# Patient Record
Sex: FEMALE | Race: WHITE | URBAN - METROPOLITAN AREA
[De-identification: names, ages, dates, MRNs, and addresses within clinical notes are randomized per-mention and may not be internally consistent; named-entity substitution may affect disease eponyms.]

---

## 2017-01-17 ENCOUNTER — CARE COORDINATION (OUTPATIENT)
Dept: CARDIOLOGY | Facility: CLINIC | Age: 82
End: 2017-01-17

## 2018-02-13 ENCOUNTER — HOSPITAL ENCOUNTER (INPATIENT)
Facility: CLINIC | Age: 83
LOS: 2 days | Discharge: HOME | DRG: 603 | End: 2018-02-16
Attending: EMERGENCY MEDICINE | Admitting: INTERNAL MEDICINE
Payer: MEDICARE

## 2018-02-13 ENCOUNTER — APPOINTMENT (OUTPATIENT)
Dept: GENERAL RADIOLOGY | Facility: CLINIC | Age: 83
DRG: 603 | End: 2018-02-13
Payer: MEDICARE

## 2018-02-13 DIAGNOSIS — L03.113 CELLULITIS OF HAND, RIGHT: ICD-10-CM

## 2018-02-13 DIAGNOSIS — M10.00 IDIOPATHIC GOUT, UNSPECIFIED CHRONICITY, UNSPECIFIED SITE: Primary | ICD-10-CM

## 2018-02-13 LAB
ALBUMIN SERPL-MCNC: 3.4 G/DL (ref 3.4–5)
ALP SERPL-CCNC: 76 U/L (ref 40–150)
ALT SERPL W P-5'-P-CCNC: 17 U/L (ref 0–50)
ANION GAP SERPL CALCULATED.3IONS-SCNC: 7 MMOL/L (ref 3–14)
AST SERPL W P-5'-P-CCNC: 13 U/L (ref 0–45)
BASOPHILS # BLD AUTO: 0 10E9/L (ref 0–0.2)
BASOPHILS NFR BLD AUTO: 0.1 %
BILIRUB SERPL-MCNC: 1 MG/DL (ref 0.2–1.3)
BUN SERPL-MCNC: 51 MG/DL (ref 7–30)
CALCIUM SERPL-MCNC: 9.3 MG/DL (ref 8.5–10.1)
CHLORIDE SERPL-SCNC: 104 MMOL/L (ref 94–109)
CO2 SERPL-SCNC: 27 MMOL/L (ref 20–32)
CREAT SERPL-MCNC: 1.51 MG/DL (ref 0.52–1.04)
CRP SERPL-MCNC: 65.9 MG/L (ref 0–8)
DIFFERENTIAL METHOD BLD: ABNORMAL
EOSINOPHIL # BLD AUTO: 0.1 10E9/L (ref 0–0.7)
EOSINOPHIL NFR BLD AUTO: 1.2 %
ERYTHROCYTE [DISTWIDTH] IN BLOOD BY AUTOMATED COUNT: 15.5 % (ref 10–15)
ERYTHROCYTE [SEDIMENTATION RATE] IN BLOOD BY WESTERGREN METHOD: 45 MM/H (ref 0–30)
GFR SERPL CREATININE-BSD FRML MDRD: 32 ML/MIN/1.7M2
GLUCOSE SERPL-MCNC: 123 MG/DL (ref 70–99)
HCT VFR BLD AUTO: 31.7 % (ref 35–47)
HGB BLD-MCNC: 9.9 G/DL (ref 11.7–15.7)
IMM GRANULOCYTES # BLD: 0 10E9/L (ref 0–0.4)
IMM GRANULOCYTES NFR BLD: 0.2 %
LYMPHOCYTES # BLD AUTO: 1.2 10E9/L (ref 0.8–5.3)
LYMPHOCYTES NFR BLD AUTO: 14 %
MCH RBC QN AUTO: 27.6 PG (ref 26.5–33)
MCHC RBC AUTO-ENTMCNC: 31.2 G/DL (ref 31.5–36.5)
MCV RBC AUTO: 88 FL (ref 78–100)
MONOCYTES # BLD AUTO: 0.4 10E9/L (ref 0–1.3)
MONOCYTES NFR BLD AUTO: 4.3 %
NEUTROPHILS # BLD AUTO: 6.6 10E9/L (ref 1.6–8.3)
NEUTROPHILS NFR BLD AUTO: 80.2 %
NRBC # BLD AUTO: 0 10*3/UL
NRBC BLD AUTO-RTO: 0 /100
PLATELET # BLD AUTO: 165 10E9/L (ref 150–450)
POTASSIUM SERPL-SCNC: 3.5 MMOL/L (ref 3.4–5.3)
PROT SERPL-MCNC: 7.2 G/DL (ref 6.8–8.8)
RBC # BLD AUTO: 3.59 10E12/L (ref 3.8–5.2)
SODIUM SERPL-SCNC: 138 MMOL/L (ref 133–144)
WBC # BLD AUTO: 8.2 10E9/L (ref 4–11)

## 2018-02-13 PROCEDURE — 80053 COMPREHEN METABOLIC PANEL: CPT | Performed by: STUDENT IN AN ORGANIZED HEALTH CARE EDUCATION/TRAINING PROGRAM

## 2018-02-13 PROCEDURE — 86140 C-REACTIVE PROTEIN: CPT | Performed by: STUDENT IN AN ORGANIZED HEALTH CARE EDUCATION/TRAINING PROGRAM

## 2018-02-13 PROCEDURE — 85610 PROTHROMBIN TIME: CPT | Performed by: STUDENT IN AN ORGANIZED HEALTH CARE EDUCATION/TRAINING PROGRAM

## 2018-02-13 PROCEDURE — 73130 X-RAY EXAM OF HAND: CPT | Mod: RT

## 2018-02-13 PROCEDURE — 99285 EMERGENCY DEPT VISIT HI MDM: CPT | Mod: 25

## 2018-02-13 PROCEDURE — 96376 TX/PRO/DX INJ SAME DRUG ADON: CPT

## 2018-02-13 PROCEDURE — 85025 COMPLETE CBC W/AUTO DIFF WBC: CPT | Performed by: STUDENT IN AN ORGANIZED HEALTH CARE EDUCATION/TRAINING PROGRAM

## 2018-02-13 PROCEDURE — 96375 TX/PRO/DX INJ NEW DRUG ADDON: CPT

## 2018-02-13 PROCEDURE — 96374 THER/PROPH/DIAG INJ IV PUSH: CPT

## 2018-02-13 PROCEDURE — 85652 RBC SED RATE AUTOMATED: CPT | Performed by: STUDENT IN AN ORGANIZED HEALTH CARE EDUCATION/TRAINING PROGRAM

## 2018-02-13 PROCEDURE — 25000128 H RX IP 250 OP 636: Performed by: STUDENT IN AN ORGANIZED HEALTH CARE EDUCATION/TRAINING PROGRAM

## 2018-02-13 RX ORDER — HYDROMORPHONE HYDROCHLORIDE 1 MG/ML
0.5 INJECTION, SOLUTION INTRAMUSCULAR; INTRAVENOUS; SUBCUTANEOUS
Status: COMPLETED | OUTPATIENT
Start: 2018-02-13 | End: 2018-02-13

## 2018-02-13 RX ADMIN — Medication 0.5 MG: at 23:50

## 2018-02-13 NOTE — IP AVS SNAPSHOT
[facility] [address] [facility]    [address]    [place] [address]    Phone:  [phone]                                       After Visit Summary   2/13/2018    Jayne Nino    MRN: [id number]           After Visit Summary Signature Page     I have received my discharge instructions, and my questions have been answered. I have discussed any challenges I see with this plan with the nurse or doctor.    ..........................................................................................................................................  Patient/Patient Representative Signature      ..........................................................................................................................................  Patient Representative Print Name and Relationship to Patient    ..................................................               ................................................  Date                                            Time    ..........................................................................................................................................  Reviewed by Signature/Title    ...................................................              ..............................................  Date                                                            Time

## 2018-02-13 NOTE — IP AVS SNAPSHOT
MRN:[id number]                      After Visit Summary   2/13/2018    Jayne Nino    MRN: [id number]           Thank you!     Thank you for choosing [facility] for your care. Our goal is always to provide you with excellent care. Hearing back from our patients is one way we can continue to improve our services. Please take a few minutes to complete the written survey that you may receive in the mail after you visit with us. Thank you!        Patient Information     Date Of Birth          6/23/1927        Designated Caregiver       Most Recent Value    Caregiver    Will someone help with your care after discharge? yes    Name of designated caregiver Talib [Son]    Phone number of caregiver [phone]    Caregiver address same as patient      About your hospital stay     You were admitted on:  February 14, 2018 You last received care in the:  [facility]    You were discharged on:  February 16, 2018        Reason for your hospital stay       You were admitted with pain and swelling of your right hand . You were given IV abx and prednisone            Reason for your hospital stay       You were admitted with swelling and pain right hand . You were given antibiotics and steroids                  Who to Call     For medical emergencies, please call 911.  For non-urgent questions about your medical care, please call your primary care provider or clinic, [phone]          Attending Provider     Provider Specialty    Carrington Kwok MD Emergency Medicine    [facility], [facility]       Primary Care Provider Office Phone # Fax #    Dinora Jimenez [phone]      After Care Instructions     Activity       As tolerated            Activity       As tolerated            Diet       Regular diet            Diet       regular                  Follow-up Appointments     Adult [facility] Follow-up and recommended labs and tests       Follow up with PCP in one week  "for post hospital discharge follow up     Appointments on [place] and/or [facility] (with [facility] or [facility] provider or service). Call [phone] if you haven't heard regarding these appointments within 7 days of discharge.            Adult [facility] Follow-up and recommended labs and tests       Follow up with PCP in one week     Appointments on [facility]/or [facility] (with [facility] or [facility] provider or service). Call [phone] if you haven't heard regarding these appointments within 7 days of discharge.                  Pending Results     No orders found from 2018 to 2018.            Statement of Approval     Ordered          18 0856  I have reviewed and agree with all the recommendations and orders detailed in this document.  EFFECTIVE NOW     Approved and electronically signed by:  Ana Cabrera MD             Admission Information     Date & Time Provider Department Dept. Phone    2018 Kishan Finn MD [facility] [phone]      Your Vitals Were     Blood Pressure Pulse Temperature Respirations Height Weight    122/61 (BP Location: Right arm) 70 95.7  F (35.4  C) (Oral) 16 1.651 m (5' 5\") 66.3 kg (146 lb 3.2 oz)    Pulse Oximetry BMI (Body Mass Index)                97% 24.33 kg/m2          MyChart Information     [facility] lets you send messages to your doctor, view your test results, renew your prescriptions, schedule appointments and more. To sign up, go to [email or web address] . Click on \"Log in\" on the left side of the screen, which will take you to the Welcome page. Then click on \"Sign up Now\" on the right side of the page.     You will be asked to enter the access code listed below, as well as some personal information. Please follow the directions to create your username and password.     Your access code is: [email or web address]  Expires: 5/15/2018  9:28 AM     Your access code will  in 90 days. If you need help or a new code, please call your [place] " clinic or [phone].        Care EveryWhere ID     This is your Care EveryWhere ID. This could be used by other organizations to access your [facility] medical records  [id number]        Equal Access to Services     ARPAN CROWLEY : Hadii aad ku hadriki Ko, waaxda luqadaha, qaybta kaalmada adeedgar, everett caballerocira wardmartell rios chava abreu. So [facility] [phone].    ATENCIÓN: Si habla español, tiene a canada disposición servicios gratuitos de asistencia lingüística. Llame al [phone].    We comply with applicable federal civil rights laws and [place] laws. We do not discriminate on the basis of race, color, national origin, age, disability, sex, sexual orientation, or gender identity.               Review of your medicines      START taking        Dose / Directions    cephALEXin 500 MG capsule   Commonly known as:  KEFLEX   Used for:  Cellulitis of hand, right        Dose:  500 mg   Take 1 capsule (500 mg) by mouth 2 times daily   Quantity:  20 capsule   Refills:  0       methylPREDNISolone 4 MG tablet   Commonly known as:  MEDROL DOSEPAK   Used for:  Idiopathic gout, unspecified chronicity, unspecified site        Follow package instructions   Quantity:  21 tablet   Refills:  0         CONTINUE these medicines which have NOT CHANGED        Dose / Directions    acetaminophen 325 MG tablet   Commonly known as:  TYLENOL        Dose:  325-650 mg   Take 325-650 mg by mouth every 4 hours as needed   Quantity:  250 tablet   Refills:  0       atorvastatin 80 MG tablet   Commonly known as:  LIPITOR        Dose:  80 mg   Take 80 mg by mouth At Bedtime   Refills:  0       cefdinir 300 MG capsule   Commonly known as:  OMNICEF   Indication:  Urinary Tract Infection   Used for:  Dysuria        Dose:  300 mg   Take 1 capsule (300 mg) by mouth 2 times daily   Quantity:  14 capsule   Refills:  0       COREG 25 MG tablet   Generic drug:  carvedilol        Dose:  25 mg   Take 25 mg by mouth 2 times daily   Refills:  0        famotidine 20 MG tablet   Commonly known as:  PEPCID        Dose:  20 mg   Take 20 mg by mouth 2 times daily.   Refills:  0       ferrous sulfate 140 (45 FE) MG Tbcr CR tablet        Dose:  140 mg   Take 140 mg by mouth daily   Refills:  0       furosemide 40 MG tablet   Commonly known as:  LASIX   Used for:  (HFpEF) heart failure with preserved ejection fraction (H)        Dose:  40 mg   Take 1 tablet (40 mg) by mouth every morning   Quantity:  30 tablet   Refills:  0       levothyroxine 125 MCG tablet   Commonly known as:  SYNTHROID/LEVOTHROID   Used for:  Bradycardia        Dose:  125 mcg   Take 1 tablet by mouth daily.   Quantity:  90 tablet   Refills:  1       polyethylene glycol 0.4%- propylene glycol 0.3% 0.4-0.3 % Soln ophthalmic solution   Commonly known as:  SYSTANE ULTRA        Dose:  1 drop   Place 1 drop into both eyes every hour as needed for dry eyes   Refills:  0       potassium chloride SA 10 MEQ CR tablet   Commonly known as:  KLOR-CON   Used for:  Hypokalemia        Dose:  10 mEq   Take 1 tablet (10 mEq) by mouth daily   Quantity:  30 tablet   Refills:  0       spironolactone 50 MG tablet   Commonly known as:  ALDACTONE   Used for:  (HFpEF) heart failure with preserved ejection fraction (H)        Dose:  50 mg   Take 1 tablet (50 mg) by mouth daily   Quantity:  30 tablet   Refills:  0       warfarin 5 MG tablet   Commonly known as:  COUMADIN   Indication:  atrial fibrillation        Dose:  5-7.5 mg   Take 5-7.5 mg by mouth daily Take 7.5 mg on Friday and 5 mg all other days of the week   Refills:  0         STOP taking     metolazone 2.5 MG tablet   Commonly known as:  ZAROXOLYN           oxyCODONE-acetaminophen 5-325 MG per tablet   Commonly known as:  PERCOCET                Where to get your medicines      These medications were sent to [facility] [place], [place] - [address]  [address] [address]     Phone:  [phone]     cephALEXin 500 MG  capsule    methylPREDNISolone 4 MG tablet                Protect others around you: Learn how to safely use, store and throw away your medicines at www.disposemymeds.org.        ANTIBIOTIC INSTRUCTION     You've Been Prescribed an Antibiotic - Now What?  Your healthcare team thinks that you or your loved one might have an infection. Some infections can be treated with antibiotics, which are powerful, life-saving drugs. Like all medications, antibiotics have side effects and should only be used when necessary. There are some important things you should know about your antibiotic treatment.      Your healthcare team may run tests before you start taking an antibiotic.    Your team may take samples (e.g., from your blood, urine or other areas) to run tests to look for bacteria. These test can be important to determine if you need an antibiotic at all and, if you do, which antibiotic will work best.      Within a few days, your healthcare team might change or even stop your antibiotic.    Your team may start you on an antibiotic while they are working to find out what is making you sick.    Your team might change your antibiotic because test results show that a different antibiotic would be better to treat your infection.    In some cases, once your team has more information, they learn that you do not need an antibiotic at all. They may find out that you don't have an infection, or that the antibiotic you're taking won't work against your infection. For example, an infection caused by a virus can't be treated with antibiotics. Staying on an antibiotic when you don't need it is more likely to be harmful than helpful.      You may experience side effects from your antibiotic.    Like all medications, antibiotics have side effects. Some of these can be serious.    Let you healthcare team know if you have any known allergies when you are admitted to the hospital.    One significant side effect of nearly all antibiotics is  the risk of severe and sometimes deadly diarrhea caused by Clostridium difficile (C. Difficile). This occurs when a person takes antibiotics because some good germs are destroyed. Antibiotic use allows C. diificile to take over, putting patients at high risk for this serious infection.    As a patient or caregiver, it is important to understand your or your loved one's antibiotic treatment. It is especially important for caregivers to speak up when patients can't speak for themselves. Here are some important questions to ask your healthcare team.    What infection is this antibiotic treating and how do you know I have that infection?    What side effects might occur from this antibiotic?    How long will I need to take this antibiotic?    Is it safe to take this antibiotic with other medications or supplements (e.g., vitamins) that I am taking?     Are there any special directions I need to know about taking this antibiotic? For example, should I take it with food?    How will I be monitored to know whether my infection is responding to the antibiotic?    What tests may help to make sure the right antibiotic is prescribed for me?      Information provided by:  www.cdc.gov/getsmart  U.S. Department of Health and Human Services  Centers for disease Control and Prevention  National Center for Emerging and Zoonotic Infectious Diseases  Division of Healthcare Quality Promotion             Medication List: This is a list of all your medications and when to take them. Check marks below indicate your daily home schedule. Keep this list as a reference.      Medications           Morning Afternoon Evening Bedtime As Needed    acetaminophen 325 MG tablet   Commonly known as:  TYLENOL   Take 325-650 mg by mouth every 4 hours as needed   Last time this was given:  650 mg on 2/15/2018 10:25 PM                                atorvastatin 80 MG tablet   Commonly known as:  LIPITOR   Take 80 mg by mouth At Bedtime   Last time this  was given:  80 mg on 2/15/2018 10:25 PM                                cefdinir 300 MG capsule   Commonly known as:  OMNICEF   Take 1 capsule (300 mg) by mouth 2 times daily                                cephALEXin 500 MG capsule   Commonly known as:  KEFLEX   Take 1 capsule (500 mg) by mouth 2 times daily   Last time this was given:  500 mg on 2/16/2018  9:07 AM                                COREG 25 MG tablet   Take 25 mg by mouth 2 times daily   Last time this was given:  25 mg on 2/16/2018  9:00 AM   Generic drug:  carvedilol                                famotidine 20 MG tablet   Commonly known as:  PEPCID   Take 20 mg by mouth 2 times daily.                                ferrous sulfate 140 (45 FE) MG Tbcr CR tablet   Take 140 mg by mouth daily                                furosemide 40 MG tablet   Commonly known as:  LASIX   Take 1 tablet (40 mg) by mouth every morning   Last time this was given:  40 mg on 2/16/2018  9:07 AM                                levothyroxine 125 MCG tablet   Commonly known as:  SYNTHROID/LEVOTHROID   Take 1 tablet by mouth daily.   Last time this was given:  125 mcg on 2/16/2018 10:06 AM                                methylPREDNISolone 4 MG tablet   Commonly known as:  MEDROL DOSEPAK   Follow package instructions                                polyethylene glycol 0.4%- propylene glycol 0.3% 0.4-0.3 % Soln ophthalmic solution   Commonly known as:  SYSTANE ULTRA   Place 1 drop into both eyes every hour as needed for dry eyes                                potassium chloride SA 10 MEQ CR tablet   Commonly known as:  KLOR-CON   Take 1 tablet (10 mEq) by mouth daily   Last time this was given:  10 mEq on 2/16/2018  9:07 AM                                spironolactone 50 MG tablet   Commonly known as:  ALDACTONE   Take 1 tablet (50 mg) by mouth daily   Last time this was given:  50 mg on 2/16/2018  9:00 AM                                warfarin 5 MG tablet   Commonly known as:   COUMADIN   Take 5-7.5 mg by mouth daily Take 7.5 mg on Friday and 5 mg all other days of the week   Last time this was given:  5 mg on 2/15/2018  6:01 PM

## 2018-02-14 ENCOUNTER — APPOINTMENT (OUTPATIENT)
Dept: ULTRASOUND IMAGING | Facility: CLINIC | Age: 83
DRG: 603 | End: 2018-02-14
Payer: MEDICARE

## 2018-02-14 PROBLEM — L03.119 CELLULITIS OF HAND: Status: ACTIVE | Noted: 2018-02-14

## 2018-02-14 LAB
ANION GAP SERPL CALCULATED.3IONS-SCNC: 7 MMOL/L (ref 3–14)
BUN SERPL-MCNC: 47 MG/DL (ref 7–30)
CALCIUM SERPL-MCNC: 9 MG/DL (ref 8.5–10.1)
CHLORIDE SERPL-SCNC: 104 MMOL/L (ref 94–109)
CO2 SERPL-SCNC: 28 MMOL/L (ref 20–32)
CREAT SERPL-MCNC: 1.37 MG/DL (ref 0.52–1.04)
CRP SERPL-MCNC: 72.4 MG/L (ref 0–8)
ERYTHROCYTE [DISTWIDTH] IN BLOOD BY AUTOMATED COUNT: 15.6 % (ref 10–15)
GFR SERPL CREATININE-BSD FRML MDRD: 36 ML/MIN/1.7M2
GLUCOSE SERPL-MCNC: 103 MG/DL (ref 70–99)
HCT VFR BLD AUTO: 30.6 % (ref 35–47)
HGB BLD-MCNC: 9.6 G/DL (ref 11.7–15.7)
INR PPP: 2.16 (ref 0.86–1.14)
INR PPP: 2.2 (ref 0.86–1.14)
MCH RBC QN AUTO: 27.7 PG (ref 26.5–33)
MCHC RBC AUTO-ENTMCNC: 31.4 G/DL (ref 31.5–36.5)
MCV RBC AUTO: 88 FL (ref 78–100)
PLATELET # BLD AUTO: 163 10E9/L (ref 150–450)
POTASSIUM SERPL-SCNC: 3.4 MMOL/L (ref 3.4–5.3)
RBC # BLD AUTO: 3.47 10E12/L (ref 3.8–5.2)
SODIUM SERPL-SCNC: 139 MMOL/L (ref 133–144)
URATE SERPL-MCNC: 10 MG/DL (ref 2.6–6)
WBC # BLD AUTO: 7.4 10E9/L (ref 4–11)

## 2018-02-14 PROCEDURE — 99233 SBSQ HOSP IP/OBS HIGH 50: CPT | Performed by: INTERNAL MEDICINE

## 2018-02-14 PROCEDURE — 25000128 H RX IP 250 OP 636: Performed by: INTERNAL MEDICINE

## 2018-02-14 PROCEDURE — 86140 C-REACTIVE PROTEIN: CPT | Performed by: INTERNAL MEDICINE

## 2018-02-14 PROCEDURE — 25000132 ZZH RX MED GY IP 250 OP 250 PS 637: Mod: GY | Performed by: INTERNAL MEDICINE

## 2018-02-14 PROCEDURE — 25000128 H RX IP 250 OP 636: Performed by: EMERGENCY MEDICINE

## 2018-02-14 PROCEDURE — 25000132 ZZH RX MED GY IP 250 OP 250 PS 637: Mod: GY | Performed by: ORTHOPAEDIC SURGERY

## 2018-02-14 PROCEDURE — 99221 1ST HOSP IP/OBS SF/LOW 40: CPT | Mod: AI | Performed by: INTERNAL MEDICINE

## 2018-02-14 PROCEDURE — A9270 NON-COVERED ITEM OR SERVICE: HCPCS | Mod: GY | Performed by: ORTHOPAEDIC SURGERY

## 2018-02-14 PROCEDURE — 84550 ASSAY OF BLOOD/URIC ACID: CPT | Performed by: INTERNAL MEDICINE

## 2018-02-14 PROCEDURE — 36415 COLL VENOUS BLD VENIPUNCTURE: CPT | Performed by: INTERNAL MEDICINE

## 2018-02-14 PROCEDURE — A9270 NON-COVERED ITEM OR SERVICE: HCPCS | Mod: GY | Performed by: INTERNAL MEDICINE

## 2018-02-14 PROCEDURE — 25000128 H RX IP 250 OP 636: Performed by: STUDENT IN AN ORGANIZED HEALTH CARE EDUCATION/TRAINING PROGRAM

## 2018-02-14 PROCEDURE — 80048 BASIC METABOLIC PNL TOTAL CA: CPT | Performed by: INTERNAL MEDICINE

## 2018-02-14 PROCEDURE — 99207 ZZC CDG-HISTORY COMP: MEETS EXP. PROBLEM FOCUSED-DOWN CODED LACK OF ROS: CPT | Performed by: INTERNAL MEDICINE

## 2018-02-14 PROCEDURE — 12000001 ZZH R&B MED SURG/OB UMMC

## 2018-02-14 PROCEDURE — 27210995 ZZH RX 272: Performed by: STUDENT IN AN ORGANIZED HEALTH CARE EDUCATION/TRAINING PROGRAM

## 2018-02-14 PROCEDURE — 76882 US LMTD JT/FCL EVL NVASC XTR: CPT | Mod: RT

## 2018-02-14 PROCEDURE — 85610 PROTHROMBIN TIME: CPT | Performed by: INTERNAL MEDICINE

## 2018-02-14 PROCEDURE — 25000125 ZZHC RX 250: Performed by: INTERNAL MEDICINE

## 2018-02-14 PROCEDURE — 85027 COMPLETE CBC AUTOMATED: CPT | Performed by: INTERNAL MEDICINE

## 2018-02-14 RX ORDER — WARFARIN SODIUM 2.5 MG/1
5 TABLET ORAL
Status: COMPLETED | OUTPATIENT
Start: 2018-02-14 | End: 2018-02-14

## 2018-02-14 RX ORDER — ACETAMINOPHEN 325 MG/1
650 TABLET ORAL EVERY 4 HOURS PRN
Status: DISCONTINUED | OUTPATIENT
Start: 2018-02-14 | End: 2018-02-16 | Stop reason: HOSPADM

## 2018-02-14 RX ORDER — OXYCODONE AND ACETAMINOPHEN 5; 325 MG/1; MG/1
1 TABLET ORAL EVERY 4 HOURS PRN
Status: DISCONTINUED | OUTPATIENT
Start: 2018-02-14 | End: 2018-02-14

## 2018-02-14 RX ORDER — SPIRONOLACTONE 25 MG/1
50 TABLET ORAL DAILY
Status: DISCONTINUED | OUTPATIENT
Start: 2018-02-14 | End: 2018-02-16 | Stop reason: HOSPADM

## 2018-02-14 RX ORDER — HYDROMORPHONE HYDROCHLORIDE 2 MG/1
2 TABLET ORAL
Status: DISCONTINUED | OUTPATIENT
Start: 2018-02-14 | End: 2018-02-14

## 2018-02-14 RX ORDER — FAMOTIDINE 20 MG/1
20 TABLET, FILM COATED ORAL 2 TIMES DAILY
Status: DISCONTINUED | OUTPATIENT
Start: 2018-02-14 | End: 2018-02-14

## 2018-02-14 RX ORDER — AMOXICILLIN 250 MG
1 CAPSULE ORAL 2 TIMES DAILY PRN
Status: DISCONTINUED | OUTPATIENT
Start: 2018-02-14 | End: 2018-02-16 | Stop reason: HOSPADM

## 2018-02-14 RX ORDER — HYDROMORPHONE HCL/0.9% NACL/PF 0.2MG/0.2
.2-.3 SYRINGE (ML) INTRAVENOUS
Status: DISCONTINUED | OUTPATIENT
Start: 2018-02-14 | End: 2018-02-14

## 2018-02-14 RX ORDER — VANCOMYCIN HYDROCHLORIDE 1 G/200ML
1000 INJECTION, SOLUTION INTRAVENOUS EVERY 24 HOURS
Status: DISCONTINUED | OUTPATIENT
Start: 2018-02-15 | End: 2018-02-14

## 2018-02-14 RX ORDER — POTASSIUM CHLORIDE 750 MG/1
10 TABLET, EXTENDED RELEASE ORAL DAILY
Status: DISCONTINUED | OUTPATIENT
Start: 2018-02-14 | End: 2018-02-15

## 2018-02-14 RX ORDER — AMOXICILLIN 250 MG
2 CAPSULE ORAL 2 TIMES DAILY PRN
Status: DISCONTINUED | OUTPATIENT
Start: 2018-02-14 | End: 2018-02-16 | Stop reason: HOSPADM

## 2018-02-14 RX ORDER — ATORVASTATIN CALCIUM 40 MG/1
80 TABLET, FILM COATED ORAL AT BEDTIME
Status: DISCONTINUED | OUTPATIENT
Start: 2018-02-14 | End: 2018-02-16 | Stop reason: HOSPADM

## 2018-02-14 RX ORDER — CALCIUM CARBONATE 500 MG/1
1000 TABLET, CHEWABLE ORAL 4 TIMES DAILY PRN
Status: DISCONTINUED | OUTPATIENT
Start: 2018-02-14 | End: 2018-02-16 | Stop reason: HOSPADM

## 2018-02-14 RX ORDER — NALOXONE HYDROCHLORIDE 0.4 MG/ML
.1-.4 INJECTION, SOLUTION INTRAMUSCULAR; INTRAVENOUS; SUBCUTANEOUS
Status: DISCONTINUED | OUTPATIENT
Start: 2018-02-14 | End: 2018-02-16 | Stop reason: HOSPADM

## 2018-02-14 RX ORDER — CARVEDILOL 25 MG/1
25 TABLET ORAL 2 TIMES DAILY
Status: DISCONTINUED | OUTPATIENT
Start: 2018-02-14 | End: 2018-02-16 | Stop reason: HOSPADM

## 2018-02-14 RX ORDER — VANCOMYCIN HYDROCHLORIDE 1 G/200ML
1000 INJECTION, SOLUTION INTRAVENOUS EVERY 24 HOURS
Status: DISCONTINUED | OUTPATIENT
Start: 2018-02-15 | End: 2018-02-15

## 2018-02-14 RX ORDER — NALOXONE HYDROCHLORIDE 0.4 MG/ML
.1-.4 INJECTION, SOLUTION INTRAMUSCULAR; INTRAVENOUS; SUBCUTANEOUS
Status: DISCONTINUED | OUTPATIENT
Start: 2018-02-14 | End: 2018-02-14

## 2018-02-14 RX ORDER — FUROSEMIDE 40 MG
40 TABLET ORAL EVERY MORNING
Status: DISCONTINUED | OUTPATIENT
Start: 2018-02-14 | End: 2018-02-15

## 2018-02-14 RX ORDER — ONDANSETRON 2 MG/ML
4 INJECTION INTRAMUSCULAR; INTRAVENOUS EVERY 6 HOURS PRN
Status: DISCONTINUED | OUTPATIENT
Start: 2018-02-14 | End: 2018-02-16 | Stop reason: HOSPADM

## 2018-02-14 RX ORDER — ONDANSETRON 4 MG/1
4 TABLET, ORALLY DISINTEGRATING ORAL EVERY 6 HOURS PRN
Status: DISCONTINUED | OUTPATIENT
Start: 2018-02-14 | End: 2018-02-16 | Stop reason: HOSPADM

## 2018-02-14 RX ORDER — VANCOMYCIN HYDROCHLORIDE 1 G/200ML
1000 INJECTION, SOLUTION INTRAVENOUS ONCE
Status: COMPLETED | OUTPATIENT
Start: 2018-02-14 | End: 2018-02-14

## 2018-02-14 RX ORDER — OXYCODONE HYDROCHLORIDE 5 MG/1
5 TABLET ORAL EVERY 4 HOURS PRN
Status: DISCONTINUED | OUTPATIENT
Start: 2018-02-14 | End: 2018-02-15

## 2018-02-14 RX ORDER — PREDNISONE 20 MG/1
40 TABLET ORAL ONCE
Status: COMPLETED | OUTPATIENT
Start: 2018-02-14 | End: 2018-02-14

## 2018-02-14 RX ADMIN — CALCIUM CARBONATE (ANTACID) CHEW TAB 500 MG 500 MG: 500 CHEW TAB at 20:20

## 2018-02-14 RX ADMIN — CEFTRIAXONE SODIUM 1 G: 10 INJECTION, POWDER, FOR SOLUTION INTRAVENOUS at 01:37

## 2018-02-14 RX ADMIN — SODIUM CHLORIDE, POTASSIUM CHLORIDE, SODIUM LACTATE AND CALCIUM CHLORIDE 250 ML: 600; 310; 30; 20 INJECTION, SOLUTION INTRAVENOUS at 19:09

## 2018-02-14 RX ADMIN — HYDROMORPHONE HYDROCHLORIDE 2 MG: 2 TABLET ORAL at 01:35

## 2018-02-14 RX ADMIN — ATORVASTATIN CALCIUM 80 MG: 40 TABLET, FILM COATED ORAL at 21:54

## 2018-02-14 RX ADMIN — FUROSEMIDE 40 MG: 40 TABLET ORAL at 08:46

## 2018-02-14 RX ADMIN — SPIRONOLACTONE 50 MG: 25 TABLET ORAL at 08:46

## 2018-02-14 RX ADMIN — WARFARIN SODIUM 5 MG: 2.5 TABLET ORAL at 18:20

## 2018-02-14 RX ADMIN — ATORVASTATIN CALCIUM 80 MG: 40 TABLET, FILM COATED ORAL at 03:29

## 2018-02-14 RX ADMIN — POTASSIUM CHLORIDE 10 MEQ: 750 TABLET, FILM COATED, EXTENDED RELEASE ORAL at 08:46

## 2018-02-14 RX ADMIN — LEVOTHYROXINE SODIUM 125 MCG: 25 TABLET ORAL at 08:46

## 2018-02-14 RX ADMIN — PREDNISONE 40 MG: 20 TABLET ORAL at 08:46

## 2018-02-14 RX ADMIN — RANITIDINE 150 MG: 150 TABLET ORAL at 08:46

## 2018-02-14 RX ADMIN — CARVEDILOL 25 MG: 25 TABLET, FILM COATED ORAL at 08:46

## 2018-02-14 RX ADMIN — VANCOMYCIN HYDROCHLORIDE 1000 MG: 1 INJECTION, SOLUTION INTRAVENOUS at 01:43

## 2018-02-14 ASSESSMENT — ENCOUNTER SYMPTOMS
SORE THROAT: 0
PALPITATIONS: 0
FEVER: 0
AGITATION: 0
EYE PAIN: 0
ARTHRALGIAS: 1
DIARRHEA: 0
CHILLS: 0
JOINT SWELLING: 1
NECK PAIN: 0
CHEST TIGHTNESS: 0
HEADACHES: 0
WOUND: 0
VOMITING: 0
NAUSEA: 0
COLOR CHANGE: 1
ABDOMINAL PAIN: 0
SHORTNESS OF BREATH: 0
ADENOPATHY: 0
CONSTIPATION: 0
RHINORRHEA: 0
DYSURIA: 0
FLANK PAIN: 0
NERVOUS/ANXIOUS: 0
DIZZINESS: 0
CONFUSION: 0
POLYDIPSIA: 0
VOICE CHANGE: 0
WHEEZING: 0
LIGHT-HEADEDNESS: 0
WEAKNESS: 0

## 2018-02-14 ASSESSMENT — ACTIVITIES OF DAILY LIVING (ADL)
RETIRED_COMMUNICATION: 0-->UNDERSTANDS/COMMUNICATES WITHOUT DIFFICULTY
AMBULATION: 1-->ASSISTIVE EQUIPMENT
COGNITION: 0 - NO COGNITION ISSUES REPORTED
SWALLOWING: 0-->SWALLOWS FOODS/LIQUIDS WITHOUT DIFFICULTY
ADLS_ACUITY_SCORE: 18
FALL_HISTORY_WITHIN_LAST_SIX_MONTHS: NO
RETIRED_EATING: 0-->INDEPENDENT
TOILETING: 1-->ASSISTIVE EQUIPMENT
TRANSFERRING: 0-->INDEPENDENT
DRESS: 0-->INDEPENDENT
ADLS_ACUITY_SCORE: 18
ADLS_ACUITY_SCORE: 19
BATHING: 1-->ASSISTIVE EQUIPMENT
ADLS_ACUITY_SCORE: 18
ADLS_ACUITY_SCORE: 18

## 2018-02-14 NOTE — PROGRESS NOTES
Seen again this PM.    Decreased erythema across dorsal hand. Mildly improved pain.  Tenderness in affected area remains.  ROM similar to exam this AM.    Still with no systemic symptoms.          A:  Improvement on prednisone suggests pseudogout could indeed be the diagnosis.  Alternative thought would be suppression of the immune response to infection causing improvement in erythema; this remains possible as patient remains with pain in similar areas at present, though we're still early in prednisone treatment timeline.    P:  Will continue to follow clinically.    If develops systemic symptoms of infection, would recommend immediately restarting antibiotics.    Hiren France MD  Orthopaedic Surgery PGY-4  Pager:  [phone]

## 2018-02-14 NOTE — H&P
History and Physical     Jayne Nino MRN# [id number]   YOB: 1927 Age: 89 year old      Date of Admission:  2/13/2018      CC: R hand pain, swelling.   ~4 days.     HPI: Obtained from the patient, chart review, ED provider.    Ms Nino,  is a Right hand dominat 89 year old female with a significant past medical history of aortic valve replacement, afib on coumadin, htn, HfpEF, ? Acute gout  who presents with 4 days dorsal R hand painful swelling, redness and chills.      Insidious onset, no antecedent skin injuries. Started in index + long PIPs. Since then the erythema, pain, swelling has spread across those two digits and over dorsal hand. Pain worsened as swelling spread.      Symptom Profile  Quality of symptoms:   Achy/sharp  Severity:   Moderate to severe  Exacerbating:    Movement, palpation of erythematous areas  Alleviate:   rest    No fever. No CP or sob or cough. No bowel or bladder concern. No rash elsewhere.   At baseline ambulates using walker. LUNDBERG+. Not on oxygen at home.   No other concern.     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   Dry eyes  PVD  ARMD  HL  hypothyroidism  Obesity  OA  arthritis    Past Surgical History:  Past Surgical History:   Procedure Laterality Date     ORTHOPEDIC SURGERY- hip replacement.        REPLACE VALVE AORTIC         Allergies:     Allergies   Allergen Reactions     Oxycodone      Pt states she takes it for back pain, only causes itching       Medications:    No current facility-administered medications on file prior to encounter.   Current Outpatient Prescriptions on File Prior to Encounter:  furosemide (LASIX) 40 MG tablet Take 1 tablet (40 mg) by mouth every morning   spironolactone (ALDACTONE) 50 MG tablet Take 1 tablet (50 mg) by mouth daily   potassium chloride SA (POTASSIUM CHLORIDE) 10 MEQ tablet Take 1 tablet (10  mEq) by mouth daily   cefdinir (OMNICEF) 300 MG capsule Take 1 capsule (300 mg) by mouth 2 times daily   oxyCODONE-acetaminophen (PERCOCET) 5-325 MG per tablet Take 1 tablet by mouth every 6 hours as needed   warfarin (COUMADIN) 5 MG tablet Take 5-7.5 mg by mouth daily Take 7.5 mg on Friday and 5 mg all other days of the week   carvedilol (COREG) 25 MG tablet Take 25 mg by mouth 2 times daily   ferrous sulfate 140 (45 FE) MG TBCR Take 140 mg by mouth daily   metolazone (ZAROXOLYN) 2.5 MG tablet Take 2.5 mg by mouth three times a week On Mondays, Wednesdays and Fridays   atorvastatin (LIPITOR) 80 MG tablet Take 80 mg by mouth At Bedtime   polyethylene glycol 0.4%- propylene glycol 0.3% (SYSTANE ULTRA) 0.4-0.3 % SOLN ophthalmic solution Place 1 drop into both eyes every hour as needed for dry eyes   famotidine (PEPCID) 20 MG tablet Take 20 mg by mouth 2 times daily.   acetaminophen (TYLENOL) 325 MG tablet Take 325-650 mg by mouth every 4 hours as needed    levothyroxine (SYNTHROID, LEVOTHROID) 125 MCG tablet Take 1 tablet by mouth daily.       Social History:  Social History     Social History     Marital status:      Spouse name: N/A     Number of children: N/A     Years of education: N/A     Occupational History     Not on file.     Social History Main Topics     Smoking status: Never Smoker     Smokeless tobacco: Never Used     Alcohol use No     Drug use: No     Sexual activity: Not on file     Other Topics Concern     Not on file     Social History Narrative       Family History:   Family History   Problem Relation Age of Onset     Coronary Artery Disease Mother      Coronary Artery Disease Father      CEREBROVASCULAR DISEASE Mother      Breast Cancer No family hx of      Colon Cancer No family hx of      Other Cancer No family hx of          ROS:  The remainder of the complete ROS was negative unless noted in the HPI.      Exam:    /66  Pulse 95  Temp 96.3  F (35.7  C) (Oral)  Resp 16  Ht 1.651  "m (5' 5\")  Wt 66.3 kg (146 lb 3.2 oz)  SpO2 95%  Breastfeeding? No  BMI 24.33 kg/m2    Temp (24hrs), Av.4  F (36.3  C), Min:96.3  F (35.7  C), Max:98.9  F (37.2  C)      Wt Readings from Last 5 Encounters:   18 66.3 kg (146 lb 3.2 oz)   16 78.3 kg (172 lb 9.6 oz)   10/30/12 90.7 kg (200 lb)   11 87.5 kg (193 lb)   11 87.5 kg (193 lb)       General: Alert, interactive, NAD  HEENT: AT/NC, sclera anicteric, PERRL, moist MM  Neck: Supple, no  lymphadenopathy  Resp/chest: non labored. Fine basal crackles bl. no  wheezes  Cardiac/Heart: s1s2 irregular   GI/Abdomen: Soft, nontender, nondistended. +BS.  No rebound or guarding.  MSK/Extremities:bl 1+ LE edema, distally warm and well perfused.   R hand: diffuse tender swelling and erythema over dorsal hand extending over almost all digits. No skin breakdown or discharge. Rest per Ortho  Skin: Warm and dry, no jaundice   Neuro: Alert & oriented x 3, Cns 2-12 intact,   Psychiatry: stable mood.       Labs:    BMP  Recent Labs  Lab 18      POTASSIUM 3.5   CHLORIDE 104   NAVARRO 9.3   CO2 27   BUN 51*   CR 1.51*   *     CBC  Recent Labs  Lab 18   WBC 8.2   RBC 3.59*   HGB 9.9*   HCT 31.7*   MCV 88   MCH 27.6   MCHC 31.2*   RDW 15.5*        INR  Recent Labs  Lab 18   INR 2.20*     LFTs  Recent Labs  Lab 18   ALKPHOS 76   AST 13   ALT 17   BILITOTAL 1.0   PROTTOTAL 7.2   ALBUMIN 3.4      PANCNo lab results found in last 7 days.    Imaging:   Recent Results (from the past 24 hour(s))   Hand XR, G/E 3 views, right    Narrative    HAND RIGHT THREE OR MORE VIEWS   2018 10:33 PM     HISTORY: Rule out osteomyelitis.    COMPARISON: None.      Impression    IMPRESSION: No convincing destructive bony lesions. However, if  further assessment is desired, MRI could provide more sensitive  assessment. No fractures identified. Scattered degenerative joint  disease changes.    KELLY DONIS MD   US " Extremity Non Vascular Right    Narrative    ULTRASOUND SOFT TISSUES RIGHT INDEX FINGER LIMITED  2/14/2018 12:50 AM      HISTORY: Concern for abscess in the dorsal aspect of the right hand.          IMPRESSION: Edema is present within the soft tissues of the dorsal  aspect of the right hand at the base of the second finger, at the site  of clinical concern. No underlying abscess is visualized.    MICHI CRANDALL MD       ESR  45  CRP  66        Assessment/ Plan:    89 yo pleasant female with multiple medical co morbidities admitted with R hand progressive erythema, painful swelling x 4 days.     # R hand erythema, pain, swelling:   ? Cellulitis vs inflammatory arthritis vs septic arthritis.   Esr, crp elevated.   Chills+ otherwise no other systemic s/s  Seen by Ortho in ED, ?long finger PIP septic arthritis.    XR, US as above. No drainable abscess.     Ortho: rec iv abx, ortho to follow and assess for ? PIP  Started empirically on iv Vancomycin and Ceftriaxone in eD, continue.   Keep r hand elevated.   Monitor vitals. Trend crp    Pain control: tylenol. Narcotics po/iv prn- use judiciously  Check Uric acid    FU with ORtho  Consider ID consult based on clinical response.     # Chronic medical problems:     CVS:   AvR  HTN, HL  Afib on Coumadin   HFpEF    - appears stable.   - Resume PTA medications: including Coreg, Coumadin, furosemide, spironolactone, atorvastatin.     # Hypothyroidism:   - Levothyroxine.     # dry eyes: eye drops.       FEN: regular diet.   Prophylaxis: on couamdin. Famotidine.   IV Access; PIV  CODE: full    Disposition: Disposition Plan   Expected discharge in 2-3 days to prior living arrangement once medically ready.     Entered: Kishan Finn 02/14/2018, 4:17 AM       * verify PTA medications in am with patient/family.    D/w RN     Kishan Finn MD   Hospitalist (Internal Medicine)  [facility]  Pager: [phone].

## 2018-02-14 NOTE — PROGRESS NOTES
Pt. transferred from ER at 0233. VS stable on room air; orientated patient to room and call light system. Patient belongings placed into bedside closet. Continue to monitor.

## 2018-02-14 NOTE — PLAN OF CARE
Problem: Patient Care Overview  Goal: Plan of Care/Patient Progress Review  Pt A/O X 4. VSS, put patient on 2L NC to maintain SpO2. Patient reports disrupted sleep, wasn't able to go to sleep until after 0330 due to transfer and admission to unit. Lung sounds are clear/equal bilaterally. IS encouraged.  PP2+ DP2+. CMS and Neuro's are intact. Denies numbness and tingling in all extremities. Denies nausea, shortness of breath, and chest pain. Has pain in the right hand and denied a need for pain medication, RUE elevated on pill, and is tolerating well. Right hand cellulitis, all other areas of skin are intact. Voids spontaneously without difficulty in the bathroom. Is on a regular diet, no meal this shift. Bowel sounds are audible/active in all four quadrants, is gas, last BM 2/12. Pt up assist of 1 with walker. PIV is patent in the right forearm and it is saline locked between antibiotics. PCD's on BLE's. Bed alarm on for safety. Pt is able to make needs known and the call light is within the pt's reach. Continue to monitor.

## 2018-02-14 NOTE — ED PROVIDER NOTES
"  History     Chief Complaint   Patient presents with     Hand Pain     Pt has swelling and redness to (R) hand. Sent here from urgent care. No known injury     The history is provided by the patient.     Jayne Nino is a 89 year old female who is sent from urgent care for emergency room evaluation concerning right hand swelling and redness.  Patient states that this began about 5 days ago.  She first noticed swelling in the distal right hand, second and third digits.  Swelling has progressively moved to the proximal dorsum of hand.  States her hand is tender.  Patient does have history of gout, diagnosed December 2017, but does not have serology.  She does admit \"bad\" diet over the past few weeks including snacks, and meats.    Patient does have a history of chronic diastolic heart failure, chronic A. fib, hypertension, aortic valve replacement 2011, hypothyroidism, hyperlipidemia.    I have reviewed the Medications, Allergies, Past Medical and Surgical History, and Social History in the Epic system.    Review of Systems   Constitutional: Negative for chills and fever.   HENT: Negative for congestion, rhinorrhea, sore throat and voice change.    Eyes: Negative for pain and visual disturbance.   Respiratory: Negative for chest tightness, shortness of breath and wheezing.    Cardiovascular: Negative for chest pain, palpitations and leg swelling.   Gastrointestinal: Negative for abdominal pain, constipation, diarrhea, nausea and vomiting.   Endocrine: Negative for polydipsia and polyuria.   Genitourinary: Negative for dysuria, flank pain, pelvic pain and vaginal bleeding.   Musculoskeletal: Positive for arthralgias and joint swelling. Negative for neck pain.   Skin: Positive for color change (R hand). Negative for wound.   Allergic/Immunologic: Negative for immunocompromised state.   Neurological: Negative for dizziness, syncope, weakness, light-headedness and headaches.   Hematological: Negative for " "adenopathy.   Psychiatric/Behavioral: Negative for agitation and confusion. The patient is not nervous/anxious.        Physical Exam   BP: 153/69  Heart Rate: 102  Temp: 97  F (36.1  C)  Resp: 18  Height: 165.1 cm (5' 5\")  Weight: 66.7 kg (147 lb)  SpO2: 98 %      Physical Exam   Constitutional: She is oriented to person, place, and time. She appears well-developed. No distress.   HENT:   Head: Normocephalic and atraumatic.   Mouth/Throat: Oropharynx is clear and moist.   Eyes: Conjunctivae and EOM are normal. Pupils are equal, round, and reactive to light. No scleral icterus.   Neck: Neck supple. No tracheal deviation present.   Cardiovascular: Normal rate, regular rhythm and intact distal pulses.    Pulmonary/Chest: Effort normal. No respiratory distress. She has no wheezes.   Abdominal: Soft. She exhibits no distension. There is no tenderness.   Musculoskeletal: She exhibits edema and tenderness.        Right hand: She exhibits decreased range of motion, tenderness, deformity (gross swelling ) and swelling. She exhibits normal capillary refill.        Hands:  Neurological: She is alert and oriented to person, place, and time.   Skin: Skin is warm. She is not diaphoretic. There is erythema (per picture ).   Psychiatric: She has a normal mood and affect. Her behavior is normal.       ED Course     ED Course   (2120) Pt was seen and evaluated in the emergency department.  She is afebrile and vitally stable.  Ordered CBC, CMP, CRP, ESR, and right hand x-ray.  (2230) CBC did not show leukocytosis, but did show anemia, hemoglobin 9.9.  CMP showing creatinine 1.51, but does have elevated ranges in past.  Sed rate elevated 45, CRP elevated 65.9.  X-ray not showing any acute changes.  INR 2.2, therapeutic.  (2250) reached out to orthopedics.  Patient given 0.5 Dilaudid.  (6055) discussed case with orthopedics via phone.   (0000) orthopedics resident assessed patient.  Not believing this to be osteomyelitis at this time, " but concern for cellulitis, with or without abscess. requesting ultrasound to evaluate for abscess.  US ordered.  Pain did improve with medication.  (0115) ultrasound not showing abscess per ED read. Do see cobblestoning indicative of cellulitis.  Ordered vancomycin and rocephin.   (0135) ultrasound reviewed showing edema with no underlying abscess.  Admission to medicine order placed.  (0205) discussed with medicine physician, service will accept.  She will be admitted.      Procedures          Critical Care time:  none       Labs Ordered and Resulted from Time of ED Arrival Up to the Time of Departure from the ED   CBC WITH PLATELETS DIFFERENTIAL - Abnormal; Notable for the following:        Result Value    RBC Count 3.59 (*)     Hemoglobin 9.9 (*)     Hematocrit 31.7 (*)     MCHC 31.2 (*)     RDW 15.5 (*)     All other components within normal limits   COMPREHENSIVE METABOLIC PANEL - Abnormal; Notable for the following:     Glucose 123 (*)     Urea Nitrogen 51 (*)     Creatinine 1.51 (*)     GFR Estimate 32 (*)     GFR Estimate If Black 39 (*)     All other components within normal limits   ERYTHROCYTE SEDIMENTATION RATE AUTO - Abnormal; Notable for the following:     Sed Rate 45 (*)     All other components within normal limits   CRP INFLAMMATION - Abnormal; Notable for the following:     CRP Inflammation 65.9 (*)     All other components within normal limits   INR - Abnormal; Notable for the following:     INR 2.20 (*)     All other components within normal limits          Assessments & Plan (with Medical Decision Making)   89-year-old female presents from urgent care with right swollen hand and erythema.    Differential diagnosis includes but not limited to cellulitis, osteomyelitis, flexor tenosynovitis, septic arthritis.    On Exam, patient does have second and third digits of right hand rather swollen.  More dorsal than palmar involvement of pain and erythema.  Patient does have elevated CRP and ESR,  however does not have fever or leukocytosis.  Reached out to orthopedics for evaluation in emergency department.  Orthopedics resident evaluated and assessed.  Believing skin more in line with cellulitic nature.  Ultrasound ordered to assess for abscess.  With dorsal aspect more involved the palmar aspect, less concern for flexor tenosynovitis.  Septic arthritis not ruled out at this time, however patient does have history of degenerative changes, thus no emergent intervention and OR at this time.  Patient was started on vancomycin and Rocephin IV.  Ultrasound not showing indications of abscess.    Patient will be admitted to medicine.  Orthopedics will continue to follow while inpatient.    I have reviewed the nursing notes.    I have reviewed the findings, diagnosis, plan and need for follow up with the patient.    New Prescriptions    No medications on file       Final diagnoses:   Cellulitis of hand, right       2/13/2018   Aakash Contreras DO, MA  Family Medicine PGY-1  [facility], [place], EMERGENCY DEPARTMENT

## 2018-02-14 NOTE — PHARMACY-VANCOMYCIN DOSING SERVICE
Pharmacy Vancomycin Initial Note  Date of Service 2018  Patient's  1927  89 year old, female    Indication: Skin and Soft Tissue Infection    Current estimated CrCl = Estimated Creatinine Clearance: 25.9 mL/min (based on Cr of 1.51).    Creatinine for last 3 days  2018:  9:59 PM Creatinine 1.51 mg/dL    Recent Vancomycin Level(s) for last 3 days  No results found for requested labs within last 72 hours.      Vancomycin IV Administrations (past 72 hours)                   vancomycin (VANCOCIN) 1000 mg in dextrose 5% 200 mL PREMIX (mg) 1,000 mg New Bag 18 0143                Nephrotoxins and other renal medications (Future)    Start     Dose/Rate Route Frequency Ordered Stop    18 0800  furosemide (LASIX) tablet 40 mg      40 mg Oral EVERY MORNING 18 0255            Contrast Orders - past 72 hours     None                Plan:  1.  Start vancomycin  1000 mg IV q24h.   2.  Goal Trough Level: 10-15 mg/L   3.  Pharmacy will check trough levels as appropriate in 1-3 Days.  4. Serum creatinine levels will be ordered daily for the first week of therapy and at least twice weekly for subsequent weeks.    5. Bancroft method utilized to dose vancomycin therapy: Method 2    Clover Martinez

## 2018-02-14 NOTE — PROGRESS NOTES
"Ortho Progress Note     Subjective:  Pain stable overnight, maybe slightly better since 0600 this AM.  No fevers/chills.    Objective:  /48 (BP Location: Right arm)  Pulse 82  Temp 99.3  F (37.4  C) (Oral)  Resp 16  Ht 1.651 m (5' 5\")  Wt 66.3 kg (146 lb 3.2 oz)  SpO2 98%  Breastfeeding? No  BMI 24.33 kg/m2  Gen: A&Ox3, no acute distress  CV: 2+ dp/pt pulses, capillary refill < 2sec  Resp: breathing equal and non-labored, no wheezing  MSK:     RUE       Stable erythema of dorsal index, long fingers and dorsal distal hand, has not moved past margins demarcated overnight   Minimally flexes/extends digits secondary to pain        SILT R/U/M       Motor:  Fires EPL, FPL, abductors       Perfusion:  Warm and well perfused in distal digits, palpable radial pulse     Hemoglobin   Date Value Ref Range Status   02/14/2018 9.6 (L) 11.7 - 15.7 g/dL Final   02/13/2018 9.9 (L) 11.7 - 15.7 g/dL Final     CRP 72  (66 last night)      Assessment/Plan:  89F with concern for cellulitis.      Discussed with internal medicine team this AM, they favor diagnosis of pseudogout.  This would be consistent with the onset (two adjacent PIP joints becoming painful at same time), however would not expect this degree of erythema / tissue cobblestoning of dorsal distal hand from a pseudogout flare.      Medicine team planning to stop Abx and start prednisone, with close clinical monitoring over next several hours to evaluate for change in symptoms.  If improves on prednisone, favor CPPD diagnosis.  If no improvement or worsens, they plan to re-start antibiotics for infection diagnosis.    Orthopedics will continue to follow.  Would not plan for surgical management unless diagnosis of infection confirmed, and abscess develops (ultrasound evaluation to determine) or PIP joints do not improve despite improving surrounding erythema on antibiotic therapy.      Hiren France MD  Orthopaedic Surgery PGY-4  Pager:  [phone]    "

## 2018-02-14 NOTE — PLAN OF CARE
Problem: Skin and Soft Tissue Infection (Adult)  Goal: Signs and Symptoms of Listed Potential Problems Will be Absent, Minimized or Managed (Skin and Soft Tissue Infection)  Signs and symptoms of listed potential problems will be absent, minimized or managed by discharge/transition of care (reference Skin and Soft Tissue Infection (Adult) CPG).   Outcome: No Change    VS: Stable   O2: 98% on RA    Output: Voiding adequate amts in the bathroom    Last BM: 2/12/18   Activity: Ambulating with SBA and FWW    Skin: Redness at right hand and wrist. Redness decreasing after prednisone started.     Pain: Pain tolerable and pt declines any interventions other than limb  elevation.    CMS: Intact    Dressing: None    Diet: Tolerating regular diet with fair appetite.    LDA: PIV SL    Equipment: FWW with ambulation .    Plan: Determine pseudogout vs. Infection. Continue to monitor inflammatory response with prednisone use and adjust therapies accordingly.    Additional Info: Mild confusion possibly related to age, poor sleep or opoid use in ED. Bed alarm activated and pt used call light appropriately.

## 2018-02-14 NOTE — PHARMACY-ANTICOAGULATION SERVICE
Clinical Pharmacy - Warfarin Dosing Consult     Pharmacy has been consulted to manage this patient s warfarin therapy.  Indication: Atrial Fibrillation  Therapy Goal: INR 2-3  Warfarin Prior to Admission: Yes  Warfarin PTA Regimen: unknown at this time.  7.5 mg Fridays and 5 mg ROW   Significant drug interactions: tylenol   melatonin   spironolactone   levothyroxine  Recent documented change in oral intake/nutrition: Unknown    INR   Date Value Ref Range Status   02/13/2018 2.20 (H) 0.86 - 1.14 Final   11/07/2016 2.38 (H) 0.86 - 1.14 Final       Patient arrived on floor ~0330 2-14.  Was able to state she did not take warfarin on 2-13 but did not state dose she was on. She refused warfarin at this time stating she just wanted to go to sleep.  The INR was drawn 2-13 ~2200 and  still in goal range. Will leave note for pharmacy to check her am INR level and give her dose early to insure patient remains in target range.  Pharmacy will monitor Jayne Wardbradysunil daily and order warfarin doses to achieve specified goal.      Please contact pharmacy as soon as possible if the warfarin needs to be held for a procedure or if the warfarin goals change.

## 2018-02-14 NOTE — PROGRESS NOTES
"Patient was admitted last night with pain and swelling right hand for last 4 days  No fever or chills  Has had this once in the past , was told it was pseudogout and improved with steroids  Vital signs:  Temp: 99.3  F (37.4  C) Temp src: Oral BP: 123/48 Pulse: 82 Heart Rate: 82 Resp: 16 SpO2: 98 % O2 Device: Nasal cannula Oxygen Delivery: 1.5 LPM Height: 165.1 cm (5' 5\") Weight: 66.3 kg (146 lb 3.2 oz)  Estimated body mass index is 24.33 kg/(m^2) as calculated from the following:    Height as of this encounter: 1.651 m (5' 5\").    Weight as of this encounter: 66.3 kg (146 lb 3.2 oz).   Alert and oriented   Neck ; supple  Chest ; clear bl  cvs ; s1 ands 2 , irregular   Gi ; soft abdomen, bs positive  Ext ; right hand dorsum and joints PIP swollen and tender to touch   Labs ; uric acid 10 ,   Wbc 7.4  crp 72 from 65  Creatinine 1.37 ( 1.57)  A/P  Right hand swelling , pain ; normal wbc, no fevers.  Clinically seems pseudogout   Will give one time prednisone ( no cochicine/NSAIDs due to kidney function ) and monitor  Dc abx for now ( got vanco and ceftriaxone )  Monitor clinically   Elevate hand  Raudel ortho input     Chronic a fib and avr ; on coumadin     HFpEF ; ct lasix, spironolactone, coreg, atorvastatin   '  Hx delirium ; dc IV dilaudid , low dose oxycodone and tylenol     DVT prop ; on warfarin  GI ; zantac  Addendum ; saw patient again at 1 pm . Hand looks better and she felt better. Slept and rested    Spoke with ortho attending   Will resume Betty abx  "

## 2018-02-14 NOTE — ED NOTES
[facility], [place]   ED Nurse to Floor Handoff     Jayne Nino is a 89 year old female who speaks English and lives ,  in a home  They arrived in the ED by car from emergency room    ED Chief Complaint: Hand Pain (Pt has swelling and redness to (R) hand. Sent here from urgent care. No known injury)    ED Dx;   Final diagnoses:   Cellulitis of hand, right         Needed?: No    Allergies:   Allergies   Allergen Reactions     Oxycodone      Pt states she takes it for back pain, only causes itching   .  Past Medical Hx:   Past Medical History:   Diagnosis Date     Heart disease       Baseline Mental status: WDL  Current Mental Status changes: at basesline    Infection: Yes  Sepsis suspected: No  Isolation type: No active isolations     Activity level - Baseline/Home:  Independent  Activity Level - Current:   Independent    Bariatric equipment needed?: No    In the ED these meds were given:   Medications   HYDROmorphone (DILAUDID) tablet 2 mg (2 mg Oral Given 2/14/18 0135)   vancomycin (VANCOCIN) 1000 mg in dextrose 5% 200 mL PREMIX (1,000 mg Intravenous New Bag 2/14/18 0143)   HYDROmorphone (PF) (DILAUDID) injection 0.5 mg (0.5 mg Intravenous Given 2/13/18 2350)   cefTRIAXone (ROCEPHIN) 1 g in 10 mL SWFI Premix Syringe (1 g Intravenous Given 2/14/18 0137)       Drips running?  No    Home pump or pre-existing LDA's present? Yes    Labs results:   Labs Ordered and Resulted from Time of ED Arrival Up to the Time of Departure from the ED   CBC WITH PLATELETS DIFFERENTIAL - Abnormal; Notable for the following:        Result Value    RBC Count 3.59 (*)     Hemoglobin 9.9 (*)     Hematocrit 31.7 (*)     MCHC 31.2 (*)     RDW 15.5 (*)     All other components within normal limits   COMPREHENSIVE METABOLIC PANEL - Abnormal; Notable for the following:     Glucose 123 (*)     Urea Nitrogen 51 (*)     Creatinine 1.51 (*)     GFR Estimate 32 (*)     GFR Estimate If Black 39 (*)      "All other components within normal limits   ERYTHROCYTE SEDIMENTATION RATE AUTO - Abnormal; Notable for the following:     Sed Rate 45 (*)     All other components within normal limits   CRP INFLAMMATION - Abnormal; Notable for the following:     CRP Inflammation 65.9 (*)     All other components within normal limits   INR - Abnormal; Notable for the following:     INR 2.20 (*)     All other components within normal limits       Imaging Studies:   Recent Results (from the past 24 hour(s))   Hand XR, G/E 3 views, right    Narrative    HAND RIGHT THREE OR MORE VIEWS   2/13/2018 10:33 PM     HISTORY: Rule out osteomyelitis.    COMPARISON: None.      Impression    IMPRESSION: No convincing destructive bony lesions. However, if  further assessment is desired, MRI could provide more sensitive  assessment. No fractures identified. Scattered degenerative joint  disease changes.    KELLY DONIS MD   US Extremity Non Vascular Right    Narrative    ULTRASOUND SOFT TISSUES RIGHT INDEX FINGER LIMITED  2/14/2018 12:50 AM      HISTORY: Concern for abscess in the dorsal aspect of the right hand.    COMPARISON: None.    TECHNIQUE: Focused ultrasound scanning was performed by the ultrasound  technologist of the soft tissues of the dorsal aspect of the base of  the right second finger, at the site of clinical concern.      Impression    IMPRESSION: Edema is present within the soft tissues of the dorsal  aspect of the right hand at the base of the second finger, at the site  of clinical concern. No underlying abscess is visualized.       Recent vital signs:   /69  Temp 97  F (36.1  C) (Oral)  Resp 18  Ht 1.651 m (5' 5\")  Wt 66.7 kg (147 lb)  SpO2 98%  Breastfeeding? No  BMI 24.46 kg/m2    Cardiac Rhythm: no    Pt needs tele? No  Skin/wound Issues: Pt has infection cellulitis erythema to rt hand    Code Status: Full Code    Pain control: good    Nausea control: good    Abnormal labs/tests/findings requiring intervention: CBC " WBC     Family present during ED course? Yes   Family Comments/Social Situation comments:  went home for the evening and does not know l;ocation please contact in the am.    Tasks needing completion: continue abx, evaluate pain. MD assessment.    Petey Tilley RN  [facility]-- [phone] [place] ED  [phone] [facility] ED

## 2018-02-14 NOTE — CONSULTS
Orthopedic Surgery Consult / History and Physical    Jayne Nino MRN# [id number]   Age: 89 year old YOB: 1927     Date of Admission:  2/13/2018    Primary care provider: Dinora Jimenez           Assessment and Plan:   Assessment:  R dorsal hand cellulitis, possible long finger PIP septic arthritis, though now four days into infectious process     Plan:  1. Recommend admit to medicine for IV antiboitics  2. Ortho will follow to clinically monitor response to antibiotics.   3. Will discuss PIP joint involvement with Dr Olvera in AM.             Chief Complaint:   R hand infection           History of Present Illness:   This patient is a Right hand dominat 89 year old female with a significant past medical history of aortic valve replacement who presents with 4 days dorsal R hand swelling.    Insidious onset, no antecedent skin injuries. Started in index + long PIPs. Since then the erythema, pain, swelling has spread across those two digits and over dorsal hand. Pain worsening over time.     Felt some chills previously, not at present.     Symptom Profile  Quality of symptoms:   Achy/sharp  Severity:   Moderate to severe  Exacerbating:    Movement, palpation of erythematous areas  Alleviate:   rest            Past Medical History:     Past Medical History:   Diagnosis Date     Heart disease             Past Surgical History:     Past Surgical History:   Procedure Laterality Date     ORTHOPEDIC SURGERY       REPLACE VALVE AORTIC              Social History:   Smoking: none  Living situation:  With son  Ambulatory status:  Household, uses walker      Social History   Substance Use Topics     Smoking status: Never Smoker     Smokeless tobacco: Never Used     Alcohol use No            Family History:   Denies family history of bleeding or clotting disorders  Family History   Problem Relation Age of Onset     Coronary Artery Disease Mother      Coronary Artery Disease Father      CEREBROVASCULAR  "DISEASE Mother      Breast Cancer No family hx of      Colon Cancer No family hx of      Other Cancer No family hx of             Allergies:     Allergies   Allergen Reactions     Oxycodone      Pt states she takes it for back pain, only causes itching            Medications:   Anticoagulants:  Warfarin  No current facility-administered medications for this encounter.      Current Outpatient Prescriptions   Medication     furosemide (LASIX) 40 MG tablet     spironolactone (ALDACTONE) 50 MG tablet     potassium chloride SA (POTASSIUM CHLORIDE) 10 MEQ tablet     cefdinir (OMNICEF) 300 MG capsule     oxyCODONE-acetaminophen (PERCOCET) 5-325 MG per tablet     warfarin (COUMADIN) 5 MG tablet     carvedilol (COREG) 25 MG tablet     ferrous sulfate 140 (45 FE) MG TBCR     metolazone (ZAROXOLYN) 2.5 MG tablet     atorvastatin (LIPITOR) 80 MG tablet     polyethylene glycol 0.4%- propylene glycol 0.3% (SYSTANE ULTRA) 0.4-0.3 % SOLN ophthalmic solution     famotidine (PEPCID) 20 MG tablet     acetaminophen (TYLENOL) 325 MG tablet     levothyroxine (SYNTHROID, LEVOTHROID) 125 MCG tablet            Physical Exam:   Patient Vitals for the past 8 hrs:   BP Temp Temp src Heart Rate Resp SpO2 Height Weight   02/13/18 2049 153/69 97  F (36.1  C) Oral 102 18 98 % 1.651 m (5' 5\") 66.7 kg (147 lb)     General: awake, alert, cooperative, no apparent distress, appears stated age  HEENT: normal  Respiratory: breathing non-labored  Cardiovascular: peripheral pulses palpable and symmetric, capillary refill < 2sec  Skin: no rashes or lesions  Neurological: CN II-XII grossly intact  Musculoskeletal:     RUE       Skin with no lacerations or open wounds   Significant erythema over distal dorsal hand, and across index and long fingers        SILT R/U/M, across all distal digits       Motor:  Fires EPL, FPL, abductors       Perfusion:  Warm and well perfused in distal digits, palpable radial pulse            Data:   ESR  45  CRP  66  WBC  " 8.2      Imaging:  XR with no abnormalities    US shows no large abscesses available to drain over dorsal hand.          Hiren France  Orthopaedic Surgery PGY-4  Pager:  [phone]

## 2018-02-15 ENCOUNTER — APPOINTMENT (OUTPATIENT)
Dept: GENERAL RADIOLOGY | Facility: CLINIC | Age: 83
DRG: 603 | End: 2018-02-15
Attending: ORTHOPAEDIC SURGERY
Payer: MEDICARE

## 2018-02-15 LAB
ANION GAP SERPL CALCULATED.3IONS-SCNC: 10 MMOL/L (ref 3–14)
BUN SERPL-MCNC: 57 MG/DL (ref 7–30)
CALCIUM SERPL-MCNC: 8.7 MG/DL (ref 8.5–10.1)
CHLORIDE SERPL-SCNC: 101 MMOL/L (ref 94–109)
CO2 SERPL-SCNC: 26 MMOL/L (ref 20–32)
CREAT SERPL-MCNC: 1.68 MG/DL (ref 0.52–1.04)
CRP SERPL-MCNC: 88.1 MG/L (ref 0–8)
ERYTHROCYTE [DISTWIDTH] IN BLOOD BY AUTOMATED COUNT: 15.5 % (ref 10–15)
GFR SERPL CREATININE-BSD FRML MDRD: 29 ML/MIN/1.7M2
GLUCOSE SERPL-MCNC: 139 MG/DL (ref 70–99)
HCT VFR BLD AUTO: 28.7 % (ref 35–47)
HGB BLD-MCNC: 9 G/DL (ref 11.7–15.7)
INR PPP: 2.13 (ref 0.86–1.14)
MCH RBC QN AUTO: 27.6 PG (ref 26.5–33)
MCHC RBC AUTO-ENTMCNC: 31.4 G/DL (ref 31.5–36.5)
MCV RBC AUTO: 88 FL (ref 78–100)
PLATELET # BLD AUTO: 163 10E9/L (ref 150–450)
POTASSIUM SERPL-SCNC: 3.8 MMOL/L (ref 3.4–5.3)
PROCALCITONIN SERPL-MCNC: 0.09 NG/ML
RBC # BLD AUTO: 3.26 10E12/L (ref 3.8–5.2)
SODIUM SERPL-SCNC: 137 MMOL/L (ref 133–144)
URATE SERPL-MCNC: 10.6 MG/DL (ref 2.6–6)
WBC # BLD AUTO: 7.5 10E9/L (ref 4–11)

## 2018-02-15 PROCEDURE — 25000128 H RX IP 250 OP 636: Performed by: INTERNAL MEDICINE

## 2018-02-15 PROCEDURE — 25000128 H RX IP 250 OP 636: Performed by: STUDENT IN AN ORGANIZED HEALTH CARE EDUCATION/TRAINING PROGRAM

## 2018-02-15 PROCEDURE — 25000132 ZZH RX MED GY IP 250 OP 250 PS 637: Mod: GY | Performed by: INTERNAL MEDICINE

## 2018-02-15 PROCEDURE — 99239 HOSP IP/OBS DSCHRG MGMT >30: CPT | Performed by: INTERNAL MEDICINE

## 2018-02-15 PROCEDURE — 85610 PROTHROMBIN TIME: CPT | Performed by: INTERNAL MEDICINE

## 2018-02-15 PROCEDURE — 36415 COLL VENOUS BLD VENIPUNCTURE: CPT | Performed by: INTERNAL MEDICINE

## 2018-02-15 PROCEDURE — 84550 ASSAY OF BLOOD/URIC ACID: CPT | Performed by: INTERNAL MEDICINE

## 2018-02-15 PROCEDURE — A9270 NON-COVERED ITEM OR SERVICE: HCPCS | Mod: GY | Performed by: INTERNAL MEDICINE

## 2018-02-15 PROCEDURE — 85027 COMPLETE CBC AUTOMATED: CPT | Performed by: INTERNAL MEDICINE

## 2018-02-15 PROCEDURE — 25000125 ZZHC RX 250: Performed by: INTERNAL MEDICINE

## 2018-02-15 PROCEDURE — 27210995 ZZH RX 272: Performed by: INTERNAL MEDICINE

## 2018-02-15 PROCEDURE — 12000001 ZZH R&B MED SURG/OB UMMC

## 2018-02-15 PROCEDURE — 84145 PROCALCITONIN (PCT): CPT | Performed by: INTERNAL MEDICINE

## 2018-02-15 PROCEDURE — 73130 X-RAY EXAM OF HAND: CPT | Mod: RT

## 2018-02-15 PROCEDURE — 80048 BASIC METABOLIC PNL TOTAL CA: CPT | Performed by: INTERNAL MEDICINE

## 2018-02-15 PROCEDURE — 86140 C-REACTIVE PROTEIN: CPT | Performed by: INTERNAL MEDICINE

## 2018-02-15 RX ORDER — PREDNISONE 20 MG/1
20 TABLET ORAL DAILY
Status: DISCONTINUED | OUTPATIENT
Start: 2018-02-16 | End: 2018-02-16 | Stop reason: HOSPADM

## 2018-02-15 RX ORDER — POTASSIUM CHLORIDE 750 MG/1
10 TABLET, EXTENDED RELEASE ORAL
Status: DISCONTINUED | OUTPATIENT
Start: 2018-02-15 | End: 2018-02-16

## 2018-02-15 RX ORDER — WARFARIN SODIUM 2.5 MG/1
5 TABLET ORAL
Status: COMPLETED | OUTPATIENT
Start: 2018-02-15 | End: 2018-02-15

## 2018-02-15 RX ORDER — FUROSEMIDE 40 MG
40 TABLET ORAL
Status: DISCONTINUED | OUTPATIENT
Start: 2018-02-15 | End: 2018-02-16

## 2018-02-15 RX ORDER — SODIUM CHLORIDE 9 MG/ML
INJECTION, SOLUTION INTRAVENOUS CONTINUOUS
Status: DISCONTINUED | OUTPATIENT
Start: 2018-02-16 | End: 2018-02-16

## 2018-02-15 RX ADMIN — ACETAMINOPHEN 650 MG: 325 TABLET, FILM COATED ORAL at 22:25

## 2018-02-15 RX ADMIN — CARVEDILOL 25 MG: 25 TABLET, FILM COATED ORAL at 19:34

## 2018-02-15 RX ADMIN — POTASSIUM CHLORIDE 10 MEQ: 750 TABLET, FILM COATED, EXTENDED RELEASE ORAL at 08:15

## 2018-02-15 RX ADMIN — PREDNISONE 30 MG: 5 TABLET ORAL at 10:03

## 2018-02-15 RX ADMIN — SODIUM CHLORIDE, POTASSIUM CHLORIDE, SODIUM LACTATE AND CALCIUM CHLORIDE 250 ML: 600; 310; 30; 20 INJECTION, SOLUTION INTRAVENOUS at 00:06

## 2018-02-15 RX ADMIN — VANCOMYCIN HYDROCHLORIDE 1000 MG: 1 INJECTION, SOLUTION INTRAVENOUS at 01:54

## 2018-02-15 RX ADMIN — FUROSEMIDE 40 MG: 40 TABLET ORAL at 08:07

## 2018-02-15 RX ADMIN — ATORVASTATIN CALCIUM 80 MG: 40 TABLET, FILM COATED ORAL at 22:25

## 2018-02-15 RX ADMIN — CARVEDILOL 25 MG: 25 TABLET, FILM COATED ORAL at 08:07

## 2018-02-15 RX ADMIN — CEFTRIAXONE SODIUM 1 G: 10 INJECTION, POWDER, FOR SOLUTION INTRAVENOUS at 01:21

## 2018-02-15 RX ADMIN — WARFARIN SODIUM 5 MG: 2.5 TABLET ORAL at 18:01

## 2018-02-15 RX ADMIN — RANITIDINE 150 MG: 150 TABLET ORAL at 08:15

## 2018-02-15 RX ADMIN — SPIRONOLACTONE 50 MG: 25 TABLET ORAL at 08:07

## 2018-02-15 ASSESSMENT — ACTIVITIES OF DAILY LIVING (ADL)
ADLS_ACUITY_SCORE: 18
ADLS_ACUITY_SCORE: 19
ADLS_ACUITY_SCORE: 19
ADLS_ACUITY_SCORE: 18
ADLS_ACUITY_SCORE: 19
ADLS_ACUITY_SCORE: 18

## 2018-02-15 ASSESSMENT — PAIN DESCRIPTION - DESCRIPTORS
DESCRIPTORS: ACHING
DESCRIPTORS: ACHING

## 2018-02-15 NOTE — PROGRESS NOTES
NPO after midnight order placed in case of clinical worsening.    If patient does not clinically worsen overnight, will clear for diet on morning rounds (around 0630)    Hiren France MD  Orthopaedic Surgery PGY-4  Pager:  [phone]

## 2018-02-15 NOTE — PLAN OF CARE
Problem: Patient Care Overview  Goal: Plan of Care/Patient Progress Review  Outcome: Improving  LS clear, equal bilaterally. BS active, last BM 2 days ago. Rt. Hand redness and swelling is receeding. Still painful, but Pt. Reports less painful than before. Refusing pain meds. Appears comfortable. Up SBA of 1 with a walker. PIV Rt. forearm is WDL. Had a few episodes of low BP this jaron. Was asymptomatic, no reports of dizziness. MD notified, 250 cc LR bolus given. A & O x 4. Stir tsai for dinner caused minor heart burn, Tums given. Slight edema in ankles. Many spider veins on tops of both feet. Call light with in reach, Pt. Able to make needs known, continue with POC.

## 2018-02-15 NOTE — PROGRESS NOTES
"  Patient feels much better   Pain and swelling much reduced   No fever   Hoping to go home soon   Vital signs:  Temp: 96  F (35.6  C) Temp src: Oral BP: 122/48 Pulse: 79 Heart Rate: 84 Resp: 21 SpO2: 98 % O2 Device: None (Room air) Oxygen Delivery: 1.5 LPM Height: 165.1 cm (5' 5\") Weight: 66.3 kg (146 lb 3.2 oz)  Estimated body mass index is 24.33 kg/(m^2) as calculated from the following:    Height as of this encounter: 1.651 m (5' 5\").    Weight as of this encounter: 66.3 kg (146 lb 3.2 oz).    Alert and oriented   Neck ; supple  Chest ; clear bl  cvs ; s1 ands 2 , irregular   Gi ; soft abdomen, bs positive  Ext ; right hand dorsum and joints PIP swollen and tender to touch   Labs ; uric acid 10.6 , pro-calcitonin 0.09 . Creatinine 1.68  Wbc 7.4  A/P  Right hand swelling , pain ; normal wbc, no fevers.  Clinically seemed pseudogout ? Infection   Will dc vanco  Ct prednisone 2/3  Ct ceftraxone  Raudel ortho input     YURY on CKD ; likely due to vanco. DC . Hold lasix      Chronic a fib and avr ; on coumadin . INR therapeutic     HFpEF ; ct lasix, spironolactone, coreg, atorvastatin . Hold lasix today as creatinine worse 2/15  '  Hx delirium ; dc IV dilaudid , low dose oxycodone and tylenol   Dispo ; home in am if continuing to improve  "

## 2018-02-15 NOTE — PLAN OF CARE
Problem: Patient Care Overview  Goal: Plan of Care/Patient Progress Review  Outcome: No Change  A&Ox4, VSS- BP soft this afternoon. LS clear, encouraged use of IS. BS active- pt tolerating a regular diet, pt's last BM was 2/12. Encouraged stool softener/suppository- pt refused.  CMS/Neuros intact, pt denies numbness/tingling, chest pain/SOB, N/V. Right hand swollen- redness has improved and swelling has decreased. Pt is able to move fingers, encouraged pt to elevate hand, R hand is warm to the touch. Pt reports hand is sore, but does not want anything for pain. Pt frustrated that she is not going home today, would like to D/C home before the weekend. Nutrition consult put in, pt had some questions regarding foods/fruits she cannot have that may affect her Gout. Pt is able to make needs known, uses call light appropriately, continue with POC.

## 2018-02-15 NOTE — PLAN OF CARE
Problem: Patient Care Overview  Goal: Plan of Care/Patient Progress Review    Alert and oriented. Able to make needs known. Using call light appropriately. C/O right hand pain but declines pain meds. Reports that her hand is less painful than previous day. Redness and swelling has not gone beyond marking.  CMS/Neuros unchanged.  PIV patent in right arm, saline locked between antibiotics. At beginning of shift received a 250 ml  fluid bolus. BP 70s-/80s/ 40s before, 97/40 after bolus.  Up to BR with assist of 1 and walked. Denies nausea, headache, dizziness, lightheadedness, chest pain or SOB.  Voiding spontaneously. Appears to be sleeping during rounds. Continue with plan of care.

## 2018-02-15 NOTE — DISCHARGE SUMMARY
" Internal Medicine Discharge Summary       Jayne Nino MRN# [id number]   YOB: 1927 Age: 89 year old     Date of Admission:  2/13/2018  Date of Discharge:  2/16  Admitting Physician:  Kishan Finn MD  Discharge Physician:  Ana Cabrera  Discharging Service:  Internal Medicine     Primary Provider: Dinora Jimenez              Discharge Diagnosis:   Pseudogout vs cellulitis of hand   CKD  Chronic Afib  AVR  HFpEF           Consultations:   Orthopedic surgery        Hospital Course by Problem:    89 year old woman with hx of pseudogout was admitted to the hospital with complaints of pain and swelling of PIP joints ( dorcas index, middle and ring finger ) and swelling of dorsum of right hand . The picture was clinically compatible with pseudogout but was hard to differentiate as she got IV abx vanco and ceftriaxone , and her hand also seemed cellulitic. She was given prednisone 2/14 , 2/15 and 2/16 and kept on IV abx during this hospitalization .  She did have a slight increase in her serum creatinine likely secondary to vancomycin . She also takes lasix and aldactone for her heart failure , lasix was held for a day and started again on dc   On Exam ;   Alert and oriented . No acute distress  Vital signs:  Temp: 96.3  F (35.7  C) Temp src: Oral BP: 142/71 Pulse: 73 Heart Rate: 88 Resp: 16 SpO2: 97 % O2 Device: None (Room air) Oxygen Delivery: 1.5 LPM Height: 165.1 cm (5' 5\") Weight: 66.3 kg (146 lb 3.2 oz)  Estimated body mass index is 24.33 kg/(m^2) as calculated from the following:    Height as of this encounter: 1.651 m (5' 5\").    Weight as of this encounter: 66.3 kg (146 lb 3.2 oz).  Neck ; supple , no JVD  Chest ; equal BS bilaterally , no rales or rhonchi   CVS; RRR, no murmur /rubs or gallops  GI ; soft abdomen, non tender, BS positive  Ext ; right hand swelling and redness much improved , ROM of all 5 fingers  Neuro ; CN 2 to 12 grossly intact , No Facial asymmetry noticed  .  Psych ; " appropriate mood and effect  Skin ; no rash or purpura on exposed skin     ROUTINE IP LABS (Last four results)  BMP    Recent Labs  Lab 02/15/18  0600 02/14/18  0558 02/13/18  2159    139 138   POTASSIUM 3.8 3.4 3.5   CHLORIDE 101 104 104   NAVARRO 8.7 9.0 9.3   CO2 26 28 27   BUN 57* 47* 51*   CR 1.68* 1.37* 1.51*   * 103* 123*     CBC  Recent Labs  Lab 02/15/18  0600 02/14/18  0558 02/13/18 2159   WBC 7.5 7.4 8.2   RBC 3.26* 3.47* 3.59*   HGB 9.0* 9.6* 9.9*   HCT 28.7* 30.6* 31.7*   MCV 88 88 88   MCH 27.6 27.7 27.6   MCHC 31.4* 31.4* 31.2*   RDW 15.5* 15.6* 15.5*    163 165     INR  Recent Labs  Lab 02/15/18  0600 02/14/18  0806 02/13/18 2159   INR 2.13* 2.16* 2.20*       Results for orders placed or performed during the hospital encounter of 02/13/18   Hand XR, G/E 3 views, right    Narrative    HAND RIGHT THREE OR MORE VIEWS   2/13/2018 10:33 PM     HISTORY: Rule out osteomyelitis.    COMPARISON: None.      Impression    IMPRESSION: No convincing destructive bony lesions. However, if  further assessment is desired, MRI could provide more sensitive  assessment. No fractures identified. Scattered degenerative joint  disease changes.    KELLY DONIS MD   US Extremity Non Vascular Right    Narrative    ULTRASOUND SOFT TISSUES RIGHT INDEX FINGER LIMITED  2/14/2018 12:50 AM      HISTORY: Concern for abscess in the dorsal aspect of the right hand.    COMPARISON: None.    TECHNIQUE: Focused ultrasound scanning was performed by the ultrasound  technologist of the soft tissues of the dorsal aspect of the base of  the right second finger, at the site of clinical concern.      Impression    IMPRESSION: Edema is present within the soft tissues of the dorsal  aspect of the right hand at the base of the second finger, at the site  of clinical concern. No underlying abscess is visualized.    MICHI CRANDALL MD   XR Hand Right G/E 3 Views    Narrative    History: Right hand pain and history of  osteoarthritis.    COMPARISON: Three-view right hand 2/13/2018.    FINDINGS: Joint space narrowing with subchondral sclerosis about the  MCP, PIP and DIP joints along with scattered small marginal  osteophytes. Moderate osteoarthrosis first carpometacarpal joint with  early subluxation of the first metacarpal base off the trapezium. Bone  on bone to the distal scaphoid articulations with prominent  subchondral sclerosis in the distal scaphoid as well as the trapezium  and trapezoid. Prominent subchondral cyst in the proximal triquetrum.  Joint space narrowing at the second carpometacarpal joint. Narrowing  and mild subchondral cortical irregularity about the radial styloid  and the mid scaphoid about the radiocarpal joint. No acute fracture,  dislocation or radiopaque foreign body about the right hand and wrist  on this single image. Soft tissue swelling is noted about the  interphalangeal joints of the digits. No erosive changes about the  joint margins of the hand and wrist.      Impression    IMPRESSION: Diffuse changes of osteoarthrosis about the right hand and  wrist without acute bony abnormality on single view. There may be  early secondary changes of erosive osteoarthritis about the PIP joints  although no clear evidence for central erosions at this time.    JACQUES GREEN MD              Discharge Medications:     Current Discharge Medication List      START taking these medications    Details   methylPREDNISolone (MEDROL DOSEPAK) 4 MG tablet Follow package instructions  Qty: 21 tablet, Refills: 0    Associated Diagnoses: Idiopathic gout, unspecified chronicity, unspecified site      cephALEXin (KEFLEX) 500 MG capsule Take 1 capsule (500 mg) by mouth 2 times daily  Qty: 20 capsule, Refills: 0    Associated Diagnoses: Cellulitis of hand, right         CONTINUE these medications which have NOT CHANGED    Details   furosemide (LASIX) 40 MG tablet Take 1 tablet (40 mg) by mouth every morning  Qty: 30 tablet,  Refills: 0    Associated Diagnoses: (HFpEF) heart failure with preserved ejection fraction (H)      spironolactone (ALDACTONE) 50 MG tablet Take 1 tablet (50 mg) by mouth daily  Qty: 30 tablet, Refills: 0    Associated Diagnoses: (HFpEF) heart failure with preserved ejection fraction (H)      potassium chloride SA (POTASSIUM CHLORIDE) 10 MEQ tablet Take 1 tablet (10 mEq) by mouth daily  Qty: 30 tablet, Refills: 0    Associated Diagnoses: Hypokalemia      cefdinir (OMNICEF) 300 MG capsule Take 1 capsule (300 mg) by mouth 2 times daily  Qty: 14 capsule, Refills: 0    Associated Diagnoses: Dysuria      warfarin (COUMADIN) 5 MG tablet Take 5-7.5 mg by mouth daily Take 7.5 mg on Friday and 5 mg all other days of the week      carvedilol (COREG) 25 MG tablet Take 25 mg by mouth 2 times daily      ferrous sulfate 140 (45 FE) MG TBCR Take 140 mg by mouth daily      atorvastatin (LIPITOR) 80 MG tablet Take 80 mg by mouth At Bedtime      polyethylene glycol 0.4%- propylene glycol 0.3% (SYSTANE ULTRA) 0.4-0.3 % SOLN ophthalmic solution Place 1 drop into both eyes every hour as needed for dry eyes      famotidine (PEPCID) 20 MG tablet Take 20 mg by mouth 2 times daily.      acetaminophen (TYLENOL) 325 MG tablet Take 325-650 mg by mouth every 4 hours as needed   Qty: 250 tablet      levothyroxine (SYNTHROID, LEVOTHROID) 125 MCG tablet Take 1 tablet by mouth daily.  Qty: 90 tablet, Refills: 1    Associated Diagnoses: Bradycardia         STOP taking these medications       oxyCODONE-acetaminophen (PERCOCET) 5-325 MG per tablet Comments:   Reason for Stopping:         metolazone (ZAROXOLYN) 2.5 MG tablet Comments:   Reason for Stopping:                    Discharge Instructions and Follow-Up:     Discharge Procedure Orders  Reason for your hospital stay   Order Comments: You were admitted with pain and swelling of your right hand . You were given IV abx and prednisone     Adult [facility] Follow-up and recommended labs and tests    Order Comments: Follow up with PCP in one week for post hospital discharge follow up     Appointments on [place] and/or [facility] (with [facility] or [facility] provider or service). Call [phone] if you haven't heard regarding these appointments within 7 days of discharge.     Activity   Order Comments: As tolerated   Order Specific Question Answer Comments   Is discharge order? Yes      Reason for your hospital stay   Order Comments: You were admitted with swelling and pain right hand . You were given antibiotics and steroids     Adult [facility] Follow-up and recommended labs and tests   Order Comments: Follow up with PCP in one week     Appointments on [place] and/or [facility] (with [facility] or [facility] provider or service). Call [phone] if you haven't heard regarding these appointments within 7 days of discharge.     Activity   Order Comments: As tolerated   Order Specific Question Answer Comments   Is discharge order? Yes      Diet   Order Comments: Regular diet   Order Specific Question Answer Comments   Is discharge order? Yes      Diet   Order Comments: regular   Order Specific Question Answer Comments   Is discharge order? Yes                  Discharge Disposition:   home    33 minutes spent in discharge, including >50% in counseling and coordination of care, medication review and plan of care recommended on follow up. Questions were answered to satisfaction       Ana Cabrera  Internal Medicine Hospitalist & Staff Physician  [facility]

## 2018-02-15 NOTE — PROGRESS NOTES
"Ortho Progress Note     Subjective:  Pain improved overnight.  Erythema improving.  No systemic symptoms. No fevers. Now on IV ceftriaxone and vancomycin.    Objective:  BP 97/40 (BP Location: Right arm)  Pulse 82  Temp 97.3  F (36.3  C) (Oral)  Resp 18  Ht 1.651 m (5' 5\")  Wt 66.3 kg (146 lb 3.2 oz)  SpO2 96%  Breastfeeding? No  BMI 24.33 kg/m2  Gen: A&Ox3, no acute distress  CV: 2+ dp/pt pulses, capillary refill < 2sec  Resp: breathing equal and non-labored, no wheezing  MSK:     RUE       Much improved erythema of dorsal index, long fingers and dorsal distal hand, has not moved past margins demarcated on admission   Improved motion of digits   Much less tender over digits, dorsal hand        SILT R/U/M       Motor:  Fires EPL, FPL, abductors       Perfusion:  Warm and well perfused in distal digits, palpable radial pulse     Hemoglobin   Date Value Ref Range Status   02/15/2018 9.0 (L) 11.7 - 15.7 g/dL Final   02/14/2018 9.6 (L) 11.7 - 15.7 g/dL Final     CRP 88 this AM, 72 yesterday      Assessment/Plan:  89F with concern for pseudogout vs cellulitis.  CRP remains elevated.  Clinically improving at present on prednisone + IV ceftriaxone.  Difficult to differentiate cause.    No need for OR at present  Orthopedics will continue to follow while inpatient.     Hiren France MD  Orthopaedic Surgery PGY-4  Pager:  [phone]      "

## 2018-02-16 VITALS
WEIGHT: 146.2 LBS | RESPIRATION RATE: 16 BRPM | HEART RATE: 70 BPM | OXYGEN SATURATION: 97 % | DIASTOLIC BLOOD PRESSURE: 61 MMHG | HEIGHT: 65 IN | BODY MASS INDEX: 24.36 KG/M2 | SYSTOLIC BLOOD PRESSURE: 122 MMHG | TEMPERATURE: 95.7 F

## 2018-02-16 LAB — INR PPP: 2.21 (ref 0.86–1.14)

## 2018-02-16 PROCEDURE — 25000132 ZZH RX MED GY IP 250 OP 250 PS 637: Mod: GY | Performed by: INTERNAL MEDICINE

## 2018-02-16 PROCEDURE — 85610 PROTHROMBIN TIME: CPT | Performed by: INTERNAL MEDICINE

## 2018-02-16 PROCEDURE — 25000125 ZZHC RX 250: Performed by: INTERNAL MEDICINE

## 2018-02-16 PROCEDURE — A9270 NON-COVERED ITEM OR SERVICE: HCPCS | Mod: GY | Performed by: INTERNAL MEDICINE

## 2018-02-16 PROCEDURE — 27210995 ZZH RX 272: Performed by: INTERNAL MEDICINE

## 2018-02-16 PROCEDURE — 25000128 H RX IP 250 OP 636: Performed by: ORTHOPAEDIC SURGERY

## 2018-02-16 PROCEDURE — 25000128 H RX IP 250 OP 636: Performed by: INTERNAL MEDICINE

## 2018-02-16 PROCEDURE — 36415 COLL VENOUS BLD VENIPUNCTURE: CPT | Performed by: INTERNAL MEDICINE

## 2018-02-16 RX ORDER — CEPHALEXIN 500 MG/1
500 CAPSULE ORAL 2 TIMES DAILY
Qty: 20 CAPSULE | Refills: 0 | Status: SHIPPED | OUTPATIENT
Start: 2018-02-16 | End: 2018-04-11

## 2018-02-16 RX ORDER — METHYLPREDNISOLONE 4 MG
TABLET, DOSE PACK ORAL
Qty: 21 TABLET | Refills: 0 | Status: SHIPPED | OUTPATIENT
Start: 2018-02-16 | End: 2018-04-11

## 2018-02-16 RX ORDER — CEPHALEXIN 500 MG/1
500 CAPSULE ORAL EVERY 12 HOURS
Status: DISCONTINUED | OUTPATIENT
Start: 2018-02-16 | End: 2018-02-16 | Stop reason: HOSPADM

## 2018-02-16 RX ORDER — POTASSIUM CHLORIDE 750 MG/1
10 TABLET, EXTENDED RELEASE ORAL DAILY
Status: DISCONTINUED | OUTPATIENT
Start: 2018-02-16 | End: 2018-02-16 | Stop reason: HOSPADM

## 2018-02-16 RX ORDER — WARFARIN SODIUM 7.5 MG/1
7.5 TABLET ORAL
Status: DISCONTINUED | OUTPATIENT
Start: 2018-02-16 | End: 2018-02-16 | Stop reason: HOSPADM

## 2018-02-16 RX ORDER — FUROSEMIDE 40 MG
40 TABLET ORAL DAILY
Status: DISCONTINUED | OUTPATIENT
Start: 2018-02-16 | End: 2018-02-16 | Stop reason: HOSPADM

## 2018-02-16 RX ADMIN — SPIRONOLACTONE 50 MG: 25 TABLET ORAL at 09:00

## 2018-02-16 RX ADMIN — LEVOTHYROXINE SODIUM 125 MCG: 25 TABLET ORAL at 10:06

## 2018-02-16 RX ADMIN — CEPHALEXIN 500 MG: 500 CAPSULE ORAL at 09:07

## 2018-02-16 RX ADMIN — PREDNISONE 20 MG: 20 TABLET ORAL at 09:07

## 2018-02-16 RX ADMIN — RANITIDINE 150 MG: 150 TABLET ORAL at 08:59

## 2018-02-16 RX ADMIN — SODIUM CHLORIDE: 9 INJECTION, SOLUTION INTRAVENOUS at 00:34

## 2018-02-16 RX ADMIN — CEFTRIAXONE SODIUM 1 G: 10 INJECTION, POWDER, FOR SOLUTION INTRAVENOUS at 00:34

## 2018-02-16 RX ADMIN — POTASSIUM CHLORIDE 10 MEQ: 750 TABLET, FILM COATED, EXTENDED RELEASE ORAL at 09:07

## 2018-02-16 RX ADMIN — CARVEDILOL 25 MG: 25 TABLET, FILM COATED ORAL at 09:00

## 2018-02-16 RX ADMIN — FUROSEMIDE 40 MG: 40 TABLET ORAL at 09:07

## 2018-02-16 ASSESSMENT — ACTIVITIES OF DAILY LIVING (ADL)
ADLS_ACUITY_SCORE: 18

## 2018-02-16 NOTE — PLAN OF CARE
Problem: Patient Care Overview  Goal: Plan of Care/Patient Progress Review  Outcome: Improving  Patients right hand swollen and pink. Patient states redness and swelling improved. Radial pulse+. Pt able to wiggle fingers.  Pt c/o pain in 2 middle fingers of right hand. Pain medications offered to patient but patient declined. Transfers withstand by assist of 1/walker. Gait steady. Pt sitting in chair for meals. Call light within reach.

## 2018-02-16 NOTE — PLAN OF CARE
Problem: Patient Care Overview  Goal: Plan of Care/Patient Progress Review  Pt A/O X 4. VSS on room air. Lung sounds clear/equal bilaterally. IS encouraged.  PP2+ DP2+ RP 2+. CMS and Neuro's are intact. Denies numbness and tingling in all extremities. Denies nausea, shortness of breath, and chest pain. Has pain in the right hand and denying need for intervention, limb elevated on pillow, splint was placed this morning. Skin intact, right hand swollen and pink but improving. Voids spontaneously without difficulty in the bathroom. Patient ate box lunch for dinner, then was NPO at midnight and is now cleared for regular diet again. Bowel sounds audible and active in all four quadrants, is passing gas, last BM 2/12. Pt up standby assist with walker. PIV is patent in the right arm and NS infusing 75 mL/hr. PCD's on BLE's while in bed. Pt is able to make needs known and the call light is within the pt's reach. Continue to monitor. Hoping to discharge today.

## 2018-02-16 NOTE — PROGRESS NOTES
"Ortho Progress Note     Subjective:  Pain improved overnight.  Erythema much improved.  No systemic symptoms. No fevers. Happy with her improvement.    Objective:  /61 (BP Location: Right arm)  Pulse 70  Temp 95.7  F (35.4  C) (Oral)  Resp 16  Ht 1.651 m (5' 5\")  Wt 66.3 kg (146 lb 3.2 oz)  SpO2 97%  Breastfeeding? No  BMI 24.33 kg/m2  Gen: A&Ox3, no acute distress  CV: 2+ dp/pt pulses, capillary refill < 2sec  Resp: breathing equal and non-labored, no wheezing  MSK:     RUE       Nearly resolved erythema of dorsal index, long fingers and dorsal distal hand   Nearly normal motion of digits   Much less tender over digits, dorsal hand        SILT R/U/M       Motor:  Fires EPL, FPL, abductors       Perfusion:  Warm and well perfused in distal digits, palpable radial pulse     Hemoglobin   Date Value Ref Range Status   02/15/2018 9.0 (L) 11.7 - 15.7 g/dL Final   02/14/2018 9.6 (L) 11.7 - 15.7 g/dL Final       Assessment/Plan:  89F with concern for pseudogout vs cellulitis.  Clinically improving at present on prednisone + IV ceftriaxone.  Difficult to differentiate cause or which medication is allowing resolution.    Created splint this AM for hand - may be worn or removed PRN patient's comfort.  No need for OR  Ortho comfortable with discharge when felt appropriate by IM    Defer to internal medicine regarding discharge on Abx vs prednisone vs both.       Hiren France MD  Orthopaedic Surgery PGY-4  Pager:  [phone]      "

## 2018-02-16 NOTE — PROGRESS NOTES
Nutrition Brief Note    Received Patient/Family Request    Pt reports she has questions re pseudogout, reporting she has gout and is inquiring about which foods to avoid.     Provided handout on low purine diet nutrition therapy and discussed foods to avoid and eat more of in relation to a general healthy diet. Pt reports taking a multivitamin, vitamin D and iron daily. She inquired about taking vitamin C (her son said it would be helpful). Since pt is likely consuming adequate vitamin C via PO intakes and multivitamin, it is not currently recommended and being beneficial in this population, however if pt wishes to do so, it is not harmful.     Pt also mentioned she is taking warfarin and needs to avoid vitamin K foods. Reviewed good sources of vitamin K and trying to eat consistent amounts of vitamin K.     Pt asked appropriate questions and denied further assistance at this time. Encouraged pt to follow up with OP RD if any other dietary needs arise.    No nutrition follow-up warranted at this time.  Please consult if further needs arise.      Nieves Tovar RD, LD  Unit Pager: [phone]

## 2018-02-16 NOTE — PLAN OF CARE
Problem: Patient Care Overview  Goal: Plan of Care/Patient Progress Review  Right hand much improved with minimal discomfort per patient. Hand with minimal swelling and a little bit pink. Good appetite. Voiding in good amounts. Had BM today. Son here for discharge. DC instructions given to pt and son using teach back method. Verbalized understanding by response. Denied further questions. Discharged home at 1330.

## 2018-04-11 ENCOUNTER — HOSPITAL ENCOUNTER (INPATIENT)
Facility: CLINIC | Age: 83
LOS: 4 days | Discharge: NURSING, REHAB OR LONG-TERM CARE | DRG: 291 | End: 2018-04-16
Attending: EMERGENCY MEDICINE | Admitting: INTERNAL MEDICINE
Payer: MEDICARE

## 2018-04-11 ENCOUNTER — APPOINTMENT (OUTPATIENT)
Dept: GENERAL RADIOLOGY | Facility: CLINIC | Age: 83
DRG: 291 | End: 2018-04-11
Attending: EMERGENCY MEDICINE
Payer: MEDICARE

## 2018-04-11 DIAGNOSIS — I50.30 HEART FAILURE WITH PRESERVED EJECTION FRACTION (H): ICD-10-CM

## 2018-04-11 LAB
ALBUMIN SERPL-MCNC: 3.7 G/DL (ref 3.4–5)
ALP SERPL-CCNC: 93 U/L (ref 40–150)
ALT SERPL W P-5'-P-CCNC: 17 U/L (ref 0–50)
ANION GAP SERPL CALCULATED.3IONS-SCNC: 9 MMOL/L (ref 3–14)
AST SERPL W P-5'-P-CCNC: 13 U/L (ref 0–45)
BASOPHILS # BLD AUTO: 0 10E9/L (ref 0–0.2)
BASOPHILS NFR BLD AUTO: 0.5 %
BILIRUB SERPL-MCNC: 1 MG/DL (ref 0.2–1.3)
BUN SERPL-MCNC: 38 MG/DL (ref 7–30)
CALCIUM SERPL-MCNC: 9.4 MG/DL (ref 8.5–10.1)
CHLORIDE SERPL-SCNC: 107 MMOL/L (ref 94–109)
CO2 SERPL-SCNC: 29 MMOL/L (ref 20–32)
CREAT SERPL-MCNC: 1.42 MG/DL (ref 0.52–1.04)
DIFFERENTIAL METHOD BLD: ABNORMAL
EOSINOPHIL # BLD AUTO: 0.2 10E9/L (ref 0–0.7)
EOSINOPHIL NFR BLD AUTO: 5.8 %
ERYTHROCYTE [DISTWIDTH] IN BLOOD BY AUTOMATED COUNT: 16.3 % (ref 10–15)
GFR SERPL CREATININE-BSD FRML MDRD: 35 ML/MIN/1.7M2
GLUCOSE SERPL-MCNC: 123 MG/DL (ref 70–99)
HCT VFR BLD AUTO: 33.6 % (ref 35–47)
HGB BLD-MCNC: 10.2 G/DL (ref 11.7–15.7)
IMM GRANULOCYTES # BLD: 0 10E9/L (ref 0–0.4)
IMM GRANULOCYTES NFR BLD: 0.3 %
INR PPP: 2.92 (ref 0.86–1.14)
LYMPHOCYTES # BLD AUTO: 0.8 10E9/L (ref 0.8–5.3)
LYMPHOCYTES NFR BLD AUTO: 21.1 %
MCH RBC QN AUTO: 27.9 PG (ref 26.5–33)
MCHC RBC AUTO-ENTMCNC: 30.4 G/DL (ref 31.5–36.5)
MCV RBC AUTO: 92 FL (ref 78–100)
MONOCYTES # BLD AUTO: 0.3 10E9/L (ref 0–1.3)
MONOCYTES NFR BLD AUTO: 7.3 %
NEUTROPHILS # BLD AUTO: 2.6 10E9/L (ref 1.6–8.3)
NEUTROPHILS NFR BLD AUTO: 65 %
NRBC # BLD AUTO: 0 10*3/UL
NRBC BLD AUTO-RTO: 0 /100
NT-PROBNP SERPL-MCNC: ABNORMAL PG/ML (ref 0–1800)
PLATELET # BLD AUTO: 185 10E9/L (ref 150–450)
POTASSIUM SERPL-SCNC: 3.7 MMOL/L (ref 3.4–5.3)
PROT SERPL-MCNC: 7.1 G/DL (ref 6.8–8.8)
RBC # BLD AUTO: 3.65 10E12/L (ref 3.8–5.2)
SODIUM SERPL-SCNC: 145 MMOL/L (ref 133–144)
TROPONIN I SERPL-MCNC: 0.06 UG/L (ref 0–0.04)
WBC # BLD AUTO: 4 10E9/L (ref 4–11)

## 2018-04-11 PROCEDURE — 84484 ASSAY OF TROPONIN QUANT: CPT | Performed by: EMERGENCY MEDICINE

## 2018-04-11 PROCEDURE — 99285 EMERGENCY DEPT VISIT HI MDM: CPT | Mod: 25 | Performed by: EMERGENCY MEDICINE

## 2018-04-11 PROCEDURE — 25000128 H RX IP 250 OP 636: Performed by: EMERGENCY MEDICINE

## 2018-04-11 PROCEDURE — 85610 PROTHROMBIN TIME: CPT | Performed by: EMERGENCY MEDICINE

## 2018-04-11 PROCEDURE — 96374 THER/PROPH/DIAG INJ IV PUSH: CPT

## 2018-04-11 PROCEDURE — 93005 ELECTROCARDIOGRAM TRACING: CPT

## 2018-04-11 PROCEDURE — 93010 ELECTROCARDIOGRAM REPORT: CPT | Mod: Z6 | Performed by: EMERGENCY MEDICINE

## 2018-04-11 PROCEDURE — 80053 COMPREHEN METABOLIC PANEL: CPT | Performed by: EMERGENCY MEDICINE

## 2018-04-11 PROCEDURE — 85025 COMPLETE CBC W/AUTO DIFF WBC: CPT | Performed by: EMERGENCY MEDICINE

## 2018-04-11 PROCEDURE — 99285 EMERGENCY DEPT VISIT HI MDM: CPT | Mod: 25

## 2018-04-11 PROCEDURE — 71046 X-RAY EXAM CHEST 2 VIEWS: CPT

## 2018-04-11 PROCEDURE — 83880 ASSAY OF NATRIURETIC PEPTIDE: CPT | Performed by: EMERGENCY MEDICINE

## 2018-04-11 RX ORDER — FUROSEMIDE 10 MG/ML
40 INJECTION INTRAMUSCULAR; INTRAVENOUS ONCE
Status: COMPLETED | OUTPATIENT
Start: 2018-04-11 | End: 2018-04-11

## 2018-04-11 RX ADMIN — FUROSEMIDE 40 MG: 10 INJECTION, SOLUTION INTRAVENOUS at 20:19

## 2018-04-11 ASSESSMENT — ENCOUNTER SYMPTOMS
SHORTNESS OF BREATH: 1
FACIAL SWELLING: 0
ROS SKIN COMMENTS: POSITIVE FOR PRURITIS

## 2018-04-11 NOTE — IP AVS SNAPSHOT
MRN:[id number]                      After Visit Summary   4/11/2018    Jayne Nino    MRN: [id number]           Thank you!     Thank you for choosing [facility] for your care. Our goal is always to provide you with excellent care. Hearing back from our patients is one way we can continue to improve our services. Please take a few minutes to complete the written survey that you may receive in the mail after you visit with us. Thank you!        Patient Information     Date Of Birth          6/23/1927        Designated Caregiver       Most Recent Value    Caregiver    Will someone help with your care after discharge? yes    Name of designated caregiver son    Phone number of caregiver see in demographic/contacts in chart    Caregiver address same as patient      About your hospital stay     You were admitted on:  April 12, 2018 You last received care in the:  [address] [facility]    You were discharged on:  April 16, 2018        Reason for your hospital stay       You were admitted for a flare of your heart failure with fluid weight gain. Your breathing improved with doses of IV diuretics (water pills). You were weaned off of oxygen and symptomatically felt much better. You will continue to rehabilitate at the TCU upon discharge.                  Who to Call     For medical emergencies, please call 911.  For non-urgent questions about your medical care, please call your primary care provider or clinic, [phone]          Attending Provider     Provider Specialty    Raymond Dc MD Emergency Medicine    Sena Jay MD Cardiology       Primary Care Provider Office Phone # Fax #    Dinora Jimenez [phone]      After Care Instructions     Activity - Up ad cleo           Advance Diet as Tolerated       Follow this diet upon discharge: Orders Placed This Encounter      Fluid restriction 2000 ML FLUID      2 Gram Sodium Diet            Daily weights       Call  "Provider for weight gain of more than 2 pounds per day or 5 pounds per week.            General info for SNF       Length of Stay Estimate: Short Term Care: Estimated # of Days <30  Condition at Discharge: Improving  Level of care:skilled   Rehabilitation Potential: Good  Admission H&P remains valid and up-to-date: Yes  Recent Chemotherapy: N/A  Use Nursing Home Standing Orders: Yes            Mantoux instructions       Give two-step Mantoux (PPD) Per Facility Policy Yes                  Follow-up Appointments     Follow Up and recommended labs and tests       Follow up with [facility].  The following labs/tests are recommended: BMP and INR.                  Additional Services     Occupational Therapy Adult Consult       Evaluate and treat as clinically indicated.    Reason:  Deconditioning, inability to perform ADLs            Physical Therapy Adult Consult       Evaluate and treat as clinically indicated.    Reason:  Deconditioning                  Pending Results     No orders found from 4/9/2018 to 4/12/2018.            Statement of Approval     Ordered          04/16/18 0837  I have reviewed and agree with all the recommendations and orders detailed in this document.  EFFECTIVE NOW     Approved and electronically signed by:  Malcolm Covarrubias MD           04/15/18 0742  I have reviewed and agree with all the recommendations and orders detailed in this document.  EFFECTIVE NOW     Approved and electronically signed by:  Malcolm Covarrubias MD             Admission Information     Date & Time Provider Department Dept. Phone    4/11/2018 Sena Jay MD [address] [facility] [phone]      Your Vitals Were     Blood Pressure Pulse Temperature Respirations Height Weight    100/47 (BP Location: Right arm) 79 98  F (36.7  C) (Oral) 14 1.651 m (5' 5\") 70.4 kg (155 lb 1.6 oz)    Pulse Oximetry BMI (Body Mass Index)                96% 25.81 kg/m2          MyChart Information     MyChart " "lets you send messages to your doctor, view your test results, renew your prescriptions, schedule appointments and more. To sign up, go to [email or web address] . Click on \"Log in\" on the left side of the screen, which will take you to the Welcome page. Then click on \"Sign up Now\" on the right side of the page.     You will be asked to enter the access code listed below, as well as some personal information. Please follow the directions to create your username and password.     Your access code is: [email or web address]  Expires: 5/15/2018 10:28 AM     Your access code will  in 90 days. If you need help or a new code, please call your [facility] clinic or [phone].        Care EveryWhere ID     This is your Care EveryWhere ID. This could be used by other organizations to access your [facility] medical records  [id number]        Equal Access to Services     ARPAN CROWLEY : Mino Ko, teodoro dixon, mango hays, everett larsen . So [facility] [phone].    ATENCIÓN: Si habla español, tiene a canada disposición servicios gratuitos de asistencia lingüística. Llame al [phone].    We comply with applicable federal civil rights laws and [place] laws. We do not discriminate on the basis of race, color, national origin, age, disability, sex, sexual orientation, or gender identity.               Review of your medicines      START taking        Dose / Directions    * bumetanide 2 MG tablet   Commonly known as:  BUMEX   Used for:  Heart failure with preserved ejection fraction (H)        Dose:  2 mg   Take 1 tablet (2 mg) by mouth daily   Quantity:  60 tablet   Refills:  0       * bumetanide 1 MG tablet   Commonly known as:  BUMEX   Used for:  Heart failure with preserved ejection fraction (H)        Dose:  2 mg   Take 2 tablets (2 mg) by mouth daily as needed For 5 lb weight gain   Quantity:  180 tablet   Refills:  1       metolazone 2.5 MG tablet   Commonly known " as:  ZAROXOLYN   Used for:  Heart failure with preserved ejection fraction (H)        Dose:  2.5 mg   Take 1 tablet (2.5 mg) by mouth daily as needed For 10 lb weight gain   Quantity:  90 tablet   Refills:  1       * Notice:  This list has 2 medication(s) that are the same as other medications prescribed for you. Read the directions carefully, and ask your doctor or other care provider to review them with you.      CONTINUE these medicines which have NOT CHANGED        Dose / Directions    acetaminophen 325 MG tablet   Commonly known as:  TYLENOL        Dose:  325-650 mg   Take 325-650 mg by mouth every 4 hours as needed   Quantity:  250 tablet   Refills:  0       ALLOPURINOL PO        Dose:  100 mg   Take 100 mg by mouth daily   Refills:  0       atorvastatin 80 MG tablet   Commonly known as:  LIPITOR        Dose:  80 mg   Take 80 mg by mouth At Bedtime   Refills:  0       COREG 25 MG tablet   Generic drug:  carvedilol        Dose:  25 mg   Take 25 mg by mouth 2 times daily   Refills:  0       DIGOXIN PO        Dose:  125 mcg   Take 125 mcg by mouth every 48 hours   Refills:  0       famotidine 20 MG tablet   Commonly known as:  PEPCID        Dose:  20 mg   Take 20 mg by mouth daily   Refills:  0       ferrous sulfate 140 (45 Fe) MG Tbcr CR tablet        Dose:  140 mg   Take 140 mg by mouth daily   Refills:  0       levothyroxine 125 MCG tablet   Commonly known as:  SYNTHROID/LEVOTHROID   Used for:  Bradycardia        Dose:  125 mcg   Take 1 tablet by mouth daily.   Quantity:  90 tablet   Refills:  1       MULTIVITAMIN ADULT PO        Dose:  1 tablet   Take 1 tablet by mouth daily   Refills:  0       polyethylene glycol 0.4%- propylene glycol 0.3% 0.4-0.3 % Soln ophthalmic solution   Commonly known as:  SYSTANE ULTRA        Dose:  1 drop   Place 1 drop into both eyes every hour as needed for dry eyes   Refills:  0       VITAMIN D (CHOLECALCIFEROL) PO        Dose:  1 tablet   Take 1 tablet by mouth daily   Refills:   0       warfarin 5 MG tablet   Commonly known as:  COUMADIN   Indication:  atrial fibrillation        Dose:  2.5-5 mg   Take 2.5-5 mg by mouth daily Take 2.5 mg on Tuesday and Sundays, and 5 mg ROW   Refills:  0         STOP taking     FUROSEMIDE PO                Where to get your medicines      These medications were sent to [facility] - [place], [place] - [address]  [address], [facility] [address]     Phone:  [phone]     bumetanide 1 MG tablet    metolazone 2.5 MG tablet         Some of these will need a paper prescription and others can be bought over the counter. Ask your nurse if you have questions.     You don't need a prescription for these medications     bumetanide 2 MG tablet                Protect others around you: Learn how to safely use, store and throw away your medicines at www.disposemymeds.org.             Medication List: This is a list of all your medications and when to take them. Check marks below indicate your daily home schedule. Keep this list as a reference.      Medications           Morning Afternoon Evening Bedtime As Needed    acetaminophen 325 MG tablet   Commonly known as:  TYLENOL   Take 325-650 mg by mouth every 4 hours as needed   Last time this was given:  650 mg on 4/15/2018 10:44 PM                                ALLOPURINOL PO   Take 100 mg by mouth daily                                atorvastatin 80 MG tablet   Commonly known as:  LIPITOR   Take 80 mg by mouth At Bedtime   Last time this was given:  80 mg on 4/15/2018  8:42 PM                                * bumetanide 2 MG tablet   Commonly known as:  BUMEX   Take 1 tablet (2 mg) by mouth daily   Last time this was given:  2 mg on 4/16/2018  8:01 AM                                * bumetanide 1 MG tablet   Commonly known as:  BUMEX   Take 2 tablets (2 mg) by mouth daily as needed For 5 lb weight gain   Last time this was given:  2 mg on 4/16/2018  8:01 AM                                 COREG 25 MG tablet   Take 25 mg by mouth 2 times daily   Last time this was given:  25 mg on 4/16/2018  8:01 AM   Generic drug:  carvedilol                                DIGOXIN PO   Take 125 mcg by mouth every 48 hours   Last time this was given:  125 mcg on 4/15/2018  8:31 AM                                famotidine 20 MG tablet   Commonly known as:  PEPCID   Take 20 mg by mouth daily   Last time this was given:  20 mg on 4/16/2018  8:01 AM                                ferrous sulfate 140 (45 Fe) MG Tbcr CR tablet   Take 140 mg by mouth daily                                levothyroxine 125 MCG tablet   Commonly known as:  SYNTHROID/LEVOTHROID   Take 1 tablet by mouth daily.   Last time this was given:  125 mcg on 4/16/2018  8:01 AM                                metolazone 2.5 MG tablet   Commonly known as:  ZAROXOLYN   Take 1 tablet (2.5 mg) by mouth daily as needed For 10 lb weight gain                                MULTIVITAMIN ADULT PO   Take 1 tablet by mouth daily                                polyethylene glycol 0.4%- propylene glycol 0.3% 0.4-0.3 % Soln ophthalmic solution   Commonly known as:  SYSTANE ULTRA   Place 1 drop into both eyes every hour as needed for dry eyes                                VITAMIN D (CHOLECALCIFEROL) PO   Take 1 tablet by mouth daily                                warfarin 5 MG tablet   Commonly known as:  COUMADIN   Take 2.5-5 mg by mouth daily Take 2.5 mg on Tuesday and Sundays, and 5 mg ROW   Last time this was given:  3 mg on 4/15/2018  6:47 PM                                * Notice:  This list has 2 medication(s) that are the same as other medications prescribed for you. Read the directions carefully, and ask your doctor or other care provider to review them with you.

## 2018-04-11 NOTE — ED PROVIDER NOTES
"    [facility] EMERGENCY DEPARTMENT ([facility])    4/11/18       History     Chief Complaint   Patient presents with     Shortness of Breath     \"it gradually crept up on me\"     Pruritis     Leg Swelling     HPI  Jayne Nino is a 89 year old female with a medical history significant for atrial fibrillation currently on Coumadin, aortic valve replacement, hypertension, HFpEF, hypothyroidism and gout who presents to the Emergency Department for evaluation of pruritis, shortness of breath and bilateral leg swelling.  The patient reports that 2 weeks ago she began having generalized itching across her whole body.  She states that it is most severe on her back, but she does have itching on her neck, chest, bilateral arms and now her bilateral legs.  She states that the itching is worse in the morning.  She does note that she has seen her PCP and was prescribed a cream for the itching, which has significantly helped.  The patient believed her itching was related to starting allopurinol, and believes that she is having a allergic reaction to this. The patient reports that she tried stopping it for 4 days; however, she did not see any improvement of her symptoms, so she restarted the medication.The patient denies any rash, swelling of the lips or swelling of the tongue.    The patient also states that over the last week she has slowly developed worsening shortness of breath.  She states that over the past month she has also developed worsening bilateral leg swelling.     The patient called her nurse today and told her about the symptoms, she was advised to come in and be evaluated.    She also denies any chest pain.  She states that her shortness of breath is worse at night when she is attempting to lay down, which is new for her.  The patient reports that she occasionally will have leg swelling, so she is on Lasix; however, she has not had it this severe in the past or last this long.  She also notes that " she has never had shortness of breath with her leg swelling in the past.  She denies any recent missed doses of her Lasix or recent changes in the dose.  She denies any changes to her diet.    I have reviewed the Medications, Allergies, Past Medical and Surgical History, and Social History in the [employer] system.    Past Medical History:   Diagnosis Date     A-fib (H)      Breast cancer (H)      Gout      Heart disease      High cholesterol      Hypothyroid        Past Surgical History:   Procedure Laterality Date     ORTHOPEDIC SURGERY       REPLACE VALVE AORTIC         Family History   Problem Relation Age of Onset     Coronary Artery Disease Mother      Coronary Artery Disease Father      CEREBROVASCULAR DISEASE Mother      Breast Cancer No family hx of      Colon Cancer No family hx of      Other Cancer No family hx of        Social History   Substance Use Topics     Smoking status: Never Smoker     Smokeless tobacco: Never Used     Alcohol use No       No current facility-administered medications for this encounter.      Current Outpatient Prescriptions   Medication     furosemide (LASIX) 40 MG tablet     warfarin (COUMADIN) 5 MG tablet     carvedilol (COREG) 25 MG tablet     atorvastatin (LIPITOR) 80 MG tablet     spironolactone (ALDACTONE) 50 MG tablet     ferrous sulfate 140 (45 FE) MG TBCR     polyethylene glycol 0.4%- propylene glycol 0.3% (SYSTANE ULTRA) 0.4-0.3 % SOLN ophthalmic solution     famotidine (PEPCID) 20 MG tablet     acetaminophen (TYLENOL) 325 MG tablet     levothyroxine (SYNTHROID, LEVOTHROID) 125 MCG tablet        Allergies   Allergen Reactions     Oxycodone      Pt states she takes it for back pain, only causes itching         Review of Systems   HENT: Negative for facial swelling.    Respiratory: Positive for shortness of breath.    Cardiovascular: Positive for leg swelling (bilateral). Negative for chest pain.   Skin: Negative for rash.        Positive for pruritis   All other systems  reviewed and are negative.      Physical Exam   BP: 137/65  Heart Rate: 98  Temp: 97.6  F (36.4  C)  Resp: 28  SpO2: 97 %      Physical Exam   General: awake, alert, NAD  Head: normal cephalic  HEENT: pupils equal, conjugate gaze in tact  Neck: Supple  CV: Systolic murmur  Lungs: Crackles heard in bilateral lungs.  No increased work of breathing until patient starts talking in long sentences and patient becomes to get  Abd: soft, non-tender, no guarding, no peritoneal signs  EXT: Patient has bilateral lower extremity edema to the knee.  Skin: Excoriations noted on back and arms bilaterally  Neuro: awake, answers questions appropriately. No focal deficits noted         ED Course   5:59 PM  The patient was seen and examined by Raymond Dc MD in Room ED08.     ED Course     Medications   furosemide (LASIX) injection 40 mg (40 mg Intravenous Given 4/11/18 2019)       Results for orders placed or performed during the hospital encounter of 04/11/18 (from the past 24 hour(s))   CBC with platelets differential   Result Value Ref Range    WBC 4.0 4.0 - 11.0 10e9/L    RBC Count 3.65 (L) 3.8 - 5.2 10e12/L    Hemoglobin 10.2 (L) 11.7 - 15.7 g/dL    Hematocrit 33.6 (L) 35.0 - 47.0 %    MCV 92 78 - 100 fl    MCH 27.9 26.5 - 33.0 pg    MCHC 30.4 (L) 31.5 - 36.5 g/dL    RDW 16.3 (H) 10.0 - 15.0 %    Platelet Count 185 150 - 450 10e9/L    Diff Method Automated Method     % Neutrophils 65.0 %    % Lymphocytes 21.1 %    % Monocytes 7.3 %    % Eosinophils 5.8 %    % Basophils 0.5 %    % Immature Granulocytes 0.3 %    Nucleated RBCs 0 0 /100    Absolute Neutrophil 2.6 1.6 - 8.3 10e9/L    Absolute Lymphocytes 0.8 0.8 - 5.3 10e9/L    Absolute Monocytes 0.3 0.0 - 1.3 10e9/L    Absolute Eosinophils 0.2 0.0 - 0.7 10e9/L    Absolute Basophils 0.0 0.0 - 0.2 10e9/L    Abs Immature Granulocytes 0.0 0 - 0.4 10e9/L    Absolute Nucleated RBC 0.0    INR   Result Value Ref Range    INR 2.92 (H) 0.86 - 1.14   Troponin I   Result Value Ref Range     Troponin I ES 0.061 (H) 0.000 - 0.045 ug/L   Comprehensive metabolic panel   Result Value Ref Range    Sodium 145 (H) 133 - 144 mmol/L    Potassium 3.7 3.4 - 5.3 mmol/L    Chloride 107 94 - 109 mmol/L    Carbon Dioxide 29 20 - 32 mmol/L    Anion Gap 9 3 - 14 mmol/L    Glucose 123 (H) 70 - 99 mg/dL    Urea Nitrogen 38 (H) 7 - 30 mg/dL    Creatinine 1.42 (H) 0.52 - 1.04 mg/dL    GFR Estimate 35 (L) >60 mL/min/1.7m2    GFR Estimate If Black 42 (L) >60 mL/min/1.7m2    Calcium 9.4 8.5 - 10.1 mg/dL    Bilirubin Total 1.0 0.2 - 1.3 mg/dL    Albumin 3.7 3.4 - 5.0 g/dL    Protein Total 7.1 6.8 - 8.8 g/dL    Alkaline Phosphatase 93 40 - 150 U/L    ALT 17 0 - 50 U/L    AST 13 0 - 45 U/L   Nt probnp inpatient (BNP)   Result Value Ref Range    N-Terminal Pro BNP Inpatient 80347 (H) 0 - 1800 pg/mL   XR Chest 2 Views    Narrative    CHEST TWO VIEW 4/11/2018 7:02 PM     HISTORY: Shortness of breath, crackles.      COMPARISON: Chest x-ray 11/5/2016.      Impression    IMPRESSION: Two views of the chest are performed. Heart size is within  normal limits. Median sternotomy changes are noted. Patient is status  post aortic valve replacement. Mild interstitial lung changes are  present with probable atelectasis at the left lung base which is  stable. No evidence of pneumothorax or significant pleural effusions.    SATYA SAHNI MD       Procedures             EKG Interpretation:      Interpreted by Raymond Dc  Time reviewed: 1746  Symptoms at time of EKG: None   Rhythm: atrial fibrillation - controlled  Rate: Normal  Axis: Normal  Ectopy: none  Conduction: right bundle branch block (complete)  ST Segments/ T Waves: No acute ischemic changes  Q Waves: none  Comparison to prior: Unchanged    Clinical Impression: no acute changes           Assessments & Plan (with Medical Decision Making)   Jayne is a 89-year-old female who presents with 2 complaints one is diffuse itching ongoing for 2 weeks and one is worsening shortness of breath  with lower extremity edema.    Patient has stable vital signs but does seem to get slightly tachypneic with conversation.  She also endorses PND, she is crackles in her bilateral lower lung fields, and significant lower extremity edema I suspect patient has fluid overload.  Differential for this could be new onset ACS, CHF exacerbation, other differential could include pneumonia or other acute lung pathology.    Regarding her diffuse itching patient does not have any evidence of rash though she does have excoriations all over her body will consider elevated bilirubin or cholestasis as a potential cause of her itching.  Will add CMP to her evaluation.    EKG appears unchanged from baseline.  Patient does have an elevated troponin though this is been elevated in the past this is actually lower than she has had in the past.  Elevated creatinine but appears to be at her baseline.  Normal white count no left shift.    Attempted to ambulate the patient around the emergency department she desatted to 87% with ambulation became quite symptomatic at this point patient is going to require admission for diuresis.  She was given 40 of IV Lasix here in the ER will be admitted to the cardiology service under Dr. Jay.     I have reviewed the nursing notes.    I have reviewed the findings, diagnosis, plan and need for follow up with the patient.    New Prescriptions    No medications on file       Final diagnoses:   Heart failure with preserved ejection fraction (H)     I, Kip Green, am serving as a trained medical scribe to document services personally performed by Raymond Dc MD, based on the provider's statements to me.   I, Raymond Dc MD, was physically present and have reviewed and verified the accuracy of this note documented by Kip Green.    4/11/2018   [facility], EMERGENCY DEPARTMENT     Raymond Dc MD  04/11/18 2052

## 2018-04-11 NOTE — IP AVS SNAPSHOT
[address] [address]    Phone:  [phone]                                       After Visit Summary   4/11/2018    Jayne Nino    MRN: [id number]           After Visit Summary Signature Page     I have received my discharge instructions, and my questions have been answered. I have discussed any challenges I see with this plan with the nurse or doctor.    ..........................................................................................................................................  Patient/Patient Representative Signature      ..........................................................................................................................................  Patient Representative Print Name and Relationship to Patient    ..................................................               ................................................  Date                                            Time    ..........................................................................................................................................  Reviewed by Signature/Title    ...................................................              ..............................................  Date                                                            Time

## 2018-04-11 NOTE — IP AVS SNAPSHOT
` `     UNIT [address] [facility] BANK: [phone]            Medication Administration Report for Jayne Nino as of 04/16/18 0957   Legend:    Given Hold Not Given Due Canceled Entry Other Actions    Time Time (Time) Time  Time-Action       Inactive    Active    Linked        Medications 04/10/18 04/11/18 04/12/18 04/13/18 04/14/18 04/15/18 04/16/18    ACE Inhibitor/ARB/ARNI not indicated according to guidelines  Freq: DOES NOT GO TO MAR Route: XX  PRN Reason: other  Start: 04/12/18 0138   Admin Instructions: ACE Inhibitor/ARB/ARNI not indicated according to guidelines  No MI, Ejection Fraction greater than 40%    Dispense Loc: [facility]               acetaminophen (TYLENOL) tablet 325-650 mg  Dose: 325-650 mg  Freq: EVERY 4 HOURS PRN Route: PO  PRN Reason: mild pain  Start: 04/12/18 0041   Admin Instructions: Maximum acetaminophen dose from all sources = 75 mg/kg/day not to exceed 4 grams/day.    Admin. Amount: 1-2 tablet (1-2 × 325 mg tablet)  Last Admin: 04/15/18 2244  Dispense Loc: [facility] ADS 6C1        2030 (650 mg)-Given        0315 (650 mg)-Given       1354 (650 mg)-Given       2100 (650 mg)-Given        0313 (650 mg)-Given       0830 (650 mg)-Given       1752 (650 mg)-Given       2244 (650 mg)-Given            atorvastatin (LIPITOR) tablet 80 mg  Dose: 80 mg  Freq: AT BEDTIME Route: PO  Start: 04/12/18 0148   Admin. Amount: 1 tablet (1 × 80 mg tablet)  Last Admin: 04/15/18 2042  Dispense Loc: [facility] ADS 6C1              2154 (80 mg)-Given        2139 (80 mg)-Given        2100 (80 mg)-Given        2042 (80 mg)-Given               [ ] 2200           bumetanide (BUMEX) tablet 2 mg  Dose: 2 mg  Freq: 2 TIMES DAILY. Route: PO  Start: 04/14/18 1600   Admin. Amount: 1 tablet (1 × 2 mg tablet)  Last Admin: 04/16/18 0801  Dispense Loc: [facility]S 6C[facility]         1708 (2 mg)-Given        0831 (2 mg)-Given       1754 (2 mg)-Given        0801 (2 mg)-Given       [ ] 1600           carvedilol (COREG) tablet  25 mg  Dose: 25 mg  Freq: 2 TIMES DAILY Route: PO  Start: 04/12/18 0800   Admin. Amount: 1 tablet (1 × 25 mg tablet)  Last Admin: 04/16/18 0801  Dispense Loc: [facility]S 6C[facility]       0817 (25 mg)-Given       1907 (25 mg)-Given        0916 (25 mg)-Given       2030 (25 mg)-Given        0851 (25 mg)-Given       2100 (25 mg)-Given        0831 (25 mg)-Given       2042 (25 mg)-Given        0801 (25 mg)-Given       [ ] 2000           Continuing beta blocker from home medication list OR beta blocker order already placed during this visit  Freq: DOES NOT GO TO MAR Route: XX  PRN Reason: other  Start: 04/12/18 0138   Admin Instructions: Continuing beta blocker from home medication list OR beta blocker order already placed during this visit    Dispense Loc: [facility]               digoxin (LANOXIN) tablet 125 mcg  Dose: 125 mcg  Freq: EVERY 48 HOURS Route: PO  Start: 04/13/18 0800   Admin. Amount: 1 tablet (1 × 125 mcg tablet)  Last Admin: 04/15/18 0831  Dispense Loc: [facility] 6C[facility]        0916 (125 mcg)-Given         0831 (125 mcg)-Given            famotidine (PEPCID) tablet 20 mg  Dose: 20 mg  Freq: DAILY Route: PO  Start: 04/13/18 0800   Admin Instructions: Dose adjusted per renal dosing policy.  Estimated CrCl = ~28 mL/min.    Admin. Amount: 1 tablet (1 × 20 mg tablet)  Last Admin: 04/16/18 0801  Dispense Loc: [facility] 6C[facility]        0915 (20 mg)-Given        0851 (20 mg)-Given        0831 (20 mg)-Given        0801 (20 mg)-Given           ferrous sulfate (IRON) tablet 325 mg  Dose: 325 mg  Freq: DAILY Route: PO  Start: 04/12/18 0800   Admin Instructions: DO NOT CRUSH. Absorbed best on an empty stomach.  If stomach upset occurs, can take with meals.    Admin. Amount: 1 tablet (1 × 325 mg tablet)  Last Admin: 04/16/18 0801  Dispense Loc: [facility] 6C[facility]       0817 (325 mg)-Given        0915 (325 mg)-Given        0851 (325 mg)-Given        0831 (325 mg)-Given        0801 (325 mg)-Given           HOLD nitroGLYcerin  IF  Freq: HOLD Route: XX  Start: 04/12/18 0133   Admin Instructions: HOLD nitroGLYcerin IF patient has received avanafil (STENDRA), sildenafil (VIAGRA/REVATIO) within the last 8 hours, vardenafil (LEVITRA/STAXYN) within the last 18 hours or tadalafil (CIALIS/ADCIRCA) within the last 36 hours.    Order specific questions:  Medication(s) to hold: Nitroglycerin IF   Parameter for hold (doses,days,conditions) : Other (see admin instructions)  Hours or days to hold med before/after procedure/surgery 36     Dispense Loc: [facility]               levothyroxine (SYNTHROID/LEVOTHROID) tablet 125 mcg  Dose: 125 mcg  Freq: DAILY Route: PO  Start: 04/12/18 0800   Admin Instructions: Separate oral administration of iron- or calcium-containing products and levothyroxine by at least 4 hours.    Admin. Amount: 1 tablet (1 × 25 mcg tablet) + 1 tablet (1 × 100 mcg tablet)  Last Admin: 04/16/18 0801  Dispense Loc: [facility] 6C[facility]   Mixture Administration Information:   Medication Type Amount   levothyroxine 25 MCG TABS Medications 25 mcg   levothyroxine 100 MCG TABS Medications 100 mcg               0817 (125 mcg)-Given        0915 (125 mcg)-Given        0851 (125 mcg)-Given        0831 (125 mcg)-Given        0801 (125 mcg)-Given           lidocaine (XYLOCAINE) 2 % topical gel  Freq: EVERY 4 HOURS PRN Route: Top  PRN Reason: moderate pain  Start: 04/14/18 1630   Admin Instructions: Apply to knee    Dispense Loc: [facility]  Volume: 10 mL               magnesium sulfate 2 g in NS intermittent infusion ([facility] or [facility])  Dose: 2 g  Freq: DAILY PRN Route: IV  PRN Reason: magnesium supplementation  Start: 04/12/18 0139   Admin Instructions: For Serum Mg++ 1.6 - 2 mg/dL  Give 2 g and recheck magnesium level next AM.    Admin. Amount: 2 g = 50 mL Conc: 2 g/50 mL  Last Admin: 04/14/18 0854  Dispense Loc: [facility]  Infused Over: 60 Minutes  Volume: 50 mL         0854 (2 g)-New Bag [C]             magnesium sulfate  4 g in 100 mL sterile water (premade)  Dose: 4 g  Freq: EVERY 4 HOURS PRN Route: IV  PRN Reason: magnesium supplementation  Start: 04/12/18 0139   Admin Instructions: For serum Mg++ less than 1.6 mg/dL  Give 4 g and recheck magnesium level 2 hours after dose, and next AM.    Admin. Amount: 4 g = 100 mL Conc: 4 g/100 mL  Dispense Loc: [facility] 6C[facility]  Infused Over: 120 Minutes  Volume: 100 mL               Patient is already receiving anticoagulation with heparin, enoxaparin (LOVENOX), warfarin (COUMADIN)  or other anticoagulant medication  Freq: CONTINUOUS PRN Route: XX  Start: 04/12/18 0138   Dispense Loc: [facility]               potassium chloride (KLOR-CON) Packet 20-40 mEq  Dose: 20-40 mEq  Freq: EVERY 2 HOURS PRN Route: ORAL OR FEED  PRN Reason: potassium supplementation  Start: 04/12/18 0139   Admin Instructions: Use if unable to tolerate tablets.    If Serum K+ 3.4-4.0, dose = 20 mEq x1. Recheck K+ level the next AM.  If Serum K+ 3.0-3.3, dose = 60 mEq po total dose (40 mEq x 1 followed in 2 hours by 20 mEq X1). Recheck K+ level 4 hours after dose and the next AM.  If Serum K+ 2.5-2.9, dose = 80 mEq po total dose (40 mEq Q2H x2). Recheck K+ level 4 hours after dose and the next AM.  If Serum K+ less than 2.5, See IV order.  Dissolve packet contents in 4-8 ounces of cold water or juice.    Admin. Amount: 20-40 mEq  Last Admin: 04/12/18 0422  Dispense Loc: [facility] 6C[facility]       0422 (20 mEq)-Given               potassium chloride 10 mEq in 100 mL intermittent infusion with 10 mg lidocaine  Dose: 10 mEq  Freq: EVERY 1 HOUR PRN Route: IV  PRN Reason: potassium supplementation  Start: 04/12/18 0139   Admin Instructions: Infuse via PERIPHERAL LINE. Use potassium with lidocaine for pain with peripheral administration.  If Serum K+ 3.4-4.0, dose = 10 mEq/hr x2 doses. Recheck K+ level the next AM.  If Serum K+ 3.0-3.3, dose = 10 mEq/hr x4 doses (40 mEq IV total dose). Recheck K+ level 2 hours after dose  and the next AM.  If Serum K+ less than 3.0, dose = 10 mEq/hr x6 doses (60 mEq IV total dose). Recheck K+ level 2 hours after dose and the next AM.    Admin. Amount: 10 mEq = 100 mL Conc: 10 mEq/100 mL  Dispense Loc: [facility]  Infused Over: 1 Hours  Volume: 100 mL               potassium chloride 10 mEq in 100 mL sterile water intermittent infusion (premix)  Dose: 10 mEq  Freq: EVERY 1 HOUR PRN Route: IV  PRN Reason: potassium supplementation  Start: 04/12/18 0139   Admin Instructions: Infuse via PERIPHERAL LINE or CENTRAL LINE. Use for central line replacement if patient weight less than 65 kg, if patient is on TPN with high potassium content or if unit does not stock 20 mEq bags.  If Serum K+ 3.4-4.0, dose = 10 mEq/hr x2 doses. Recheck K+ level the next AM.  If Serum K+ 3.0-3.3, dose = 10 mEq/hr x4 doses (40 mEq IV total dose). Recheck K+ level 2 hours after dose and the next AM.  If Serum K+ less than 3.0, dose = 10 mEq/hr x6 doses (60 mEq IV total dose). Recheck K+ level 2 hours after dose and the next AM.    Admin. Amount: 10 mEq = 100 mL Conc: 10 mEq/100 mL  Dispense Loc: [facility]  Infused Over: 60 Minutes  Volume: 100 mL               potassium chloride 20 mEq in 50 mL intermittent infusion  Dose: 20 mEq  Freq: EVERY 1 HOUR PRN Route: IV  PRN Reason: potassium supplementation  Start: 04/12/18 0139   Admin Instructions: Infuse via CENTRAL LINE Only.  May need EKG if less than 65 kg or on TPN - Max rate is 0.3 mEq/kg/hr for patients not on EKG monitoring.    If Serum K+ 3.4-4.0, dose = 20 mEq/hr x1 doses. Recheck K+ level the next AM.  If Serum K+ 3.0-3.3, dose = 20 mEq/hr x2 doses (40 mEq IV total dose).  Recheck K+ level 2 hours after dose and the next AM.  If Serum K+ less than 3.0, dose = 20 mEq/hr x3 doses (60 mEq IV total dose). Recheck K+ level 2 hours after dose and the next AM.    Admin. Amount: 20 mEq = 50 mL Conc: 20 mEq/50 mL  Dispense Loc: [facility]  Volume: 50  mL               potassium chloride SA (K-DUR/KLOR-CON M) CR tablet 20-40 mEq  Dose: 20-40 mEq  Freq: EVERY 2 HOURS PRN Route: PO  PRN Reason: potassium supplementation  Start: 18   Admin Instructions: Use if able to take PO.   If Serum K+ 3.4-4.0, dose = 20 mEq x1. Recheck K+ level the next AM.  If Serum K+ 3.0-3.3, dose = 60 mEq po total dose (40 mEq x1 followed in 2 hours by 20 mEq x1). Recheck K+ level 4 hours after dose and the next AM.  If Serum K+ 2.5-2.9, dose = 80 mEq po total dose (40 mEq Q2H x2). Recheck K+ level 4 hours after dose and the next AM.  If Serum K+ less than 2.5, See IV order.  DO NOT CRUSH.    Admin. Amount: 2-4 tablet (2-4 × 10 mEq tablet)  Last Admin: 04/15/18 0831  Dispense Loc: [facility] 6C[facility]       2224 (20 mEq)-Given        0918 (20 mEq)-Given [C]        0704 (40 mEq)-Given [C]       0852 (20 mEq)-Given [C]        0831 (20 mEq)-Given [C]            Warfarin Therapy Reminder (Check START DATE - warfarin may be starting in the FUTURE)  Dose: 1 each  Freq: CONTINUOUS PRN Route: XX  Start: 18   Admin Instructions: *Note to reorder warfarin daily*  Pharmacy Warfarin Dosing Service  Patient is on Warfarin Therapy - check for daily order    Admin. Amount: 1 each  Dispense Loc: [facility]              Future Medications  Medications 04/10/18 04/11/18 04/12/18 04/13/18 04/14/18 04/15/18 04/16/18       warfarin (COUMADIN) tablet 3 mg  Dose: 3 mg  Freq: ONCE AT 6PM Route: PO  Start: 18 1800   Admin. Amount: 1 tablet (1 × 3 mg tablet)  Dispense Loc: [facility] 6C[facility]  Administrations Remainin           [ ] 1800          Completed Medications  Medications 04/10/18 04/11/18 04/12/18 04/13/18 04/14/18 04/15/18 04/16/18         Dose: 3 mg  Freq: ONCE AT 6PM Route: PO  Start: 04/15/18 1800   End: 04/15/18 1847   Admin. Amount: 1 tablet (1 × 3 mg tablet)  Last Admin: 04/15/18 1847  Dispense Loc: [facility] 6C[facility]  Administrations Remainin           (3  mg)-Given              Dose: 4 mg  Freq: ONCE AT 6PM Route: PO  Start: 18 1800   End: 18   Admin. Amount: 1 tablet (1 × 4 mg tablet)  Last Admin: 18  Dispense Loc: [facility] 6C[facility]  Administrations Remainin         1708 (4 mg)-Given               Dose: 5 mg  Freq: ONCE AT 6PM Route: PO  Start: 18 1800   End: 18   Admin. Amount: 1 tablet (1 × 5 mg tablet)  Last Admin: 18  Dispense Loc: [facility] 6C1  Administrations Remainin        1707 (5 mg)-Given             Discontinued Medications  Medications 04/10/18 04/11/18 04/12/18 04/13/18 04/14/18 04/15/18 04/16/18         Dose: 60 mg  Freq: 3 TIMES DAILY Route: IV  Start: 18 0800   End: 18 09   Admin Instructions: For ordered doses up to 40 mg, give IV Push undiluted over 1-2 minutes.    Admin. Amount: 60 mg = 6 mL Conc: 10 mg/mL  Last Admin: 18 08  Dispense Loc: [facility] 6C[facility]  Infused Over: 1-2 Minutes  Volume: 6 mL        0927 (60 mg)-Given       1352 (60 mg)-Given       2030 (60 mg)-Given        0851 (60 mg)-Given       0927-Med Discontinued

## 2018-04-11 NOTE — IP AVS SNAPSHOT
"` `     [address] [facility]: [phone]                                              INTERAGENCY TRANSFER FORM - NURSING   2018                    Hospital Admission Date: 2018  ÓSCAR BROWN   : 1927  Sex: Female        Attending Provider: Sena Jay MD     Allergies:  Oxycodone    Infection:  None   Service:  CARDIOLOGY    Ht:  1.651 m (5' 5\")   Wt:  70.4 kg (155 lb 1.6 oz)   Admission Wt:  71.9 kg (158 lb 9.6 oz)    BMI:  25.81 kg/m 2   BSA:  1.8 m 2            Patient PCP Information     Provider PCP Type    MELANIE Trivedi      Current Code Status     Date Active Code Status Order ID Comments User Context       Prior      Code Status History     Date Active Date Inactive Code Status Order ID Comments User Context    2018  5:42 PM  DNR/DNI [id number]  Malcolm Covarrubias MD Outpatient    2018  3:54 AM 2018  5:42 PM DNR/DNI [id number]  Rachael Mock MD Inpatient    2018  3:07 AM 2018  4:29 PM Full Code [id number]  Kishan Finn MD Inpatient    2016  9:02 AM 2018  3:07 AM Full Code [id number]  Tristan Eduardo DO Outpatient    2016 11:14 PM 2016  9:02 AM Full Code [id number]  Erendira Carter MD Inpatient    10/22/2011 11:44 AM 2016 11:14 PM Full Code [id number]  Edwin Bedoya MD Outpatient    10/22/2011  3:21 AM 10/22/2011 11:44 AM Full Code [id number]  Amanda Plummer Inpatient      Advance Directives        Scanned docmt in ACP Activity?           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    Age-related macular degeneration Medium  2/10/2009    Benign neoplasm of eyelid Medium  2/10/2009 "    Pseudophakia Medium  2/10/2009    Posterior vitreous detachment Medium  2/10/2009    History of aortic valve replacement Medium  6/29/2011    Atrial fibrillation (H) Medium  10/22/2011    Anticoagulated on warfarin Medium  10/22/2011    Bradycardia Medium  10/22/2011    Back pain Medium  10/22/2011    Acute renal failure with other specified pathological lesion in kidney (H) Medium  10/22/2011    Long term current use of anticoagulant therapy Medium  9/11/2012    Dry eyes Medium  11/19/2012    Hypertension Medium  11/29/2012    History of repair of hip joint Medium  9/5/2013    Posterior capsular opacification Medium  9/11/2015    Epiretinal membrane Medium  10/5/2015    Chronic atrial fibrillation (H) Medium  1/19/2016    Dyslipidemia Medium  5/9/2016    Knee pain Medium  8/1/2016    Spinal stenosis Medium  11/3/2016    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 Medium  11/3/2016    Hypothyroidism Medium  11/4/2016    (HFpEF) heart failure with preserved ejection fraction (H) Medium  11/4/2016    Generalized osteoarthritis Medium  11/4/2016    Cellulitis of hand Medium  2/14/2018      Immunizations     Name Date      Influenza (High Dose) 3 valent vaccine 09/26/17          END      ASSESSMENT     Discharge Profile Flowsheet     EXPECTED DISCHARGE     GASTROINTESTINAL (ADULT,PEDIATRIC,OB)      Expected Discharge Date  04/15/18 04/15/18 1004   GI WDL  WDL 04/16/18 0946    DISCHARGE NEEDS ASSESSMENT     Last Bowel Movement  04/15/18 04/16/18 0946    Concerns To Be Addressed  all concerns addressed in this encounter 04/15/18 1004   Passing flatus  yes 04/16/18 0946    Patient/family verbalizes understanding of discharge plan recommendations?  Yes 04/15/18 1004   COMMUNICATION ASSESSMENT      Medical Team notified of plan?  yes 04/15/18 1004   Patient's communication style  spoken language (English or Bilingual) 04/11/18 2320    Anticipated Changes Related to Illness  none 04/15/18 1004   SKIN       "Equipment Currently Used at Home  walker, rolling 04/15/18 1004   Inspection of bony prominences  Full except (identify areas not inspected) 04/16/18 0946    Transportation Available  family or friend will provide;car 04/15/18 1004   Inspection under devices  Full 04/16/18 0946    ASSESSMENT OF FUNCTIONAL STATUS     Skin WDL  ex;characteristics 04/16/18 0946    Prior to admission patient needed assistance with:  Medications;Meal preparation;Laundry/Housekeeping;Shopping;Transportation;Handling finances;Home maintenance/Yard work 04/15/18 1004   Skin Color/Characteristics  pale 04/16/18 0946    FUNCTIONAL LEVEL CURRENT     Skin Temperature  warm 04/16/18 0946    Ambulation  3 - assistive equipment and person 04/15/18 1004   Skin Moisture  flaky;dry 04/16/18 0946    Transferring  2 - assistive person 04/15/18 1004   Skin Elasticity  slow return to original state 04/16/18 0946    Toileting  3 - assistive equipment and person 04/15/18 1004   Skin Integrity  bruise(s);scar(s) 04/16/18 0946    Bathing  2 - assistive person 04/15/18 1004   Full except areas not inspected   Buttock, left;Buttock, right;Sacrum;Coccyx 04/16/18 0946    Dressing  2 - assistive person 04/15/18 1004   SAFETY      Eating  0 - independent 04/15/18 1004   Safety WDL  WDL 04/16/18 0946    Communication  2 - difficulty understanding (not related to language barrier) 04/15/18 1004   Safety Equipment  oxygen flowmeter 04/15/18 0927    Swallowing  0 - swallows foods/liquids without difficulty 04/15/18 1004   All Alarms  none present 04/16/18 0946    Change in Functional Status Since Onset of Current Illness/Injury  yes 04/15/18 1004                      Assessment WDL (Within Defined Limits) Definitions           Safety WDL     Effective: 09/28/15    Row Information: <b>WDL Definition:</b> Bed in low position, wheels locked; call light in reach; upper side rails up x 2; ID band on<br> <font color=\"gray\"><i>Item=AS safety wdl>>List=AS safety " "wdl>>Version=F14</i></font>      Skin WDL     Effective: 09/28/15    Row Information: <b>WDL Definition:</b> Warm; dry; intact; elastic; without discoloration; pressure points without redness<br> <font color=\"gray\"><i>Item=AS skin wdl>>List=AS skin wdl>>Version=F14</i></font>      Vitals     Vital Signs Flowsheet     QUICK ADDS     Functioning  can do most things, but pain gets in the way of some 04/16/18 0432    Quick Adds  Comments 04/13/18 1302   Sleep  normal sleep 04/16/18 0432    COMMENTS     ANALGESIA SIDE EFFECTS MONITORING      Comments  Post PT 04/13/18 1302   Side Effects Monitoring: Respiratory Quality  R 04/15/18 2244    VITAL SIGNS     Side Effects Monitoring: Respiratory Depth  N 04/15/18 2244    Temp  98  F (36.7  C) 04/16/18 0716   Side Effects Monitoring: Sedation Level  1 04/15/18 2244    Temp src  Oral 04/16/18 0716   HEIGHT AND WEIGHT      Resp  14 04/16/18 0716   Height  1.651 m (5' 5\") 04/11/18 2253    Pulse  79 04/15/18 1232   Height Method  Stated 04/11/18 2253    Heart Rate  70 04/16/18 0716   Weight  70.4 kg (155 lb 1.6 oz) 04/16/18 0213    Pulse/Heart Rate Source  Monitor 04/14/18 1552   Weight Method  Standing scale 04/15/18 0308    BP  100/47 04/16/18 0716   BSA (Calculated - sq m)  1.82 04/11/18 2253    BP Location  Right arm 04/16/18 0716   BMI (Calculated)  26.45 04/11/18 2253    OXYGEN THERAPY     EKG MONITORING      SpO2  96 % 04/16/18 0716   Cardiac Regularity  Irregular 04/11/18 1800    O2 Device  None (Room air) 04/16/18 0716   Cardiac Rhythm  Atrial fibrillation 04/11/18 1845    Oxygen Delivery  1 LPM 04/13/18 0721   POSITIONING      PAIN/COMFORT     Body Position  independently positioning 04/16/18 0946    Patient Currently in Pain  denies 04/16/18 0826   Head of Bed (HOB)  HOB at 15 degrees 04/15/18 0927    Preferred Pain Scale  CAPA (Clinically Aligned Pain Assessment) ([facility], [facility] and [facility] Adults Only) 04/16/18 0846   Positioning/Transfer Devices  pillows 04/15/18 0901 "    Patient's Stated Pain Goal  No pain 04/15/18 0917   Chair  Upright in chair 04/16/18 0946    0-10 Pain Scale  2 04/15/18 0917   DAILY CARE      Pain Location  Knee 04/15/18 2244   Activity Management  ambulated to bathroom 04/16/18 0946    Pain Orientation  Right;Left 04/15/18 2244   Activity Assistance Provided  assistance, stand-by 04/16/18 0946    Pain Descriptors  Aching 04/15/18 2244   Assistive Device Utilized  walker 04/16/18 0946    Pain Intervention(s)  Medication (See eMAR) 04/15/18 2244   Additional Documentation  Activity Device Assistance (Row) 04/13/18 1005    Response to Interventions  Decrease in pain 04/15/18 0917   ECG      Additional Documentation  CAPA (Group) 04/15/18 0917   ECG Rhythm  Atrial fibrillation 04/15/18 0822    CLINICALLY ALIGNED PAIN ASSESSMENT (CAPA) ([facility], [facility] ADULTS ONLY)     Ectopy  PVC 04/15/18 0822    Comfort  comfortably manageable 04/16/18 0432   Ectopy Frequency  Rare 04/15/18 0822    Change in Pain  about the same 04/16/18 0432   Lead Monitored  Lead II 04/15/18 0822    Pain Control  partially effective 04/16/18 0432   Equipment  electrodes changed 04/15/18 0822            Patient Lines/Drains/Airways Status    Active LINES/DRAINS/AIRWAYS     None            Patient Lines/Drains/Airways Status    Active PICC/CVC     None            Intake/Output Detail Report     Date Intake   Output   Net    Shift P.O. IV Piggyback Total Urine Other Total       Day 04/15/18 0000 - 04/15/18 0659 100 -- 100 325 -- 325 -225    Sara 04/15/18 0700 - 04/15/18 1459 180 -- 180 425 -- 425 -245    Noc 04/15/18 1500 - 04/15/18 2359 200 -- 200 200 -- 200 0    Day 04/16/18 0000 - 04/16/18 0659 150 -- 150 500 -- 500 -350    Sara 04/16/18 0700 - 04/16/18 1459 240 -- 240 200 -- 200 40      Last Void/BM       Most Recent Value    Urine Occurrence 1 at 04/14/2018 1200    Stool Occurrence 1 at 04/15/2018 0300      Case Management/Discharge Planning     Case Management/Discharge Planning  Flowsheet     REFERRAL INFORMATION     Medical Team notified of plan?  yes 04/15/18 1004    Arrived From  -- ([facility]) 11/04/16 2131   Anticipated Changes Related to Illness  none 04/15/18 1004    LIVING ENVIRONMENT     Transportation Available  family or friend will provide;car 04/15/18 1004    Lives With  child(sarah), adult (Son) 04/13/18 1405   FINAL RESOURCES      Living Arrangements  house 04/13/18 1405   Equipment Currently Used at Home  walker, rolling 04/15/18 1004    ASSESSMENT OF FUNCTIONAL STATUS     MH/[facility] CAREGIVER      Prior to admission patient needed assistance with:  Medications;Meal preparation;Laundry/Housekeeping;Shopping;Transportation;Handling finances;Home maintenance/Yard work 04/15/18 1004   Filed Complexity Screen Score  6 04/12/18 0901    COPING/STRESS     ABUSE RISK SCREEN      Major Change/Loss/Stressor  hospitalization 04/11/18 2320   QUESTION TO PATIENT:  Has a member of your family or a partner(now or in the past) intimidated, hurt, manipulated, or controlled you in any way?  no 04/11/18 2320    EXPECTED DISCHARGE     QUESTION TO PATIENT: Do you feel safe going back to the place where you are living?  yes 04/11/18 2320    Expected Discharge Date  04/15/18 04/15/18 1004   OBSERVATION: Is there reason to believe there has been maltreatment of a vulnerable adult (ie. Physical/Sexual/Emotional abuse, self neglect, lack of adequate food, shelter, medical care, or financial exploitation)?  no 04/11/18 2320    ASSESSMENT/CONCERNS TO BE ADDRESSED     OTHER      Concerns To Be Addressed  all concerns addressed in this encounter 04/15/18 1004   Are you depressed or being treated for depression?  No 04/11/18 2321    DISCHARGE PLANNING     HOMICIDE RISK      Patient/family verbalizes understanding of discharge plan recommendations?  Yes 04/15/18 1004   Feels Like Hurting Others  no 04/11/18 8906

## 2018-04-11 NOTE — LETTER
Transition Communication Hand-off for Care Transitions to Next Level of Care Provider    Name: Jayne Nino  : 1927  MRN #: [id number]  Primary Care Provider: MELANIE TOWNSEND     Primary Clinic: [facility] [address]  [facility] [address]     Reason for Hospitalization:  Heart failure with preserved ejection fraction (H) [I50.30]  Admit Date/Time: 2018  5:33 PM  Discharge Date: 18  Payor Source: Payor: [employer] / Plan: [employer] PLAN / Product Type: HMO /     Readmission Assessment Measure (HENRY) Risk Score/category: Elevated    Reason for Communication Hand-off Referral: Fragility    Discharge Plan:  B[facility]ol[facility] Ellsworth  PH: [phone]  F: [phone]    Discharge Plan:       Most Recent Value    Concerns To Be Addressed all concerns addressed in this encounter           Concern for non-adherence with plan of care:   Y/N N  Discharge Needs Assessment:  Needs       Most Recent Value    Anticipated Changes Related to Illness none    Equipment Currently Used at Home walker, rolling    Transportation Available family or friend will provide, car          Already enrolled in Tele-monitoring program and name of program:  Unknown  Follow-up specialty is recommended: Yes    Follow-up plan:  No future appointments.    Any outstanding tests or procedures:        Referrals     Future Labs/Procedures    Occupational Therapy Adult Consult     Comments:    Evaluate and treat as clinically indicated.    Reason:  Deconditioning, inability to perform ADLs    Physical Therapy Adult Consult     Comments:    Evaluate and treat as clinically indicated.    Reason:  Deconditioning            Key Recommendations:  None for today.  Thank you,    ARIANNA Brownlee, APSW  [address]   Phone: [phone]  Pager: [phone]  Unit: [phone]

## 2018-04-11 NOTE — IP AVS SNAPSHOT
"          [facility] [address] [facility] BANK: [phone]                                              INTERAGENCY TRANSFER FORM - LAB / IMAGING / EKG / EMG RESULTS   2018                    Hospital Admission Date: 2018  ÓSCAR BROWN   : 1927  Sex: Female        Attending Provider: Sena Jay MD     Allergies:  Oxycodone    Infection:  None   Service:  CARDIOLOGY    Ht:  1.651 m (5' 5\")   Wt:  70.4 kg (155 lb 1.6 oz)   Admission Wt:  71.9 kg (158 lb 9.6 oz)    BMI:  25.81 kg/m 2   BSA:  1.8 m 2            Patient PCP Information     Provider PCP Type    MELANIE TOWNSEND General         Lab Results - 3 Days      INR [[id number]] (Abnormal)  Resulted: 18, Result status: Final result    Ordering provider: Rachael Mock MD  04/15/18 230 Resulting lab: [facility]    Specimen Information    Type Source Collected On   Blood  18 0621          Components       Value Reference Range Flag Lab   INR 2.81 0.86 - 1.14 H 51            Basic metabolic panel [[id number]] (Abnormal)  Resulted: 04/15/18 0719, Result status: Final result    Ordering provider: Rachael Mock MD  18 Resulting lab: [facility]    Specimen Information    Type Source Collected On   Blood  04/15/18 06          Components       Value Reference Range Flag Lab   Sodium 140 133 - 144 mmol/L  51   Potassium 3.7 3.4 - 5.3 mmol/L  51   Chloride 106 94 - 109 mmol/L  51   Carbon Dioxide 26 20 - 32 mmol/L  51   Anion Gap 8 3 - 14 mmol/L  51   Glucose 102 70 - 99 mg/dL H 51   Urea Nitrogen 57 7 - 30 mg/dL H 51   Creatinine 1.45 0.52 - 1.04 mg/dL H 51   GFR Estimate 34 >60 mL/min/1.7m2 L 51   Comment:  Non  GFR Calc   GFR Estimate If Black 41 >60 mL/min/1.7m2 L 51   Comment:  African American GFR Calc   Calcium 8.8 8.5 - 10.1 mg/dL  51            Magnesium [[id number]] (Abnormal)  Resulted: 04/15/18 0719, Result status: Final result    " Ordering provider: Rachael Mock MD  04/14/18 2300 Resulting lab: [facility]    Specimen Information    Type Source Collected On   Blood  04/15/18 0625          Components       Value Reference Range Flag Lab   Magnesium 2.4 1.6 - 2.3 mg/dL H 51            INR [[id number]] (Abnormal)  Resulted: 04/15/18 0716, Result status: Final result    Ordering provider: Rachael Mock MD  04/14/18 2300 Resulting lab: [facility]    Specimen Information    Type Source Collected On   Blood  04/15/18 0625          Components       Value Reference Range Flag Lab   INR 2.55 0.86 - 1.14 H 51            Potassium [[id number]]  Resulted: 04/14/18 1713, Result status: Final result    Ordering provider: Malcolm Covarrubias MD  04/14/18 1010 Resulting lab: [facility]    Specimen Information    Type Source Collected On   Blood  04/14/18 1638          Components       Value Reference Range Flag Lab   Potassium 3.9 3.4 - 5.3 mmol/L  51            Magnesium [[id number]]  Resulted: 04/14/18 0650, Result status: Final result    Ordering provider: Rachael Mock MD  04/13/18 2300 Resulting lab: [facility]    Specimen Information    Type Source Collected On   Blood  04/14/18 0616          Components       Value Reference Range Flag Lab   Magnesium 2.0 1.6 - 2.3 mg/dL  51            Basic metabolic panel [[id number]] (Abnormal)  Resulted: 04/14/18 0650, Result status: Final result    Ordering provider: Rachael Mock MD  04/13/18 2300 Resulting lab: [facility]    Specimen Information    Type Source Collected On   Blood  04/14/18 0616          Components       Value Reference Range Flag Lab   Sodium 141 133 - 144 mmol/L  51   Potassium 3.3 3.4 - 5.3 mmol/L L 51   Chloride 106 94 - 109 mmol/L  51   Carbon Dioxide 27 20 - 32 mmol/L  51   Anion Gap 8 3 - 14 mmol/L  51   Glucose 102 70  - 99 mg/dL H 51   Urea Nitrogen 54 7 - 30 mg/dL H 51   Creatinine 1.35 0.52 - 1.04 mg/dL H 51   GFR Estimate 37 >60 mL/min/1.7m2 L 51   Comment:  Non  GFR Calc   GFR Estimate If Black 45 >60 mL/min/1.7m2 L 51   Comment:  African American GFR Calc   Calcium 9.0 8.5 - 10.1 mg/dL  51            INR [[id number]] (Abnormal)  Resulted: 04/14/18 0641, Result status: Final result    Ordering provider: Rachael Mock MD  04/13/18 2300 Resulting lab: [facility]    Specimen Information    Type Source Collected On   Blood  04/14/18 0616          Components       Value Reference Range Flag Lab   INR 2.24 0.86 - 1.14 H 51            Glucose by meter [[id number]]  Resulted: 04/13/18 1756, Result status: Final result    Ordering provider: Sena Jay MD  04/13/18 1745 Resulting lab: POINT OF CARE TEST, GLUCOSE    Specimen Information    Type Source Collected On     04/13/18 1745          Components       Value Reference Range Flag Lab   Glucose 91 70 - 99 mg/dL  170            Digoxin level [[id number]] (Abnormal)  Resulted: 04/13/18 0753, Result status: Final result    Ordering provider: Malcolm Covarrubias MD  04/12/18 0915 Resulting lab: [facility]    Specimen Information    Type Source Collected On   Blood  04/13/18 0617          Components       Value Reference Range Flag Lab   Digoxin Level 0.4 0.5 - 2.0 ug/L L 51            Magnesium [[id number]]  Resulted: 04/13/18 0714, Result status: Final result    Ordering provider: Rachael Mock MD  04/12/18 2300 Resulting lab: [facility]    Specimen Information    Type Source Collected On   Blood  04/13/18 0617          Components       Value Reference Range Flag Lab   Magnesium 2.1 1.6 - 2.3 mg/dL  51            Basic metabolic panel [[id number]] (Abnormal)  Resulted: 04/13/18 0714, Result status: Final result    Ordering provider: Rachael Mock MD  04/12/18  2300 Resulting lab: [facility]    Specimen Information    Type Source Collected On   Blood  04/13/18 0617          Components       Value Reference Range Flag Lab   Sodium 142 133 - 144 mmol/L  51   Potassium 3.7 3.4 - 5.3 mmol/L  51   Chloride 108 94 - 109 mmol/L  51   Carbon Dioxide 28 20 - 32 mmol/L  51   Anion Gap 6 3 - 14 mmol/L  51   Glucose 119 70 - 99 mg/dL H 51   Urea Nitrogen 48 7 - 30 mg/dL H 51   Creatinine 1.37 0.52 - 1.04 mg/dL H 51   GFR Estimate 36 >60 mL/min/1.7m2 L 51   Comment:  Non  GFR Calc   GFR Estimate If Black 44 >60 mL/min/1.7m2 L 51   Comment:  African American GFR Calc   Calcium 8.9 8.5 - 10.1 mg/dL  51            INR [[id number]] (Abnormal)  Resulted: 04/13/18 0649, Result status: Final result    Ordering provider: Rachael Mock MD  04/12/18 2300 Resulting lab: [facility]    Specimen Information    Type Source Collected On   Blood  04/13/18 0617          Components       Value Reference Range Flag Lab   INR 2.12 0.86 - 1.14 H 51            CBC with platelets differential [[id number]] (Abnormal)  Resulted: 04/13/18 0641, Result status: Final result    Ordering provider: Malcolm Covarrubias MD  04/12/18 2300 Resulting lab: [facility]    Specimen Information    Type Source Collected On   Blood  04/13/18 0617          Components       Value Reference Range Flag Lab   WBC 4.3 4.0 - 11.0 10e9/L  51   RBC Count 3.23 3.8 - 5.2 10e12/L L 51   Hemoglobin 8.9 11.7 - 15.7 g/dL L 51   Hematocrit 30.0 35.0 - 47.0 % L 51   MCV 93 78 - 100 fl  51   MCH 27.6 26.5 - 33.0 pg  51   MCHC 29.7 31.5 - 36.5 g/dL L 51   RDW 16.7 10.0 - 15.0 % H 51   Platelet Count 139 150 - 450 10e9/L L 51   Diff Method Automated Method   51   % Neutrophils 64.5 %  51   % Lymphocytes 26.0 %  51   % Monocytes 5.8 %  51   % Eosinophils 3.0 %  51   % Basophils 0.5 %  51   % Immature Granulocytes 0.2 %  51  "  Nucleated RBCs 0 0 /100  51   Absolute Neutrophil 2.8 1.6 - 8.3 10e9/L  51   Absolute Lymphocytes 1.1 0.8 - 5.3 10e9/L  51   Absolute Monocytes 0.3 0.0 - 1.3 10e9/L  51   Absolute Eosinophils 0.1 0.0 - 0.7 10e9/L  51   Absolute Basophils 0.0 0.0 - 0.2 10e9/L  51   Abs Immature Granulocytes 0.0 0 - 0.4 10e9/L  51   Absolute Nucleated RBC 0.0   51            Testing Performed By     Lab - Abbreviation Name Director Address Valid Date Range    51 - Unknown [facility] Unknown [address] [facility] [address] 12/31/14 1010 - Present    170 - Unknown POINT OF CARE TEST, GLUCOSE Unknown Unknown 10/31/11 1114 - Present            Unresulted Labs (24h ago through future)    Start       Ordered    04/12/18 0500  INR  DAILY,   Routine      04/12/18 0147    Unscheduled  Potassium  CONDITIONAL X 1 STAT,   STAT     Comments:  PRN for CHF    04/12/18 0147    Unscheduled  Magnesium  CONDITIONAL X 1 STAT,   STAT     Comments:  PRN for CHF    04/12/18 0147    Unscheduled  Potassium  (Potassium Replacement - \"High\" - Replacement for all levels less than 4.1 mmol/L - UU,UR,UA,RH,SH,PH,WY )  CONDITIONAL (SPECIFY),   Routine     Comments:  Obtain Potassium Level for these conditions:  *IF no potassium result within 24 hrs before initiation of order set, draw potassium level with next lab collect.    *2 HOURS AFTER last IV potassium replacement dose and 4 hours after an oral replacement dose when potassium replacement given for level less than 3.4.  *Next morning after potassium dose.     Repeat Potassium Replacement if necessary.    04/12/18 0147    Unscheduled  Magnesium  (Magnesium Replacement - Adult - \"High\" - Replacement for all levels less than or equal to 2 mg/dL)  CONDITIONAL (SPECIFY),   Routine     Comments:  Obtain Magnesium Level for these conditions:  *IF no magnesium result within 24 hrs before initiation of order set, draw magnesium level with next lab collect.    *2 HOURS AFTER last " magnesium replacement dose when magnesium replacement given for level less than 1.6  *Next morning after magnesium dose.     Repeat Magnesium Replacement if necessary.    18         ECG/EMG Results      Echo complete [[id number]]  Resulted: 18, Result status: Edited Result - FINAL    Ordering provider: Rachael Mock MD  18 Resulted by: Rodriguez Schrader MD    Performed: 18 - 18 Resulting lab: RADIOLOGY RESULTS    Narrative:       [id number]  [id number]  [id number]  [id number]^AKASH^LYUDMILA^           [facility],[place]  Echocardiography Laboratory  [address] [place] [address]     Name: ÓSCAR BROWN  MRN: [id number]  : 1927  Study Date: 2018 09:10 AM  Age: 89 yrs  Gender: Female  [place] [facility] [place] Study: CHF  Ordering Physician: LYUDMILA BUSTOS  Performed By: IRINA Haro     BSA: 1.8 m2  Height: 65 in  Weight: 158 lb  BP: 141/88 mmHg  _____________________________________________________________________________  __        Procedure  Echocardiogram with two-dimensional, color and spectral Doppler performed.  _____________________________________________________________________________  __        Interpretation Summary  The Ejection Fraction is estimated at 60-65%.  The right ventricle is normal size. Global right ventricular function is  moderately reduced.  Severe mitral annular calcification is present. Mild mitral insufficiency is  present.  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 Estimated mean RA pressure  is >15 mmHg.  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 Some  of it is central and some is paravalvular.  Compared to prior study on 2/26/10, the patient is now s/p AVR.  There is no  post-AVR echo to compare gradients or aortic insufficiency. Recommend AMANDEEP to  assess mechanism and severity of aortic insufficiency.     _____________________________________________________________________________  __        Left Ventricle  Left ventricular size is normal. Severe concentric wall thickening consistent  with left ventricular hypertrophy is present. Unable to assess diastolic  function with mitral annular calcification. The Ejection Fraction is estimated  at 60-65%.     Right Ventricle  The right ventricle is normal size. Global right ventricular function is  moderately reduced.     Atria  The right atria appears normal. Severe left atrial enlargement is present. The  atrial septum is intact as assessed by color Doppler .        Mitral Valve  Severe mitral annular calcification is present. Mild mitral insufficiency is  present.     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 Some  of it is central and some is paravalvular.     Tricuspid Valve  The peak velocity of the tricuspid regurgitant jet is not obtainable. Trace  tricuspid insufficiency is present.     Pulmonic Valve  Mild pulmonic insufficiency is present.     Vessels  Normal sized aortic root. 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  Estimated mean RA pressure is >15 mmHg. Unable to assess pulmonary artery  size.     Pericardium  No pericardial effusion is present.        Compared to Previous Study  Compared to prior study on 2/26/10, the patient is now s/p AVR. There is no  post-AVR echo to compare gradients or aortic insufficiency.     Attestation  I have personally viewed the imaging and agree with the interpretation and  report as documented by the fellow, Flory Lozano, and/or edited by  me.  _____________________________________________________________________________  __     MMode/2D Measurements & Calculations  RVDd: 3.4 cm  IVSd: 1.6 cm  LVIDd: 4.2 cm  LVIDs: 2.9 cm  LVPWd: 1.7 cm  FS: 31.1 %  LV mass(C)d: 278.9 grams  LV mass(C)dI: 155.9 grams/m2  asc Aorta Diam: 3.3 cm  LVOT diam: 1.9 cm  LVOT area: 2.9 cm2  LA Volume (BP): 125.9 ml  RWT: 0.79  TAPSE: 1.1 cm        Doppler Measurements & Calculations  MV E max mat: 168.2 cm/sec  MV A max mat: 56.8 cm/sec  MV E/A: 3.0     MV max P.2 mmHg  MV mean PG: 3.0 mmHg  MV V2 VTI: 39.2 cm  MVA(VTI): 2.3 cm2  MV dec slope: 992.1 cm/sec2  MV dec time: 0.17 sec  Ao V2 max: 376.6 cm/sec  Ao max P.7 mmHg  Ao V2 mean: 268.9 cm/sec  Ao mean P.9 mmHg  Ao V2 VTI: 75.2 cm  LEN(I,D): 1.2 cm2  LEN(V,D): 1.3 cm2  AI P1/2t: 358.0 msec  LV V1 max P.0 mmHg  LV V1 max: 165.7 cm/sec  LV V1 VTI: 31.2 cm  SV(LVOT): 90.1 ml  SI(LVOT): 50.3 ml/m2  AV Mat Ratio (DI): 0.44  LEN Index (cm2/m2): 0.67  Lateral E/e': 31.7     _____________________________________________________________________________  __           Report approved by: Narciso Irving 2018 12:56 PM       1    Type Source Collected On     18 0910          View Image (below)              Encounter-Level Documents:     There are no encounter-level documents.      Order-Level Documents:     There are no order-level documents.

## 2018-04-11 NOTE — IP AVS SNAPSHOT
` `     [address] [facility]: [phone]                 INTERAGENCY TRANSFER FORM - NOTES (H&P, Discharge Summary, Consults, Procedures, Therapies)   2018                    Hospital Admission Date: 2018  ÓSCAR NINO   : 1927  Sex: Female        Patient PCP Information     Provider PCP Type    MELANIE TOWNSEND General         History & Physicals      H&P by Sena Jay MD at 2018 12:27 AM     Author:  Sena Jay MD Service:  Cardiology Author Type:  Physician    Filed:  2018  9:24 PM Date of Service:  2018 12:27 AM Creation Time:  2018 12:27 AM    Status:  Signed :  Sena Jay MD (Physician)                                        History & Physical  Service: Cards 1     Óscar Nino MRN# [id number]   YOB: 1927 Age: 89 year old      Date of Admission:  2018    Chief Complaint: Dyspnea      History of Present Illness:    Óscar Nino is 89 year old female with a history of atrial fibrillation, HFpEF, HTN, and bioprosthetic aortic valve, mild/mod MR/TR, and CKD presents with 1 week of progressive dyspnea.  She was seen by PCP in March and from that note it appears that she was taken off of K supplement and metolazone, although patient is unsure if she is still taking metolazone or not.   She says she has noticed some weight gain (dry weight ~ 145, now closer to 160), increased LE swelling, PND, and orthopnea.  She is usually limited to ADLs with a walker in her house, where she lives with her son -- and has noticed some dyspnea with ADLs as well.  She says she has been taking her meds.    She is also suffering from generalized pruritus for the last 2 weeks.  At the last PCP visit she was started on allopurinol due to elevated uric acid and hx of gout/pseudogout; she is also being tapered off prednisone.   Patient says her pruritus is better now after stopping allopurinol and using OTC emmolient (Cerave).        Denies chest pain, abd pain, dysuria.      Follows with Internal Medicine and Cardiology at [facility].       ROS: 10 point ROS is negative except per HPI    Past Medical History:  - reviewed    Past Surgical History:  - reviewed    Medications:  Reviewed    Allergies:  - reviewed    Social History:  - Lives with son.  Denies tobacco, alcohol, illicit drugs.  Walks with walker, independent with ADLs including med mgmt.     Family History:  - CAD in father/mother    Exam:  Temp:  [97.5  F (36.4  C)-97.6  F (36.4  C)] 97.5  F (36.4  C)  Pulse:  [96] 96  Heart Rate:  [80-98] 80  Resp:  [18-28] 19  BP: (130-151)/(61-91) 137/61  SpO2:  [87 %-100 %] 96 %    Intake/Output Summary (Last 24 hours) at 04/12/18 0027  Last data filed at 04/11/18 2123   Gross per 24 hour   Intake                0 ml   Output              650 ml   Net             -650 ml       General: Alert, In mild resp distress  HEENT: Atraumatic  Neck: Supple   Resp: CTAB, no wheezes or crackles -- desats to 88% on RA, satting > 94% on 2L via NC  Cardiac: RRR, no murmur or extra heart sounds  Abdomen: Soft, nontender, nondistended. Active BS.    Extremities: mod LE edema from feet to thigh.   Skin: there are some bruising likely from anticoagulation and petechia likely from scratching.   Neuro and Psych: Alert & grossly oriented, CNS 3-12 grossly intact, moves all extremities equally  Bedside US: Normal EF, IVC 2.5 to 2.2cm with < 50% resp variability.     Labs/Imaging reviewed in the EMR.  Labs: BNP 14,338, trop 0.061, Cr 1.42 (baseline), Hb 10.2 (baseline)  Imaging: CXR with some possible mild cephalization but otherwise no florid pulm edema  EKG: RBBB and A fib (baseline)    Cardiac echo 9/2017:   CONCLUSION:    1. Normal LV size and systolic function. Severe concentric LVH.     2. Normal RV size and function.    3. Bioprosthetic aortic valve, not well seen. Mean gradient 35 mmHg     4. Mild mitral stenosis. Mild mitral regurgitation.     5. Mild  tricuspid regurgitation.     6. Estimated RV systolic pressure = 50 mmHg.    7. Patient with known bioprosthetic valve dysfunction, however,     mean gradients have increased compared to prior study.       Cardiac echo 10/2016   Technically difficult exam.     Normal LV size and systolic function.     Normal RV size and function.     Severe LA dilatation. Moderate RA dilation.     S/p bioprosthetic AVR with mild central AI. Mean gradient 14 mmHg     which is improved vs. prior study.    Mild mitral regurgitation.     Mild tricuspid regurgitation. Estimated RV systolic pressure = 34     mmHg + right atrial pressure.    Compared to the prior study, the mean aortic valve gradient and     estimated RVSP have decreased.     Left Ventricular Ejection Fraction: 65-70 %       Assessment/ Plan:  Jayne Nino is 89 year old female with a history of atrial fibrillation, HFpEF, HTN, and bioprosthetic aortic valve, mild/mod MR/TR, and CKD presents with 1 week of progressive dyspnea found to have elevated BNP and IVC without resp variability.    # Respiratory failure, on 2L oxygen  # Aortic stenosis, s/p 21mm Snowden bioprosthetic valve in 2011, with increasing mean gradient  # HFpEF  - Likely 2/2 HFpEF exacerbation.  Possibly from discontinued metolazone 1 month ago.  Could also be from worsening AS.  - Formal TTE pending  - Got 40 of lasix in the ED with good UOP.  Redose in the AM.  - Hold home 40 BID lasix.  At discharge, consider restarting metolazone and K supplements     # Generalized pruritus  - Hold allopurinol given possible itching  - Patient is unsure when she finished her prednisone course.  If has attack, can use prednisone.  - Emollient BID PRN for dry skin    --- CHRONIC ISSUES ---  # A fib: carvedilol, digoxin, pharmacy to dose warfarin  # hypothyroid: Levothyroxine  # Anemia, baseline 9-10.  Stable.  Continue home iron  # GERD: H2 blocker  #HLD: statin    FEN: 2g Na and 2L fluid diet.   Prophy: On  anticoagulation  Code Status: DNR/DNI.     Disposition Plan   Expected discharge: 2 - 3 days, recommended to prior living arrangement once patient is back on room air and PT/OT recs are in..    To be staffed in the AM.  Discussed with fellow Dr. Kaminski.     Rachael Mock DO, MS  PGY-2, Internal Medicine Resident  Pager [ND1.1]    Staff Physician Comments:     Encounter diagnosis:[GS1.1]  1. Heart failure with preserved ejection fraction (H)[GS1.2]          I personally interviewed and examined Jayne Nino today, and agree with residents assessment and plan as documented above.      Sena Jay MD     [place] [address]    Clinic nurse:  Yaya Campoverde LPN   Nurse Care Coordinator- Heart Care   [phone] option 1, than option 3    [phone] Appointments  [phone] Fax  [phone] After hours[GS1.1]         Revision History        User Key Date/Time User Provider Type Action    > GS1.2 4/13/2018  9:24 PM Sena Jay MD Physician Sign     GS1.1 4/13/2018  9:23 PM Sena Jay MD Physician      ND1.1 4/12/2018  1:50 AM Rachael Mock MD Resident Sign                     Discharge Summaries      Discharge Summaries by Malcolm Covarrubias MD at 4/15/2018  6:31 AM     Author:  Malcolm Covarrubias MD Service:  Cardiology Author Type:  Resident    Filed:  4/15/2018  7:46 AM Date of Service:  4/15/2018  6:31 AM Creation Time:  4/13/2018  5:31 PM    Status:  In Progress :  Malcolm Covarrubias MD (Resident)    Cosign Required:  Yes             [facility]    Discharge Summary  Cardiology    Date of Admission:  4/11/2018  Date of Discharge:  4/15/2018  Discharging Provider: Malcolm Covarrubias    Discharge Diagnoses   1. Acute hypoxemic respiratory failure  2. Acute on chronic diastolic heart  failure  3. 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 Demand ischemia   5. Paroxysmal atrial fibrillation     History of Present Illness     Per admitting physician -   Jayne Nino is 89 year old female with a history of atrial fibrillation, HFpEF, HTN, and bioprosthetic aortic valve, mild/mod MR/TR, and CKD presents with 1 week of progressive dyspnea.  She was seen by PCP in March and from that note it appears that she was taken off of K supplement and metolazone, although patient is unsure if she is still taking metolazone or not.   She says she has noticed some weight gain (dry weight ~ 145, now closer to 160), increased LE swelling, PND, and orthopnea.  She is usually limited to ADLs with a walker in her house, where she lives with her son -- and has noticed some dyspnea with ADLs as well.  She says she has been taking her meds.    Hospital Course   Jayne Nino was admitted on 4/11/2018.  The following problems were addressed during her hospitalization:    # Acute hypoxemic respiratory failure, resolved  # Acute on chronic diastolic heart failure   # Moderate aortic stenosis s/p 21mm Snowden bioprosthetic valve in 2011  Etiology of exacerbation was likely discontinuation of metolazone 1 month ago with subsequent weight gain. Worsening valvular disease was considered and TTE showed st[JS1.1]abl[JS1.2]e moderate AS but with moderate AI. Will need outpt monitoring to discuss potential need for valve in valve TAVR. Diuresed with several days of IV diuretics with good response. Transitioned to orals on day prior to dc.[JS1.1] Weight and creatinine stable.[JS1.2]       # Demand ischemia   Troponin elevated but down-trended after admission.[JS1.1] D[JS1.2]emand ischemia in the setting of decompensated CHF. No EKG changes. No symptoms of ACS.     Discussed with staff on day of discharge.     Malcolm Covarrubias MD  PGY-3 Internal Medicine     Significant Results and  Procedures   See echo in imaging below    Code Status   DNR / DNI    Primary Care Physician   MELANIE TOWNSEND[JS1.1]    Physical Exam   Temp: 97.9  F (36.6  C) Temp src: Oral BP: 102/51 Pulse: 82 Heart Rate: 79 Resp: 20 SpO2: 96 % O2 Device: None (Room air) Oxygen Delivery: 1 LPM  Vitals:    04/11/18 2245 04/12/18 0435 04/13/18 0122   Weight: 71.9 kg (158 lb 9.6 oz) 70.7 kg (155 lb 13.8 oz) 70.5 kg (155 lb 6.4 oz)[JS1.3]     Vital Signs with Ranges[JS1.1]  Temp:  [97.4  F (36.3  C)-99  F (37.2  C)] 97.9  F (36.6  C)  Pulse:  [82-84] 82  Heart Rate:  [79-87] 79  Resp:  [18-20] 20  BP: (102-121)/(47-65) 102/51  SpO2:  [92 %-96 %] 96 %  I/O last 3 completed shifts:  In: 100 [P.O.:100]  Out: 900 [Urine:900][JS1.3]    Constitutional:[JS1.1] No apparent distress[JS1.2]  Respiratory:[JS1.1] CTAB without wheezes or crackles[JS1.2]  Cardiovascular:[JS1.1] RRR, normal S1 S2, MICHAEL present[JS1.2]  GI:[JS1.1] NABS, soft, non tender[JS1.2]  Skin:[JS1.1] No rashes[JS1.2]   Musculoskeletal:[JS1.1] Warm extremities, trace edema[JS1.2]   Neurologic:[JS1.1] Alert and oriented, moving all extremities[JS1.2]     Discharge Disposition   Discharged to short-term care facility  Condition at discharge: Stable    Consultations This Hospital Stay[JS1.1]   CARDIAC REHAB IP CONSULT  NUTRITION SERVICES ADULT IP CONSULT  PHYSICAL THERAPY ADULT IP CONSULT  OCCUPATIONAL THERAPY ADULT IP CONSULT  PHARMACY TO DOSE WARFARIN  VASCULAR ACCESS CARE ADULT IP CONSULT  PHYSICAL THERAPY ADULT IP CONSULT  OCCUPATIONAL THERAPY ADULT IP CONSULT  VASCULAR ACCESS CARE ADULT IP CONSULT  SMOKING CESSATION PROGRAM IP CONSULT    Discharge Orders[JS1.4]     General info for SNF   Length of Stay Estimate: Short Term Care: Estimated # of Days <30  Condition at Discharge: Improving  Level of care:skilled   Rehabilitation Potential: Good  Admission H&P remains valid and up-to-date: Yes  Recent Chemotherapy: N/A  Use Nursing Home Standing Orders: Yes     Mantoux instructions    Give two-step Mantoux (PPD) Per Facility Policy Yes     Reason for your hospital stay   You were admitted for a flare of your heart failure with fluid weight gain. Your breathing improved with doses of IV diuretics (water pills). You were weaned off of oxygen and symptomatically felt much better. You will continue to rehabilitate at the TCU upon discharge.     Daily weights   Call Provider for weight gain of more than 2 pounds per day or 5 pounds per week.     Follow Up and recommended labs and tests   Follow up with [facility] physician.  The following labs/tests are recommended: BMP and INR.     Activity - Up ad cleo     DNR/DNI     Physical Therapy Adult Consult   Evaluate and treat as clinically indicated.    Reason:  Deconditioning     Occupational Therapy Adult Consult   Evaluate and treat as clinically indicated.    Reason:  Deconditioning, inability to perform ADLs     Advance Diet as Tolerated   Follow this diet upon discharge: Orders Placed This Encounter     Fluid restriction 2000 ML FLUID     2 Gram Sodium Diet[JS1.5]       Discharge Medications   Current Discharge Medication List      START taking these medications    Details   bumetanide (BUMEX) 2 MG tablet Take 1 tablet (2 mg) by mouth 2 times daily  Qty: 60 tablet, Refills: 0    Associated Diagnoses: Heart failure with preserved ejection fraction (H)      metolazone (ZAROXOLYN) 2.5 MG tablet Take 1 tablet (2.5 mg) by mouth daily as needed  Qty: 90 tablet, Refills: 1    Comments: For 5 lb weight gain  Associated Diagnoses: Heart failure with preserved ejection fraction (H)         CONTINUE these medications which have NOT CHANGED    Details   Multiple Vitamins-Minerals (MULTIVITAMIN ADULT PO) Take 1 tablet by mouth daily      VITAMIN D, CHOLECALCIFEROL, PO Take 1 tablet by mouth daily      ALLOPURINOL PO Take 100 mg by mouth daily      DIGOXIN PO Take 125 mcg by mouth every 48 hours      warfarin (COUMADIN) 5 MG tablet Take 2.5-5 mg by mouth daily  Take 2.5 mg on Tuesday and Sundays, and 5 mg ROW      carvedilol (COREG) 25 MG tablet Take 25 mg by mouth 2 times daily      ferrous sulfate 140 (45 FE) MG TBCR Take 140 mg by mouth daily      atorvastatin (LIPITOR) 80 MG tablet Take 80 mg by mouth At Bedtime      polyethylene glycol 0.4%- propylene glycol 0.3% (SYSTANE ULTRA) 0.4-0.3 % SOLN ophthalmic solution Place 1 drop into both eyes every hour as needed for dry eyes      famotidine (PEPCID) 20 MG tablet Take 20 mg by mouth daily       acetaminophen (TYLENOL) 325 MG tablet Take 325-650 mg by mouth every 4 hours as needed   Qty: 250 tablet      levothyroxine (SYNTHROID, LEVOTHROID) 125 MCG tablet Take 1 tablet by mouth daily.  Qty: 90 tablet, Refills: 1    Associated Diagnoses: Bradycardia         STOP taking these medications       FUROSEMIDE PO Comments:   Reason for Stopping:[JS1.4]             Allergies   Allergies   Allergen Reactions     Oxycodone      Pt states she takes it for back pain, only causes itching     Data   Most Recent 3 CBC's:[JS1.1]  Recent Labs   Lab Test  04/13/18   0617  04/11/18   1752  02/15/18   0600   WBC  4.3  4.0  7.5   HGB  8.9*  10.2*  9.0*   MCV  93  92  88   PLT  139*  185  163[JS1.3]      Most Recent 3 BMP's:[JS1.1]  Recent Labs   Lab Test  04/13/18   0617  04/12/18   1806  04/12/18   0614  04/11/18   1752   NA  142   --   147*  145*   POTASSIUM  3.7  3.6  3.4  3.7   CHLORIDE  108   --   110*  107   CO2  28   --   26  29   BUN  48*   --   35*  38*   CR  1.37*   --   1.31*  1.42*   ANIONGAP  6   --   11  9   NAVARRO  8.9   --   9.4  9.4   GLC  119*   --   106*  123*[JS1.3]     Most Recent 2 LFT's:[JS1.1]  Recent Labs   Lab Test  04/11/18   1752  02/13/18   2159   AST  13  13   ALT  17  17   ALKPHOS  93  76   BILITOTAL  1.0  1.0[JS1.3]     Most Recent INR's and Anticoagulation Dosing History:  Anticoagulation Dose History     Recent Dosing and Labs Latest Ref Rng & Units 2/13/2018 2/14/2018 2/15/2018 2/16/2018 4/11/2018 4/12/2018  4/13/2018    Warfarin 2.5 mg - - 5 mg 5 mg - - - -    Warfarin 5 mg - - - - - - 5 mg 5 mg    INR 0.86 - 1.14 2.20(H) 2.16(H) 2.13(H) 2.21(H) 2.92(H) 2.47(H) 2.12(H)        Most Recent 3 Troponin's:[JS1.1]  Recent Labs   Lab Test  04/12/18   0215  04/11/18   1752  10/21/11   1823  02/27/10   0644   02/25/10   2241   TROPI  0.053*  0.061*   --   1.200*   < >   --    TROPONIN   --    --   0.01   --    --   0.01    < > = values in this interval not displayed.     Results for orders placed or performed during the hospital encounter of 04/11/18   XR Chest 2 Views    Narrative    CHEST TWO VIEW 4/11/2018 7:02 PM     HISTORY: Shortness of breath, crackles.      COMPARISON: Chest x-ray 11/5/2016.      Impression    IMPRESSION: Two views of the chest are performed. Heart size is within  normal limits. Median sternotomy changes are noted. Patient is status  post aortic valve replacement. Mild interstitial lung changes are  present with probable atelectasis at the left lung base which is  stable. No evidence of pneumothorax or significant pleural effusions.    SATYA SAHNI MD[JS1.3]     Echo 4/12   The Ejection Fraction is estimated at 60-65%.  The right ventricle is normal size. Global right ventricular function is  moderately reduced.  Severe mitral annular calcification is present. Mild mitral insufficiency is  present.  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 Estimated mean RA pressure  is >15 mmHg.  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 Some  of it is central and some is paravalvular.  Compared to prior study on 2/26/10, the patient is now s/p AVR. There is no  post-AVR echo to compare gradients or aortic insufficiency. Recommend AMANDEEP to  assess mechanism and severity of aortic insufficiency.   [JS1.1]     Revision History         User Key Date/Time User Provider Type Action    > [N/A] 4/15/2018  7:46 AM Malcolm Covarrubias MD Resident Sign     JS1.5 4/15/2018  7:46 AM Malcolm Covarrubias MD Resident Sign     JS1.4 4/15/2018  7:45 AM Malcolm Covarrubias MD Resident      JS1.2 4/15/2018  7:42 AM Malcolm Covarrubias MD Resident      JS1.3 4/13/2018  5:38 PM Malcolm Covarrubias MD Resident Share     JS1.1 4/13/2018  5:31 PM Malcolm Covarrubias MD Resident                   Consult Notes     No notes of this type exist for this encounter.         Progress Notes - Physician (Notes from 04/13/18 through 04/16/18)      Progress Notes by Ebenezer Linares LISW at 4/15/2018 10:08 AM     Author:  Ebenezer Linares LISW Service:  (none) Author Type:      Filed:  4/15/2018 10:18 AM Date of Service:  4/15/2018 10:08 AM Creation Time:  4/15/2018 10:08 AM    Status:  Signed :  Ebenezer Linares LISW ()         Social Work Services Progress Note    Hospital Day: 5  Date of Initial Social Work Evaluation:  4/12/2017  Collaborated with:  Family, Med Team, Bartolome     Data:  SW spoke with Bartolome to confirm plan for admit today. Admission staff reported they are not taking people today due staffing issues because of the weather. Writer spoke with patient's son updated him on the situation. He will plan to transport his mother at 11 tomorrow (4/16/2018).      Intervention:  SW continue to work on facilitate discharge plan.       Assessment:  All are in agreement with plan.     Plan:    Anticipated Disposition:  Facility:  [facility]     Barriers to d/c plan:  Weather and Staffing    Follow Up:  Week day SW will confirm plan with facility and son tomorrow morning.     BRYN Barroso [facility]. Weekend    Page 0800 to 1600 [phone]  Page 1600-to Midnight [phone][GB1.1]       Revision History        User Key Date/Time User Provider Type Action    > GB1.1 4/15/2018 10:18 AM  Ebenezer Linares LISW  Sign            Progress Notes by Daniel Haro RN at 4/15/2018  9:30 AM     Author:  Daniel Haro RN Service:  Cardiology Author Type:  Registered Nurse    Filed:  4/15/2018 10:11 AM Date of Service:  4/15/2018  9:30 AM Creation Time:  4/15/2018  9:30 AM    Status:  Addendum :  Daniel Haro RN (Registered Nurse)         -Pt received orders that were carried out to discharge to a transitional care center.  -Pt and her son reviewed and understood her discharge instructions, orders, follow-up appointments and prescriptions.  -Pt's son to transport her to the [facility] this morning.[RP1.1]    [facility] contacted our hospital  to inform us that they couldn't accept the Pt today due to weather.[RP1.2]     Revision History        User Key Date/Time User Provider Type Action    > RP1.2 4/15/2018 10:11 AM Daniel Haro RN Registered Nurse Addend     RP1.1 4/15/2018  9:58 AM Daniel Haro, RN Registered Nurse Sign            Progress Notes by Sena Jay MD at 4/13/2018 11:52 AM     Author:  Sena Jay MD Service:  Cardiology Author Type:  Physician    Filed:  4/13/2018  9:26 PM Date of Service:  4/13/2018 11:52 AM Creation Time:  4/13/2018 11:52 AM    Status:  Signed :  Sena Jay MD (Physician)         [facility]    Cardiology Progress Note     Assessment & Plan   Jayne Nino is 89 year old female with a history of atrial fibrillation, HFpEF, HTN, and bioprosthetic aortic valve, mild/mod MR/TR, and CKD who presents with acute hypoxemic respiratory failure likely secondary to decompensated diastolic heart failure.     # Acute hypoxemic respiratory failure[JS1.1], improving[JS1.2]   # Acute on chronic diastolic heart failure   # Moderate aortic stenosis s/p 21mm Snowden bioprosthetic valve in 2011  Possibly from discontinued  metolazone 1 month ago with subsequent weight gain. Improving[JS1.1] slowly[JS1.2] with diuresis. TTE with moderate AS and AI. Will need outpt monitoring.[JS1.1] Will continue to diurese inpatient for the next 1-2 days given valvulopathies and tenuous fluid balance. Will monitor on oral diuretics before dc.[JS1.2]    -[JS1.1] Possible transition to oral diuretics tomorrow with dc to TCU Sunday or Monday if all goes well[JS1.2]   -[JS1.1]Could c[JS1.2]onsider restarting metolazone and K supplement[JS1.1] in the future if needed to augment oral diuretics[JS1.2]       # Mild troponin elevation  Suspect demand ischemia in the setting of decompensated CHF. No EKG changes. No symptoms of ACS.    --- CHRONIC ISSUES ---  # A fib: carvedilol, digoxin, pharmacy to dose warfarin  # hypothyroid: Levothyroxine  # Anemia, baseline 9-10.  Stable.  Continue home iron  # GERD: H2 blocker  #HLD: statin     FEN: 2g Na and 2L fluid diet.   Prophy: On anticoagulation  Code Status: DNR/DNI.      Disposition Plan[JS1.1]   Anticipate 1-2 additional days inpatient for ongoing diuresis. Possible TCU Sunday or Monday pending placement.[JS1.2]     [JS1.1]  Discussed with Dr. Jay.[JS1.2]     Malcolm Covarrubias MD  PGY-3 Internal Medicine     Interval History   RN notes reviewed. SANGEETHA overnight. Reports improved breathing.[JS1.1] Still feeling weak and dyspneic with exertion.[JS1.2] No new concerns.     Physical Exam   Temp: 98  F (36.7  C) Temp src: Oral BP: 111/56 Pulse: 82 Heart Rate: 82 Resp: 18 SpO2: 94 % O2 Device: None (Room air) Oxygen Delivery: 1 LPM  Vitals:    04/11/18 2245 04/12/18 0435 04/13/18 0122   Weight: 71.9 kg (158 lb 9.6 oz) 70.7 kg (155 lb 13.8 oz) 70.5 kg (155 lb 6.4 oz)     Vital Signs with Ranges  Temp:  [97.4  F (36.3  C)-99  F (37.2  C)] 98  F (36.7  C)  Pulse:  [82-84] 82  Heart Rate:  [69-83] 82  Resp:  [18-20] 18  BP: (111-122)/(47-65) 111/56  SpO2:  [92 %-96 %] 94 %  I/O last 3 completed shifts:  In: 820  [P.O.:820]  Out: 1200 [Urine:600; Other:600]    Constitutional: No apparent distress  Eyes: Non icteric   Respiratory: CTAB  Cardiovascular: RRR, normal S1 S2, soft systolic murmur present   GI: NABS, soft, non tender  Skin: Warm and dry   Ext: WWP, improved edema      Medications     - MEDICATION INSTRUCTIONS -       - MEDICATION INSTRUCTIONS -       - MEDICATION INSTRUCTIONS -       Warfarin Therapy Reminder         furosemide  60 mg Intravenous TID     warfarin  5 mg Oral ONCE at 18:00     carvedilol  25 mg Oral BID     ferrous sulfate  325 mg Oral Daily     levothyroxine  125 mcg Oral Daily     digoxin  125 mcg Oral Q48H     atorvastatin  80 mg Oral At Bedtime     famotidine  20 mg Oral Daily       Data[JS1.1]   Results for orders placed or performed during the hospital encounter of 04/11/18 (from the past 24 hour(s))   Magnesium   Result Value Ref Range    Magnesium 2.1 1.6 - 2.3 mg/dL   Potassium   Result Value Ref Range    Potassium 3.6 3.4 - 5.3 mmol/L   Digoxin level   Result Value Ref Range    Digoxin Level 0.4 (L) 0.5 - 2.0 ug/L   INR   Result Value Ref Range    INR 2.12 (H) 0.86 - 1.14   Basic metabolic panel   Result Value Ref Range    Sodium 142 133 - 144 mmol/L    Potassium 3.7 3.4 - 5.3 mmol/L    Chloride 108 94 - 109 mmol/L    Carbon Dioxide 28 20 - 32 mmol/L    Anion Gap 6 3 - 14 mmol/L    Glucose 119 (H) 70 - 99 mg/dL    Urea Nitrogen 48 (H) 7 - 30 mg/dL    Creatinine 1.37 (H) 0.52 - 1.04 mg/dL    GFR Estimate 36 (L) >60 mL/min/1.7m2    GFR Estimate If Black 44 (L) >60 mL/min/1.7m2    Calcium 8.9 8.5 - 10.1 mg/dL   Magnesium   Result Value Ref Range    Magnesium 2.1 1.6 - 2.3 mg/dL   CBC with platelets differential   Result Value Ref Range    WBC 4.3 4.0 - 11.0 10e9/L    RBC Count 3.23 (L) 3.8 - 5.2 10e12/L    Hemoglobin 8.9 (L) 11.7 - 15.7 g/dL    Hematocrit 30.0 (L) 35.0 - 47.0 %    MCV 93 78 - 100 fl    MCH 27.6 26.5 - 33.0 pg    MCHC 29.7 (L) 31.5 - 36.5 g/dL    RDW 16.7 (H) 10.0 - 15.0 %     Platelet Count 139 (L) 150 - 450 10e9/L    Diff Method Automated Method     % Neutrophils 64.5 %    % Lymphocytes 26.0 %    % Monocytes 5.8 %    % Eosinophils 3.0 %    % Basophils 0.5 %    % Immature Granulocytes 0.2 %    Nucleated RBCs 0 0 /100    Absolute Neutrophil 2.8 1.6 - 8.3 10e9/L    Absolute Lymphocytes 1.1 0.8 - 5.3 10e9/L    Absolute Monocytes 0.3 0.0 - 1.3 10e9/L    Absolute Eosinophils 0.1 0.0 - 0.7 10e9/L    Absolute Basophils 0.0 0.0 - 0.2 10e9/L    Abs Immature Granulocytes 0.0 0 - 0.4 10e9/L    Absolute Nucleated RBC 0.0[JS1.3]        Staff Physician Comments:     I personally interviewed and examined Jayne Nino today, and agree with residents assessment and plan as documented above.      Sena Jay MD     [place] [address]    Clinic nurse:  Yaya Campoverde LPN   Nurse Care Coordinator- Heart Care   [phone] option 1, than option 3    [phone] Appointments  [phone] Fax  [phone] After hours[GS1.1]         Revision History        User Key Date/Time User Provider Type Action    > GS1.1 4/13/2018  9:26 PM Sena Jay MD Physician Sign     JS1.2 4/13/2018  4:01 PM Malcolm Covarrubias MD Resident Sign     JS1.3 4/13/2018 11:56 AM Malcolm Covarrubias MD Resident Sign     JS1.1 4/13/2018 11:52 AM Malcolm Covarrubias MD Resident             Progress Notes by Sena Jay MD at 4/12/2018  9:40 AM     Author:  Sena Jay MD Service:  Cardiology Author Type:  Physician    Filed:  4/13/2018  9:25 PM Date of Service:  4/12/2018  9:40 AM Creation Time:  4/12/2018  9:40 AM    Status:  Signed :  Sena Jay MD (Physician)         [facility]    Cardiology Progress Note     Assessment & Plan   Jayne A Grendahl is 89 year old female with a history of atrial fibrillation,  HFpEF, HTN, and bioprosthetic aortic valve, mild/mod MR/TR, and CKD[JS1.1] who[JS1.2] presents with[JS1.1] acute hypoxemic respiratory failufe likely secondary to decompensated diastolic heart failure[JS1.2].     #[JS1.1] Acute hypoxemic r[JS1.2]espiratory failure[JS1.1]  # Acute on chronic diastolic heart failure[JS1.2]   # Aortic stenosis, s/p 21mm Snowden bioprosthetic valve in 2011, with increasing mean gradient  Possibly from discontinued metolazone 1 month ag[JS1.1]o with subsequent weight gain[JS1.2].  Could also be from worsening AS.  -[JS1.1] TTE today[JS1.2] to eval AV and for CHF[JS1.3]   -[JS1.1] IV lasix 40 mg TID ordered ([JS1.2]home 40 BID[JS1.1] PO[JS1.2] lasix[JS1.1])    -[JS1.2] At discharge,[JS1.1] may[JS1.3] consider restarting metolazone and K supplement    [JS1.1]  # Mild troponin elevation  Suspect demand ischemia in the setting of decompensated CHF. No EKG changes. No symptoms of ACS,[JS1.4]     --- CHRONIC ISSUES ---  # A fib: carvedilol, digoxin, pharmacy to dose warfarin[JS1.1], dig level pending[JS1.2]   # hypothyroid: Levothyroxine  # Anemia, baseline 9-10.  Stable.  Continue home iron  # GERD: H2 blocker  #HLD: statin     FEN: 2g Na and 2L fluid diet.   Prophy: On anticoagulation  Code Status: DNR/DNI.      Disposition Plan   Expected discharge: 2 - 3 days, recommended to prior living arrangement once patient is back on room air and PT/OT recs are in.[JS1.1] She currently lives with her son locally.[JS1.3]      Malcolm Covarrubias[JS1.1], MD  PGY-3 Internal Medicine[JS1.2]     Interval History[JS1.1]   RN notes reviewed. SANGEETHA overnight. Continues to be dyspneic with lying flat and exertion. No productive cough or fever. No[JS1.2] other[JS1.3] new concerns.[JS1.2]     Physical Exam   Temp: 98.1  F (36.7  C) Temp src: Oral BP: 108/54 Pulse: 96 Heart Rate: 87 Resp: 20 SpO2: 94 % O2 Device: Nasal cannula Oxygen Delivery: 2 LPM  Vitals:    04/11/18 2245 04/12/18 0435   Weight: 71.9 kg (158  lb 9.6 oz) 70.7 kg (155 lb 13.8 oz)     Vital Signs with Ranges[JS1.1]  Temp:  [97.5  F (36.4  C)-98.1  F (36.7  C)] 98.1  F (36.7  C)  Pulse:  [96] 96  Heart Rate:  [80-98] 87  Resp:  [18-28] 20  BP: (108-151)/(54-91) 108/54  SpO2:  [87 %-100 %] 94 %[JS1.5]  I/O last 3 completed shifts:  In: 500 [P.O.:500]  Out: 1100 [Urine:1100][JS1.1]    Constitutional:[JS1.2] No apparent distress[JS1.3]  Eyes:[JS1.2] Non icteric[JS1.3]   Respiratory:[JS1.2] Bibasilar crackles with faint expiratory wheezes present, otherwise CTAB[JS1.3]  Cardiovascular:[JS1.2] RRR, normal S1 S2, soft systolic murmur present[JS1.3]   GI:[JS1.2] NABS, soft, non tender[JS1.3]  Skin:[JS1.2] Warm and dry   Ext: WWP, bilateral trace pedal and ankle edema[JS1.3]     Medications     - MEDICATION INSTRUCTIONS -       - MEDICATION INSTRUCTIONS -       - MEDICATION INSTRUCTIONS -       Warfarin Therapy Reminder         carvedilol  25 mg Oral BID     famotidine  20 mg Oral BID     ferrous sulfate  325 mg Oral Daily     levothyroxine  125 mcg Oral Daily     [START ON 4/13/2018] digoxin  125 mcg Oral Q48H     atorvastatin  80 mg Oral At Bedtime     furosemide  40 mg Intravenous TID       Data[JS1.1]   Results for orders placed or performed during the hospital encounter of 04/11/18 (from the past 24 hour(s))   EKG 12 lead   Result Value Ref Range    Interpretation ECG Click View Image link to view waveform and result    CBC with platelets differential   Result Value Ref Range    WBC 4.0 4.0 - 11.0 10e9/L    RBC Count 3.65 (L) 3.8 - 5.2 10e12/L    Hemoglobin 10.2 (L) 11.7 - 15.7 g/dL    Hematocrit 33.6 (L) 35.0 - 47.0 %    MCV 92 78 - 100 fl    MCH 27.9 26.5 - 33.0 pg    MCHC 30.4 (L) 31.5 - 36.5 g/dL    RDW 16.3 (H) 10.0 - 15.0 %    Platelet Count 185 150 - 450 10e9/L    Diff Method Automated Method     % Neutrophils 65.0 %    % Lymphocytes 21.1 %    % Monocytes 7.3 %    % Eosinophils 5.8 %    % Basophils 0.5 %    % Immature Granulocytes 0.3 %    Nucleated RBCs  0 0 /100    Absolute Neutrophil 2.6 1.6 - 8.3 10e9/L    Absolute Lymphocytes 0.8 0.8 - 5.3 10e9/L    Absolute Monocytes 0.3 0.0 - 1.3 10e9/L    Absolute Eosinophils 0.2 0.0 - 0.7 10e9/L    Absolute Basophils 0.0 0.0 - 0.2 10e9/L    Abs Immature Granulocytes 0.0 0 - 0.4 10e9/L    Absolute Nucleated RBC 0.0    INR   Result Value Ref Range    INR 2.92 (H) 0.86 - 1.14   Troponin I   Result Value Ref Range    Troponin I ES 0.061 (H) 0.000 - 0.045 ug/L   Comprehensive metabolic panel   Result Value Ref Range    Sodium 145 (H) 133 - 144 mmol/L    Potassium 3.7 3.4 - 5.3 mmol/L    Chloride 107 94 - 109 mmol/L    Carbon Dioxide 29 20 - 32 mmol/L    Anion Gap 9 3 - 14 mmol/L    Glucose 123 (H) 70 - 99 mg/dL    Urea Nitrogen 38 (H) 7 - 30 mg/dL    Creatinine 1.42 (H) 0.52 - 1.04 mg/dL    GFR Estimate 35 (L) >60 mL/min/1.7m2    GFR Estimate If Black 42 (L) >60 mL/min/1.7m2    Calcium 9.4 8.5 - 10.1 mg/dL    Bilirubin Total 1.0 0.2 - 1.3 mg/dL    Albumin 3.7 3.4 - 5.0 g/dL    Protein Total 7.1 6.8 - 8.8 g/dL    Alkaline Phosphatase 93 40 - 150 U/L    ALT 17 0 - 50 U/L    AST 13 0 - 45 U/L   Nt probnp inpatient (BNP)   Result Value Ref Range    N-Terminal Pro BNP Inpatient 97598 (H) 0 - 1800 pg/mL   XR Chest 2 Views    Narrative    CHEST TWO VIEW 4/11/2018 7:02 PM     HISTORY: Shortness of breath, crackles.      COMPARISON: Chest x-ray 11/5/2016.      Impression    IMPRESSION: Two views of the chest are performed. Heart size is within  normal limits. Median sternotomy changes are noted. Patient is status  post aortic valve replacement. Mild interstitial lung changes are  present with probable atelectasis at the left lung base which is  stable. No evidence of pneumothorax or significant pleural effusions.    SATYA SAHNI MD   Uric acid   Result Value Ref Range    Uric Acid 8.4 (H) 2.6 - 6.0 mg/dL   Troponin I   Result Value Ref Range    Troponin I ES 0.053 (H) 0.000 - 0.045 ug/L   INR   Result Value Ref Range    INR 2.47 (H) 0.86 -  1.14   Basic metabolic panel   Result Value Ref Range    Sodium 147 (H) 133 - 144 mmol/L    Potassium 3.4 3.4 - 5.3 mmol/L    Chloride 110 (H) 94 - 109 mmol/L    Carbon Dioxide 26 20 - 32 mmol/L    Anion Gap 11 3 - 14 mmol/L    Glucose 106 (H) 70 - 99 mg/dL    Urea Nitrogen 35 (H) 7 - 30 mg/dL    Creatinine 1.31 (H) 0.52 - 1.04 mg/dL    GFR Estimate 38 (L) >60 mL/min/1.7m2    GFR Estimate If Black 46 (L) >60 mL/min/1.7m2    Calcium 9.4 8.5 - 10.1 mg/dL   Magnesium   Result Value Ref Range    Magnesium 2.3 1.6 - 2.3 mg/dL[JS1.2]     Staff Physician Comments:     I personally interviewed and examined Jayne Nino today, and agree with residents assessment and plan as documented above.      Sena Jay MD     [place] [address]    Clinic nurse:  Yaya Campoverde LPN   Nurse Care Coordinator- Heart Care   [phone] option 1, than option 3    [phone] Appointments  [phone] Fax  [phone] After hours[GS1.1]         Revision History        User Key Date/Time User Provider Type Action    > GS1.1 4/13/2018  9:25 PM Sena Jya MD Physician Sign     JS1.4 4/12/2018 12:59 PM Malcolm Covarrubias MD Resident Sign     JS1.3 4/12/2018 12:55 PM Malcolm Covarrubias MD Resident Sign     JS1.5 4/12/2018  9:47 AM Malcolm Covarrubias MD Resident      JS1.2 4/12/2018  9:43 AM Malcolm Covarrubias MD Resident      JS1.1 4/12/2018  9:40 AM Malcolm Covarrubias MD Resident             Progress Notes by Leidy Crane MSW at 4/13/2018  4:34 PM     Author:  Leidy Crane MSW Service:  Social Work Author Type:      Filed:  4/13/2018  4:37 PM Date of Service:  4/13/2018  4:34 PM Creation Time:  4/13/2018  4:34 PM    Status:  Signed :  Leidy Crane MSW ()         Social Work: Assessment with Discharge Plan    Patient  Name:  Jayne Brown  :  1927  Age:  89 year old  MRN:  [id number]  Risk/Complexity Score:  Filed Complexity Screen Score: 6  Completed assessment with:  Pt, higinio Zavala    Presenting Information   Reason for Referral:  Discharge plan  Date of Intake:  2018  Referral Source:  Chart Review  Decision Maker:  Pt when able  Alternate Decision Maker:  Higinio Zavala  Health Care Directive:  Will bring in copy  Living Situation:  House  Previous Functional Status:  Independent  Patient and family understanding of hospitalization:  Pt states that she is open to rehab to get stronger before going home  Cultural/Language/Spiritual Considerations:  No needs reported.  Adjustment to Illness:  Pt and son adjusting well.    Physical Health  Reason for Admission:    1. Heart failure with preserved ejection fraction (H)      Services Needed/Recommended:  TCU    Mental Health/Chemical Dependency  Diagnosis:  None reported.  Support/Services in Place:  None  Services Needed/Recommended:  None    Support System  Significant relationship at present time:  Higinio zavala  Family of origin is available for support:  Yes  Other support available:  Yes  Gaps in support system:  Pt requiring rehab prior to returning to home.  Patient is caregiver to:  None     Provider Information   Primary Care Physician:  Dinora Jimenez   [phone]   Clinic:  [facility] [address] / [facility] *      :  None    Financial   Income Source:  Retired  Financial Concerns:  Pt states she cannot afford private room costs.  Insurance:    Payor/Plan Subscriber Name Rel Member # Group #   MEDICARE - MEDICARE F* GERRI BROWN*  [id number]       ATTN CLAIMS, [address]   [facility] - HEAL* TONG BROWNIN*  [id number]      [address]       Discharge Plan   Patient and family discharge goal:  TCU  Provided education on discharge plan:  YES  Patient agreeable to discharge plan:  YES  A list of Medicare  Certified Facilities was provided to the patient and/or family to encourage patient choice. Patient's choices for facility are:    [facility]  PH: [phone]  F: [phone]    Will NH provide Skilled rehabilitation or complex medical:  YES  General information regarding anticipated insurance coverage and possible out of pocket cost was discussed. Patient and patient's family are aware patient may incur the cost of transportation to the facility, pending insurance payment: YES  Barriers to discharge:  Medical stability    Discharge Recommendations   Anticipated Disposition:  Facility:  TCU  Transportation Needs:  Family:  Higinio Mayers to transport on Sunday.  Pt needs to arrive at [facility] at 2 pm.  Name of Transportation Company and Phone:  Son Talib.    Additional comments   SW met with pt and family to introduce self and role in consult.  Pt and son in agreement with placement to [facility].  SW confirmed referral for double room on Sunday.  Pt will need to arrive by 2 pm.  Weekend team to assist with discharge.    ARIANNA Brownlee, APSW  [address] Unit   Phone: [phone]  Pager: [phone]  Unit: [phone][LH1.1]         Revision History        User Key Date/Time User Provider Type Action    > LH1.1 4/13/2018  4:37 PM Leidy Crane, MSW  Sign            Progress Notes by Radha Sanford PT at 4/13/2018  2:27 PM     Author:  Radha Sanford PT Service:  (none) Author Type:  Physical Therapist    Filed:  4/13/2018  2:27 PM Date of Service:  4/13/2018  2:27 PM Creation Time:  4/13/2018  2:27 PM    Status:  Signed :  Radha Sanford PT (Physical Therapist)          04/13/18 1300   Quick Adds   Type of Visit Initial PT Evaluation   Living Environment   Lives With child(sarah), adult  (Son)   Living Arrangements house   Home Accessibility stairs to enter home;tub/shower is not walk in   Number of Stairs to Enter Home 5  (Rail on L going up)   Number of Stairs Within  "Home (To basement, however, does not use)   Stair Railings at Home none   Transportation Available car;public transportation  ([employer] rides or son provides)   Living Environment Comment Pt lives in 2 story home with her adult son. Pt has 5 stairs to enter the home with a railing on the L going up. Pt has had difficuly with the stairs recently. Pt's son takes care of any rides pt requires.   Self-Care   Dominant Hand right   Usual Activity Tolerance moderate   Current Activity Tolerance fair   Regular Exercise no   Equipment Currently Used at Home walker, rolling;grab bar   Functional Level Prior   Ambulation 1-->assistive equipment   Transferring 1-->assistive equipment   Toileting 1-->assistive equipment  (grab bars)   Bathing 0-->independent   Dressing 0-->independent   Eating 0-->independent   Communication 0-->understands/communicates without difficulty   Cognition 0 - no cognition issues reported   Fall history within last six months yes   Number of times patient has fallen within last six months 1   Which of the above functional risks had a recent onset or change? ambulation;transferring   Prior Functional Level Comment Pt was previously modified independent with transfers and ambulation utilizing a FWW. Pt was independent for bathing and dressing. Pt does report difficulty with stairs and entering into her bathtub.   General Information   Onset of Illness/Injury or Date of Surgery - Date 04/11/18   Referring Physician Rachael Mock MD   Patient/Family Goals Statement Return home.   Pertinent History of Current Problem (include personal factors and/or comorbidities that impact the POC) \"Jayne Nino is 89 year old female with a history of atrial fibrillation, HFpEF, HTN, and bioprosthetic aortic valve, mild/mod MR/TR, and CKD who presents with acute hypoxemic respiratory failufe likely secondary to decompensated diastolic heart failure.\"   Precautions/Limitations fall precautions   Heart Disease Risk " "Factors Medical history;High blood pressure;Age   General Observations Pt received seated in upright chair, RN okayed session.   General Info Comments Activity: \"up ad cleo\"   Cognitive Status Examination   Orientation orientation to person, place and time   Level of Consciousness alert   Follows Commands and Answers Questions 100% of the time   Personal Safety and Judgment intact   Memory intact   Posture    Posture Forward head position;Protracted shoulders;Kyphosis   Range of Motion (ROM)   ROM Comment B LE ROM WFL.   Strength   Strength Comments B LE strength ~4/5 based on functional mobility.   Bed Mobility   Bed Mobility Comments Not assessed d/t pt in upright chair upon session initiation.   Transfer Skills   Transfer Comments Pt transferred sit<>stand up to FWW at [facility]   Gait   Gait Comments Pt ambulated ~125ft 4x with FWW at [facility] with seated rest breaks in b/w bouts to relieve SOB and B UE pain from using FWW. Pt ascended/descended 3 stairs 1x with B hands on L handrail at [facility].   Balance   Balance Comments Pt required B UE support in standing for balance.    General Therapy Interventions   Planned Therapy Interventions balance training;bed mobility training;gait training;neuromuscular re-education;ROM;strengthening;stretching;transfer training;home program guidelines;progressive activity/exercise   Clinical Impression   Criteria for Skilled Therapeutic Intervention yes, treatment indicated   PT Diagnosis Impaired Functional Mobility   Influenced by the following impairments Strength, Pain, Balance, Endurance   Functional limitations due to impairments Bed Mobility, Transfers, Gait, Stairs   Clinical Presentation Evolving/Changing   Clinical Presentation Rationale Clinical Judgement.   Clinical Decision Making (Complexity) Moderate complexity   Therapy Frequency` 5 times/week   Predicted Duration of Therapy Intervention (days/wks) 1wk   Anticipated Equipment Needs at Discharge (TBD with further " "evaluation)   Anticipated Discharge Disposition Transitional Care Facility   Risk & Benefits of therapy have been explained Yes   Patient, Family & other staff in agreement with plan of care Yes   [facility] AM-PAC TM \"6 Clicks\"   2016, Trustees of [facility], under license to [employer].  All rights reserved.   6 Clicks Short Forms Basic Mobility Inpatient Short Form   [facility] AM-PAC  \"6 Clicks\" V.2 Basic Mobility Inpatient Short Form   1. Turning from your back to your side while in a flat bed without using bedrails? 4 - None   2. Moving from lying on your back to sitting on the side of a flat bed without using bedrails? 3 - A Little   3. Moving to and from a bed to a chair (including a wheelchair)? 3 - A Little   4. Standing up from a chair using your arms (e.g., wheelchair, or bedside chair)? 3 - A Little   5. To walk in hospital room? 4 - None   6. Climbing 3-5 steps with a railing? 3 - A Little   Basic Mobility Raw Score (Score out of 24.Lower scores equate to lower levels of function) 20   Total Evaluation Time   Total Evaluation Time (Minutes) 8[CR1.1]        Revision History        User Key Date/Time User Provider Type Action    > CR1.1 4/13/2018  2:27 PM Radha Sanford PT Physical Therapist Sign                  Procedure Notes     No notes of this type exist for this encounter.         Progress Notes - Therapies (Notes from 04/13/18 through 04/16/18)      Progress Notes by Radha Sanford PT at 4/13/2018  2:27 PM     Author:  Radha Sanford PT Service:  (none) Author Type:  Physical Therapist    Filed:  4/13/2018  2:27 PM Date of Service:  4/13/2018  2:27 PM Creation Time:  4/13/2018  2:27 PM    Status:  Signed :  Radha Sanford PT (Physical Therapist)          04/13/18 1300   Quick Adds   Type of Visit Initial PT Evaluation   Living Environment   Lives With child(sarah), adult  (Son)   Living Arrangements house   Home Accessibility stairs to enter home;tub/shower is not walk " "in   Number of Stairs to Enter Home 5  (Rail on L going up)   Number of Stairs Within Home (To basement, however, does not use)   Stair Railings at Home none   Transportation Available car;public transportation  ([employer] rides or son provides)   Living Environment Comment Pt lives in 2 story home with her adult son. Pt has 5 stairs to enter the home with a railing on the L going up. Pt has had difficuly with the stairs recently. Pt's son takes care of any rides pt requires.   Self-Care   Dominant Hand right   Usual Activity Tolerance moderate   Current Activity Tolerance fair   Regular Exercise no   Equipment Currently Used at Home walker, rolling;grab bar   Functional Level Prior   Ambulation 1-->assistive equipment   Transferring 1-->assistive equipment   Toileting 1-->assistive equipment  (grab bars)   Bathing 0-->independent   Dressing 0-->independent   Eating 0-->independent   Communication 0-->understands/communicates without difficulty   Cognition 0 - no cognition issues reported   Fall history within last six months yes   Number of times patient has fallen within last six months 1   Which of the above functional risks had a recent onset or change? ambulation;transferring   Prior Functional Level Comment Pt was previously modified independent with transfers and ambulation utilizing a FWW. Pt was independent for bathing and dressing. Pt does report difficulty with stairs and entering into her bathtub.   General Information   Onset of Illness/Injury or Date of Surgery - Date 04/11/18   Referring Physician Rachael Mock MD   Patient/Family Goals Statement Return home.   Pertinent History of Current Problem (include personal factors and/or comorbidities that impact the POC) \"Jayne Nino is 89 year old female with a history of atrial fibrillation, HFpEF, HTN, and bioprosthetic aortic valve, mild/mod MR/TR, and CKD who presents with acute hypoxemic respiratory failufe likely secondary to decompensated " "diastolic heart failure.\"   Precautions/Limitations fall precautions   Heart Disease Risk Factors Medical history;High blood pressure;Age   General Observations Pt received seated in upright chair, RN okayed session.   General Info Comments Activity: \"up ad cleo\"   Cognitive Status Examination   Orientation orientation to person, place and time   Level of Consciousness alert   Follows Commands and Answers Questions 100% of the time   Personal Safety and Judgment intact   Memory intact   Posture    Posture Forward head position;Protracted shoulders;Kyphosis   Range of Motion (ROM)   ROM Comment B LE ROM WFL.   Strength   Strength Comments B LE strength ~4/5 based on functional mobility.   Bed Mobility   Bed Mobility Comments Not assessed d/t pt in upright chair upon session initiation.   Transfer Skills   Transfer Comments Pt transferred sit<>stand up to FWW at [facility]   Gait   Gait Comments Pt ambulated ~125ft 4x with FWW at [facility] with seated rest breaks in b/w bouts to relieve SOB and B UE pain from using FWW. Pt ascended/descended 3 stairs 1x with B hands on L handrail at [facility].   Balance   Balance Comments Pt required B UE support in standing for balance.    General Therapy Interventions   Planned Therapy Interventions balance training;bed mobility training;gait training;neuromuscular re-education;ROM;strengthening;stretching;transfer training;home program guidelines;progressive activity/exercise   Clinical Impression   Criteria for Skilled Therapeutic Intervention yes, treatment indicated   PT Diagnosis Impaired Functional Mobility   Influenced by the following impairments Strength, Pain, Balance, Endurance   Functional limitations due to impairments Bed Mobility, Transfers, Gait, Stairs   Clinical Presentation Evolving/Changing   Clinical Presentation Rationale Clinical Judgement.   Clinical Decision Making (Complexity) Moderate complexity   Therapy Frequency` 5 times/week   Predicted Duration of Therapy " "Intervention (days/wks) 1wk   Anticipated Equipment Needs at Discharge (TBD with further evaluation)   Anticipated Discharge Disposition Transitional Care Facility   Risk & Benefits of therapy have been explained Yes   Patient, Family & other staff in agreement with plan of care Yes   [facility] TM \"6 Clicks\"   2016, Trustees of [facility], under license to [employer].  All rights reserved.   6 Clicks Short Forms Basic Mobility Inpatient Short Form   [facility] AM-PAC  \"6 Clicks\" V.2 Basic Mobility Inpatient Short Form   1. Turning from your back to your side while in a flat bed without using bedrails? 4 - None   2. Moving from lying on your back to sitting on the side of a flat bed without using bedrails? 3 - A Little   3. Moving to and from a bed to a chair (including a wheelchair)? 3 - A Little   4. Standing up from a chair using your arms (e.g., wheelchair, or bedside chair)? 3 - A Little   5. To walk in hospital room? 4 - None   6. Climbing 3-5 steps with a railing? 3 - A Little   Basic Mobility Raw Score (Score out of 24.Lower scores equate to lower levels of function) 20   Total Evaluation Time   Total Evaluation Time (Minutes) 8[CR1.1]        Revision History        User Key Date/Time User Provider Type Action    > CR1.1 4/13/2018  2:27 PM Radha Sanford, PT Physical Therapist Sign            "

## 2018-04-11 NOTE — IP AVS SNAPSHOT
"    UNIT 6C [facility]: [phone]                                              INTERAGENCY TRANSFER FORM - PHYSICIAN ORDERS   2018                    Hospital Admission Date: 2018  ÓSCAR BROWN   : 1927  Sex: Female        Attending Provider: Sena Jay MD     Allergies:  Oxycodone    Infection:  None   Service:  CARDIOLOGY    Ht:  1.651 m (5' 5\")   Wt:  70.4 kg (155 lb 1.6 oz)   Admission Wt:  71.9 kg (158 lb 9.6 oz)    BMI:  25.81 kg/m 2   BSA:  1.8 m 2            Patient PCP Information     Provider PCP Type    MELANIE TOWNSEND General      ED Clinical Impression     Diagnosis Description Comment Added By Time Added    Heart failure with preserved ejection fraction (H) [I50.30] Heart failure with preserved ejection fraction (H) [I50.30]  Raymond Dc MD 2018  8:40 PM      Hospital Problems as of 2018              Priority Class Noted POA    Acute exacerbation of CHF (congestive heart failure) (H) Medium  2018 Yes      Non-Hospital Problems as of 2018              Priority Class Noted    Adiposity Medium  10/16/2003    Hyperlipidemia Medium  2006    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    Age-related macular degeneration Medium  2/10/2009    Benign neoplasm of eyelid Medium  2/10/2009    Pseudophakia Medium  2/10/2009    Posterior vitreous detachment Medium  2/10/2009    History of aortic valve replacement Medium  2011    Atrial fibrillation (H) Medium  10/22/2011    Anticoagulated on warfarin Medium  10/22/2011    Bradycardia Medium  10/22/2011    Back pain Medium  10/22/2011    Acute renal failure with other specified pathological lesion in kidney (H) Medium  10/22/2011    Long term current use of anticoagulant therapy Medium  2012    Dry eyes Medium  2012    Hypertension Medium  2012    History of repair of hip joint Medium  2013    Posterior capsular opacification Medium  2015    Epiretinal membrane Medium "  10/5/2015    Chronic atrial fibrillation (H) Medium  1/19/2016    Dyslipidemia Medium  5/9/2016    Knee pain Medium  8/1/2016    Spinal stenosis Medium  11/3/2016    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 Medium  11/3/2016    Hypothyroidism Medium  11/4/2016    (HFpEF) heart failure with preserved ejection fraction (H) Medium  11/4/2016    Generalized osteoarthritis Medium  11/4/2016    Cellulitis of hand Medium  2/14/2018      Code Status History     Date Active Date Inactive Code Status Order ID Comments User Context    4/13/2018  5:42 PM  DNR/DNI [id number]  Malcolm Covarrubias MD Outpatient    4/12/2018  3:54 AM 4/13/2018  5:42 PM DNR/DNI [id number]  Rachael Mock MD Inpatient    2/14/2018  3:07 AM 2/16/2018  4:29 PM Full Code [id number]  Kishan Finn MD Inpatient    11/7/2016  9:02 AM 2/14/2018  3:07 AM Full Code [id number]  Tristan Eduardo DO Outpatient    11/4/2016 11:14 PM 11/7/2016  9:02 AM Full Code [id number]  Erendira Carter MD Inpatient    10/22/2011 11:44 AM 11/4/2016 11:14 PM Full Code [id number]  Edwin Bedoya MD Outpatient    10/22/2011  3:21 AM 10/22/2011 11:44 AM Full Code [id number]  Amanda Plummer Inpatient         Medication Review      START taking        Dose / Directions Comments    * bumetanide 2 MG tablet   Commonly known as:  BUMEX   Used for:  Heart failure with preserved ejection fraction (H)        Dose:  2 mg   Take 1 tablet (2 mg) by mouth daily   Quantity:  60 tablet   Refills:  0        * bumetanide 1 MG tablet   Commonly known as:  BUMEX   Used for:  Heart failure with preserved ejection fraction (H)        Dose:  2 mg   Take 2 tablets (2 mg) by mouth daily as needed   Quantity:  180 tablet   Refills:  1    Please take an afternoon bumex as needed for 5 lb weight gain       metolazone 2.5 MG tablet   Commonly known as:  ZAROXOLYN   Used for:  Heart failure with preserved ejection fraction (H)        Dose:  2.5 mg   Take 1 tablet (2.5 mg) by mouth  daily as needed   Quantity:  90 tablet   Refills:  1    For 10 lb weight gain take with morning bumex       * Notice:  This list has 2 medication(s) that are the same as other medications prescribed for you. Read the directions carefully, and ask your doctor or other care provider to review them with you.      CONTINUE these medications which have NOT CHANGED        Dose / Directions Comments    acetaminophen 325 MG tablet   Commonly known as:  TYLENOL        Dose:  325-650 mg   Take 325-650 mg by mouth every 4 hours as needed   Quantity:  250 tablet   Refills:  0        ALLOPURINOL PO        Dose:  100 mg   Take 100 mg by mouth daily   Refills:  0        atorvastatin 80 MG tablet   Commonly known as:  LIPITOR        Dose:  80 mg   Take 80 mg by mouth At Bedtime   Refills:  0        COREG 25 MG tablet   Generic drug:  carvedilol        Dose:  25 mg   Take 25 mg by mouth 2 times daily   Refills:  0        DIGOXIN PO        Dose:  125 mcg   Take 125 mcg by mouth every 48 hours   Refills:  0        famotidine 20 MG tablet   Commonly known as:  PEPCID        Dose:  20 mg   Take 20 mg by mouth daily   Refills:  0        ferrous sulfate 140 (45 Fe) MG Tbcr CR tablet        Dose:  140 mg   Take 140 mg by mouth daily   Refills:  0        levothyroxine 125 MCG tablet   Commonly known as:  SYNTHROID/LEVOTHROID   Used for:  Bradycardia        Dose:  125 mcg   Take 1 tablet by mouth daily.   Quantity:  90 tablet   Refills:  1        MULTIVITAMIN ADULT PO        Dose:  1 tablet   Take 1 tablet by mouth daily   Refills:  0        polyethylene glycol 0.4%- propylene glycol 0.3% 0.4-0.3 % Soln ophthalmic solution   Commonly known as:  SYSTANE ULTRA        Dose:  1 drop   Place 1 drop into both eyes every hour as needed for dry eyes   Refills:  0        VITAMIN D (CHOLECALCIFEROL) PO        Dose:  1 tablet   Take 1 tablet by mouth daily   Refills:  0        warfarin 5 MG tablet   Commonly known as:  COUMADIN   Indication:  atrial  fibrillation        Dose:  2.5-5 mg   Take 2.5-5 mg by mouth daily Take 2.5 mg on Tuesday and Sundays, and 5 mg ROW   Refills:  0          STOP taking     FUROSEMIDE PO                   Summary of Visit     Reason for your hospital stay       You were admitted for a flare of your heart failure with fluid weight gain. Your breathing improved with doses of IV diuretics (water pills). You were weaned off of oxygen and symptomatically felt much better. You will continue to rehabilitate at the TCU upon discharge.             After Care     Activity - Up ad cleo           Advance Diet as Tolerated       Follow this diet upon discharge: Orders Placed This Encounter      Fluid restriction 2000 ML FLUID      2 Gram Sodium Diet       Daily weights       Call Provider for weight gain of more than 2 pounds per day or 5 pounds per week.       General info for SNF       Length of Stay Estimate: Short Term Care: Estimated # of Days <30  Condition at Discharge: Improving  Level of care:skilled   Rehabilitation Potential: Good  Admission H&P remains valid and up-to-date: Yes  Recent Chemotherapy: N/A  Use Nursing Home Standing Orders: Yes       Mantoux instructions       Give two-step Mantoux (PPD) Per Facility Policy Yes             Referrals     Occupational Therapy Adult Consult       Evaluate and treat as clinically indicated.    Reason:  Deconditioning, inability to perform ADLs       Physical Therapy Adult Consult       Evaluate and treat as clinically indicated.    Reason:  Deconditioning             Follow-Up Appointment Instructions     Future Labs/Procedures    Follow Up and recommended labs and tests     Comments:    Follow up with [facility] physician.  The following labs/tests are recommended: BMP and INR.      Follow-Up Appointment Instructions     Follow Up and recommended labs and tests       Follow up with [facility] physician.  The following labs/tests are recommended: BMP and INR.             Statement of  Approval     Ordered          04/16/18 0837  I have reviewed and agree with all the recommendations and orders detailed in this document.  EFFECTIVE NOW     Approved and electronically signed by:  Malcolm Covarrubias MD           04/15/18 0742  I have reviewed and agree with all the recommendations and orders detailed in this document.  EFFECTIVE NOW     Approved and electronically signed by:  Malcolm Covarrubias MD

## 2018-04-12 ENCOUNTER — APPOINTMENT (OUTPATIENT)
Dept: OCCUPATIONAL THERAPY | Facility: CLINIC | Age: 83
DRG: 291 | End: 2018-04-12
Attending: INTERNAL MEDICINE
Payer: MEDICARE

## 2018-04-12 ENCOUNTER — APPOINTMENT (OUTPATIENT)
Dept: CARDIOLOGY | Facility: CLINIC | Age: 83
DRG: 291 | End: 2018-04-12
Attending: INTERNAL MEDICINE
Payer: MEDICARE

## 2018-04-12 PROBLEM — I50.9 ACUTE EXACERBATION OF CHF (CONGESTIVE HEART FAILURE) (H): Status: ACTIVE | Noted: 2018-04-12

## 2018-04-12 LAB
ANION GAP SERPL CALCULATED.3IONS-SCNC: 11 MMOL/L (ref 3–14)
BUN SERPL-MCNC: 35 MG/DL (ref 7–30)
CALCIUM SERPL-MCNC: 9.4 MG/DL (ref 8.5–10.1)
CHLORIDE SERPL-SCNC: 110 MMOL/L (ref 94–109)
CO2 SERPL-SCNC: 26 MMOL/L (ref 20–32)
CREAT SERPL-MCNC: 1.31 MG/DL (ref 0.52–1.04)
GFR SERPL CREATININE-BSD FRML MDRD: 38 ML/MIN/1.7M2
GLUCOSE SERPL-MCNC: 106 MG/DL (ref 70–99)
INR PPP: 2.47 (ref 0.86–1.14)
INTERPRETATION ECG - MUSE: NORMAL
MAGNESIUM SERPL-MCNC: 2.1 MG/DL (ref 1.6–2.3)
MAGNESIUM SERPL-MCNC: 2.3 MG/DL (ref 1.6–2.3)
POTASSIUM SERPL-SCNC: 3.4 MMOL/L (ref 3.4–5.3)
POTASSIUM SERPL-SCNC: 3.6 MMOL/L (ref 3.4–5.3)
SODIUM SERPL-SCNC: 147 MMOL/L (ref 133–144)
TROPONIN I SERPL-MCNC: 0.05 UG/L (ref 0–0.04)
URATE SERPL-MCNC: 8.4 MG/DL (ref 2.6–6)

## 2018-04-12 PROCEDURE — 25000132 ZZH RX MED GY IP 250 OP 250 PS 637: Mod: GY | Performed by: INTERNAL MEDICINE

## 2018-04-12 PROCEDURE — 85610 PROTHROMBIN TIME: CPT | Performed by: INTERNAL MEDICINE

## 2018-04-12 PROCEDURE — 40000133 ZZH STATISTIC OT WARD VISIT: Performed by: OCCUPATIONAL THERAPIST

## 2018-04-12 PROCEDURE — 36415 COLL VENOUS BLD VENIPUNCTURE: CPT | Performed by: INTERNAL MEDICINE

## 2018-04-12 PROCEDURE — 40000141 ZZH STATISTIC PERIPHERAL IV START W/O US GUIDANCE

## 2018-04-12 PROCEDURE — 97535 SELF CARE MNGMENT TRAINING: CPT | Mod: GO | Performed by: OCCUPATIONAL THERAPIST

## 2018-04-12 PROCEDURE — A9270 NON-COVERED ITEM OR SERVICE: HCPCS | Mod: GY | Performed by: STUDENT IN AN ORGANIZED HEALTH CARE EDUCATION/TRAINING PROGRAM

## 2018-04-12 PROCEDURE — 83735 ASSAY OF MAGNESIUM: CPT | Performed by: INTERNAL MEDICINE

## 2018-04-12 PROCEDURE — 25000132 ZZH RX MED GY IP 250 OP 250 PS 637: Mod: GY | Performed by: STUDENT IN AN ORGANIZED HEALTH CARE EDUCATION/TRAINING PROGRAM

## 2018-04-12 PROCEDURE — 93306 TTE W/DOPPLER COMPLETE: CPT

## 2018-04-12 PROCEDURE — 21400006 ZZH R&B CCU INTERMEDIATE UMMC

## 2018-04-12 PROCEDURE — 97165 OT EVAL LOW COMPLEX 30 MIN: CPT | Mod: GO | Performed by: OCCUPATIONAL THERAPIST

## 2018-04-12 PROCEDURE — 84484 ASSAY OF TROPONIN QUANT: CPT | Performed by: INTERNAL MEDICINE

## 2018-04-12 PROCEDURE — 25000128 H RX IP 250 OP 636: Performed by: INTERNAL MEDICINE

## 2018-04-12 PROCEDURE — 99221 1ST HOSP IP/OBS SF/LOW 40: CPT | Mod: 25 | Performed by: INTERNAL MEDICINE

## 2018-04-12 PROCEDURE — 84550 ASSAY OF BLOOD/URIC ACID: CPT | Performed by: INTERNAL MEDICINE

## 2018-04-12 PROCEDURE — A9270 NON-COVERED ITEM OR SERVICE: HCPCS | Mod: GY | Performed by: INTERNAL MEDICINE

## 2018-04-12 PROCEDURE — 80048 BASIC METABOLIC PNL TOTAL CA: CPT | Performed by: INTERNAL MEDICINE

## 2018-04-12 PROCEDURE — 93306 TTE W/DOPPLER COMPLETE: CPT | Mod: 26 | Performed by: INTERNAL MEDICINE

## 2018-04-12 PROCEDURE — 40000802 ZZH SITE CHECK

## 2018-04-12 PROCEDURE — 84132 ASSAY OF SERUM POTASSIUM: CPT | Performed by: INTERNAL MEDICINE

## 2018-04-12 RX ORDER — FERROUS SULFATE 325(65) MG
325 TABLET ORAL DAILY
Status: DISCONTINUED | OUTPATIENT
Start: 2018-04-12 | End: 2018-04-16 | Stop reason: HOSPADM

## 2018-04-12 RX ORDER — ACETAMINOPHEN 325 MG/1
325-650 TABLET ORAL EVERY 4 HOURS PRN
Status: DISCONTINUED | OUTPATIENT
Start: 2018-04-12 | End: 2018-04-16 | Stop reason: HOSPADM

## 2018-04-12 RX ORDER — FAMOTIDINE 20 MG/1
20 TABLET, FILM COATED ORAL 2 TIMES DAILY
Status: DISCONTINUED | OUTPATIENT
Start: 2018-04-12 | End: 2018-04-12

## 2018-04-12 RX ORDER — DIGOXIN 125 MCG
125 TABLET ORAL
Status: DISCONTINUED | OUTPATIENT
Start: 2018-04-13 | End: 2018-04-16 | Stop reason: HOSPADM

## 2018-04-12 RX ORDER — ATORVASTATIN CALCIUM 80 MG/1
80 TABLET, FILM COATED ORAL AT BEDTIME
Status: DISCONTINUED | OUTPATIENT
Start: 2018-04-12 | End: 2018-04-16 | Stop reason: HOSPADM

## 2018-04-12 RX ORDER — MAGNESIUM SULFATE HEPTAHYDRATE 40 MG/ML
4 INJECTION, SOLUTION INTRAVENOUS EVERY 4 HOURS PRN
Status: DISCONTINUED | OUTPATIENT
Start: 2018-04-12 | End: 2018-04-16 | Stop reason: HOSPADM

## 2018-04-12 RX ORDER — POTASSIUM CL/LIDO/0.9 % NACL 10MEQ/0.1L
10 INTRAVENOUS SOLUTION, PIGGYBACK (ML) INTRAVENOUS
Status: DISCONTINUED | OUTPATIENT
Start: 2018-04-12 | End: 2018-04-16 | Stop reason: HOSPADM

## 2018-04-12 RX ORDER — POTASSIUM CHLORIDE 750 MG/1
20-40 TABLET, EXTENDED RELEASE ORAL
Status: DISCONTINUED | OUTPATIENT
Start: 2018-04-12 | End: 2018-04-16 | Stop reason: HOSPADM

## 2018-04-12 RX ORDER — FAMOTIDINE 20 MG/1
20 TABLET, FILM COATED ORAL DAILY
Status: DISCONTINUED | OUTPATIENT
Start: 2018-04-13 | End: 2018-04-16 | Stop reason: HOSPADM

## 2018-04-12 RX ORDER — FUROSEMIDE 10 MG/ML
40 INJECTION INTRAMUSCULAR; INTRAVENOUS 3 TIMES DAILY
Status: DISCONTINUED | OUTPATIENT
Start: 2018-04-12 | End: 2018-04-13

## 2018-04-12 RX ORDER — WARFARIN SODIUM 5 MG/1
5 TABLET ORAL
Status: COMPLETED | OUTPATIENT
Start: 2018-04-12 | End: 2018-04-12

## 2018-04-12 RX ORDER — DIMETHICONE 30 G/G
LOTION TOPICAL 2 TIMES DAILY PRN
Status: DISCONTINUED | OUTPATIENT
Start: 2018-04-12 | End: 2018-04-12 | Stop reason: RX

## 2018-04-12 RX ORDER — ATORVASTATIN CALCIUM 80 MG/1
80 TABLET, FILM COATED ORAL AT BEDTIME
Status: DISCONTINUED | OUTPATIENT
Start: 2018-04-13 | End: 2018-04-12

## 2018-04-12 RX ORDER — POTASSIUM CHLORIDE 7.45 MG/ML
10 INJECTION INTRAVENOUS
Status: DISCONTINUED | OUTPATIENT
Start: 2018-04-12 | End: 2018-04-16 | Stop reason: HOSPADM

## 2018-04-12 RX ORDER — POTASSIUM CHLORIDE 1.5 G/1.58G
20-40 POWDER, FOR SOLUTION ORAL
Status: DISCONTINUED | OUTPATIENT
Start: 2018-04-12 | End: 2018-04-16 | Stop reason: HOSPADM

## 2018-04-12 RX ORDER — ATORVASTATIN CALCIUM 80 MG/1
80 TABLET, FILM COATED ORAL AT BEDTIME
Status: DISCONTINUED | OUTPATIENT
Start: 2018-04-12 | End: 2018-04-12

## 2018-04-12 RX ORDER — POTASSIUM CHLORIDE 29.8 MG/ML
20 INJECTION INTRAVENOUS
Status: DISCONTINUED | OUTPATIENT
Start: 2018-04-12 | End: 2018-04-16 | Stop reason: HOSPADM

## 2018-04-12 RX ORDER — CARVEDILOL 25 MG/1
25 TABLET ORAL 2 TIMES DAILY
Status: DISCONTINUED | OUTPATIENT
Start: 2018-04-12 | End: 2018-04-16 | Stop reason: HOSPADM

## 2018-04-12 RX ADMIN — POTASSIUM CHLORIDE 20 MEQ: 750 TABLET, EXTENDED RELEASE ORAL at 22:24

## 2018-04-12 RX ADMIN — ATORVASTATIN CALCIUM 80 MG: 80 TABLET, FILM COATED ORAL at 21:54

## 2018-04-12 RX ADMIN — LEVOTHYROXINE SODIUM 125 MCG: 25 TABLET ORAL at 08:17

## 2018-04-12 RX ADMIN — CARVEDILOL 25 MG: 25 TABLET, FILM COATED ORAL at 19:07

## 2018-04-12 RX ADMIN — POTASSIUM CHLORIDE 20 MEQ: 1.5 POWDER, FOR SOLUTION ORAL at 04:22

## 2018-04-12 RX ADMIN — CARVEDILOL 25 MG: 25 TABLET, FILM COATED ORAL at 08:17

## 2018-04-12 RX ADMIN — WARFARIN SODIUM 5 MG: 5 TABLET ORAL at 18:09

## 2018-04-12 RX ADMIN — FUROSEMIDE 40 MG: 10 INJECTION, SOLUTION INTRAVENOUS at 13:48

## 2018-04-12 RX ADMIN — FUROSEMIDE 40 MG: 10 INJECTION, SOLUTION INTRAVENOUS at 19:07

## 2018-04-12 RX ADMIN — FERROUS SULFATE TAB 325 MG (65 MG ELEMENTAL FE) 325 MG: 325 (65 FE) TAB at 08:17

## 2018-04-12 RX ADMIN — FUROSEMIDE 40 MG: 10 INJECTION, SOLUTION INTRAVENOUS at 09:58

## 2018-04-12 RX ADMIN — FAMOTIDINE 20 MG: 20 TABLET ORAL at 08:17

## 2018-04-12 ASSESSMENT — ACTIVITIES OF DAILY LIVING (ADL): PREVIOUS_RESPONSIBILITIES: MEAL PREP;HOUSEKEEPING;MEDICATION MANAGEMENT;FINANCES

## 2018-04-12 NOTE — PLAN OF CARE
"Problem: Patient Care Overview  Goal: Plan of Care/Patient Progress Review  Outcome: No Change  Neuro: A&Ox4. up w/ standby assist w/ walker to toilet.   Resp: 2L NC sating 95%. Pt reports dyspnea upon exertion. Pt ambulates to toilet off O2 and when checking pulse ox when she is back to bed, sat 83% and SOB.  LS clear/diminished.   CV: afebrile, HR 80's w/ PVC's. 20mEq K+ packet given. BP wnl.  GI/: brief on w/ 1 urine incontience episode, otherwise uses toilet. Small BM. 2g Na and 2L fluid restriction. Pt reports,\"feeling dry and thirsty.\"  Skin:  Redness noted to bilateral LE upon arrival to unit. No changes per pt.  Access: Right arm PIV      Plan: Wean O2, diurese. Continue to monitor and notify MD bautista/ any changes.       "

## 2018-04-12 NOTE — PROGRESS NOTES
SW given message from [facility] that pt is interested in TCU.  SW can start referrals if medically indicated.  Will need to complete assessment with pt.  If pt is medically indicated and has insurance coverage, her options for placement are:    1) [facility]  PH: [phone]  F: [phone]    2) [facility]  PH: [phone]  F: [phone]    3) [facility]  PH: [phone]  F: [phone]    4) [facility]  PH: [phone]  F: [phone]      ARIANNA Brownlee, APSW  6C Unit   Phone: [phone]  Pager: [phone]  Unit: [phone]

## 2018-04-12 NOTE — PROGRESS NOTES
CLINICAL NUTRITION SERVICES    Reason for Assessment:  Low-sodium (2 g/day) nutrition education, received consult    Diet History:  RD on 2/16/18 answered pt's questions on general healthy eating.     Pt reports she has not received formal nutrition education on a low-sodium nutrition education in the past but has tried to limit the amount of sodium she eats. Does add some salt to foods or uses salt in her cooking. Admits that she does use some canned foods. Likes the use frozen vegetables and likes fruit. Aware of some of the foods that are lower in sodium.     Nutrition Diagnosis:  Food- and nutrition-related knowledge deficit r/t no previous knowledge of low-sodium diet AEB pt report of no previous formal low-sodium nutrition education.    Nutrition Prescription/Recs:  Continue low-sodium diet. Fluid restriction as per team.     Interventions:  Nutrition Education  1. Provided verbal instruction on a heart-healthy diet with emphasis on low-sodium meal planning. Rec limit added salt. Discussed recommended foods/beverages to choose and those to avoid. Answered questions.  2. Provided the following handouts: How to Read Nutrition Labels, Low-Sodium Foods and Drinks, Tips for a Low-Sodium Diet, Seasoning Your Foods Without Adding Salt, and Managing Fluid Restriction.      Goals:    Pt will verbalize at least five high sodium foods and the importance of avoiding added salt to foods for cooking or seasoning foods.     Follow-up:   Patient to ask any further nutrition-related questions before discharge.  In addition, pt may request outpatient RD appointment.     Maria Luisa Montelongo, MS, RD, LD, [facility]   6C Pgr: [phone]

## 2018-04-12 NOTE — PHARMACY-ANTICOAGULATION SERVICE
Clinical Pharmacy - Warfarin Dosing Consult     Pharmacy has been consulted to manage this patient s warfarin therapy.  Indication: Atrial Fibrillation;Bioprosthetic Heart Valve  Therapy Goal: INR 2-3  OP Anticoag Clinic: [facility], [place], [phone]  Warfarin Prior to Admission: Yes  Warfarin PTA Regimen: 2.5 mg on Tues and Sun, 5 mg ROW  Dosing Comments: Patient missed doses on 4/10 and 4/11.     INR   Date Value Ref Range Status   04/12/2018 2.47 (H) 0.86 - 1.14 Final   04/11/2018 2.92 (H) 0.86 - 1.14 Final       Recommend warfarin 5 mg today.  Pharmacy will monitor Jayne Nino daily and order warfarin doses to achieve specified goal.      Please contact pharmacy as soon as possible if the warfarin needs to be held for a procedure or if the warfarin goals change.      Carrie Mmcillan, PD4  [employer] Pharmacy Student

## 2018-04-12 NOTE — PLAN OF CARE
Problem: Patient Care Overview  Goal: Plan of Care/Patient Progress Review  AxOx4. She is hard of hearing. A Fib at baseline wotj occasional PVC. Lasix increased today to help with fluid buildup. Following her fluids restrictions. No complaints of pain. 2 liters on Nasal Cannula. Ax1 to the bathroom. Voiding frequently. No BM this shift. New PIV placed.

## 2018-04-12 NOTE — PHARMACY-ADMISSION MEDICATION HISTORY
Admission medication history interview status for the 4/11/2018 admission is complete. See Epic admission navigator for allergy information, pharmacy, prior to admission medications and immunization status.     Medication history interview sources:  Patient, [facility] ([phone]), Care Everywhere    Changes made to PTA medication list (reason)  Added: Allopurinol, multivitamin, vitamin D, digoxin  Deleted: Spironolactone  Changed: Furosemide (40 mg QD-->80 mg QD), Warfarin (clarified sig), famotidine (20 mg BID-->20 mg QD)    Additional medication history information (including reliability of information, actions taken by pharmacist):  -Patient was a moderate historian, familiar with names of medications but did not know doses. Doses verified with pharmacy.   -Patient states she takes 2 tablets of acetaminophen twice daily almost every day.   -Pharmacy last filled/Care Everywhere shows famotidine 20 mg BID, however, patient states she is only taking it once daily.   -Patient missed her warfarin dose yesterday and the day before. Last dose 4/9.  -Patient does not remember when she last took digoxin.   -Patient does not recognize spironolactone. Current use is not reflected in Care Everywhere, and [facility] has not filled it in the last 6 months.   -Patient received her influenza vaccine in September 2017.       Prior to Admission medications    Medication Sig Last Dose Taking? Auth Provider   Multiple Vitamins-Minerals (MULTIVITAMIN ADULT PO) Take 1 tablet by mouth daily 4/11/2018 at Unknown time Yes Unknown, Entered By History   VITAMIN D, CHOLECALCIFEROL, PO Take 1 tablet by mouth daily 4/11/2018 at Unknown time Yes Unknown, Entered By History   ALLOPURINOL PO Take 100 mg by mouth daily Past Week at Unknown time Yes Unknown, Entered By History   FUROSEMIDE PO Take 80 mg by mouth daily 4/11/2018 at Unknown time Yes Unknown, Entered By History   DIGOXIN PO Take 125 mcg by mouth every 48 hours Past Week at  Unknown time Yes Unknown, Entered By History   warfarin (COUMADIN) 5 MG tablet Take 2.5-5 mg by mouth daily Take 2.5 mg on Tuesday and Sundays, and 5 mg ROW 4/9/2018 at PM Yes Unknown, Entered By History   carvedilol (COREG) 25 MG tablet Take 25 mg by mouth 2 times daily 4/11/2018 at Unknown time Yes Unknown, Entered By History   ferrous sulfate 140 (45 FE) MG TBCR Take 140 mg by mouth daily Past Week at Unknown time Yes Unknown, Entered By History   atorvastatin (LIPITOR) 80 MG tablet Take 80 mg by mouth At Bedtime 4/10/2018 at Unknown time Yes Unknown, Entered By History   polyethylene glycol 0.4%- propylene glycol 0.3% (SYSTANE ULTRA) 0.4-0.3 % SOLN ophthalmic solution Place 1 drop into both eyes every hour as needed for dry eyes Past Month at Unknown time Yes Unknown, Entered By History   famotidine (PEPCID) 20 MG tablet Take 20 mg by mouth daily  4/11/2018 at Unknown time Yes Unknown, Entered By History   acetaminophen (TYLENOL) 325 MG tablet Take 325-650 mg by mouth every 4 hours as needed  4/11/2018 at Unknown time Yes Edwin Bedoya MD   levothyroxine (SYNTHROID, LEVOTHROID) 125 MCG tablet Take 1 tablet by mouth daily. 4/11/2018 at Unknown time Yes Edwin Bedoya MD     Medication history completed by: Carrie Mcmillan, PD4, [employer] Pharmacy Student

## 2018-04-12 NOTE — PROGRESS NOTES
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     # Acute hypoxemic respiratory failure  # Acute on chronic diastolic heart failure   # Aortic stenosis, s/p 21mm Snowden bioprosthetic valve in 2011, with increasing mean gradient  Possibly from discontinued metolazone 1 month ago with subsequent weight gain.  Could also be from worsening AS.  - TTE today to eval AV and for CHF   - IV lasix 40 mg TID ordered (home 40 BID PO lasix)    - At discharge, may consider restarting metolazone and K supplement      # Mild troponin elevation  Suspect demand ischemia in the setting of decompensated CHF. No EKG changes. No symptoms of ACS,     --- CHRONIC ISSUES ---  # A fib: carvedilol, digoxin, pharmacy to dose warfarin, dig level pending   # hypothyroid: Levothyroxine  # Anemia, baseline 9-10.  Stable.  Continue home iron  # GERD: H2 blocker  #HLD: statin     FEN: 2g Na and 2L fluid diet.   Prophy: On anticoagulation  Code Status: DNR/DNI.      Disposition Plan   Expected discharge: 2 - 3 days, recommended to prior living arrangement once patient is back on room air and PT/OT recs are in. She currently lives with her son locally.      Malcolm Covarrubias MD  PGY-3 Internal Medicine     Interval History   RN notes reviewed. SANGEETHA overnight. Continues to be dyspneic with lying flat and exertion. No productive cough or fever. No other new concerns.     Physical Exam   Temp: 98.1  F (36.7  C) Temp src: Oral BP: 108/54 Pulse: 96 Heart Rate: 87 Resp: 20 SpO2: 94 % O2 Device: Nasal cannula Oxygen Delivery: 2 LPM  Vitals:    04/11/18 2245 04/12/18 0435   Weight: 71.9 kg (158 lb 9.6 oz) 70.7 kg (155 lb 13.8 oz)     Vital  Signs with Ranges  Temp:  [97.5  F (36.4  C)-98.1  F (36.7  C)] 98.1  F (36.7  C)  Pulse:  [96] 96  Heart Rate:  [80-98] 87  Resp:  [18-28] 20  BP: (108-151)/(54-91) 108/54  SpO2:  [87 %-100 %] 94 %  I/O last 3 completed shifts:  In: 500 [P.O.:500]  Out: 1100 [Urine:1100]    Constitutional: No apparent distress  Eyes: Non icteric   Respiratory: Bibasilar crackles with faint expiratory wheezes present, otherwise CTAB  Cardiovascular: RRR, normal S1 S2, soft systolic murmur present   GI: NABS, soft, non tender  Skin: Warm and dry   Ext: WWP, bilateral trace pedal and ankle edema     Medications     - MEDICATION INSTRUCTIONS -       - MEDICATION INSTRUCTIONS -       - MEDICATION INSTRUCTIONS -       Warfarin Therapy Reminder         carvedilol  25 mg Oral BID     famotidine  20 mg Oral BID     ferrous sulfate  325 mg Oral Daily     levothyroxine  125 mcg Oral Daily     [START ON 4/13/2018] digoxin  125 mcg Oral Q48H     atorvastatin  80 mg Oral At Bedtime     furosemide  40 mg Intravenous TID       Data   Results for orders placed or performed during the hospital encounter of 04/11/18 (from the past 24 hour(s))   EKG 12 lead   Result Value Ref Range    Interpretation ECG Click View Image link to view waveform and result    CBC with platelets differential   Result Value Ref Range    WBC 4.0 4.0 - 11.0 10e9/L    RBC Count 3.65 (L) 3.8 - 5.2 10e12/L    Hemoglobin 10.2 (L) 11.7 - 15.7 g/dL    Hematocrit 33.6 (L) 35.0 - 47.0 %    MCV 92 78 - 100 fl    MCH 27.9 26.5 - 33.0 pg    MCHC 30.4 (L) 31.5 - 36.5 g/dL    RDW 16.3 (H) 10.0 - 15.0 %    Platelet Count 185 150 - 450 10e9/L    Diff Method Automated Method     % Neutrophils 65.0 %    % Lymphocytes 21.1 %    % Monocytes 7.3 %    % Eosinophils 5.8 %    % Basophils 0.5 %    % Immature Granulocytes 0.3 %    Nucleated RBCs 0 0 /100    Absolute Neutrophil 2.6 1.6 - 8.3 10e9/L    Absolute Lymphocytes 0.8 0.8 - 5.3 10e9/L    Absolute Monocytes 0.3 0.0 - 1.3 10e9/L    Absolute  Eosinophils 0.2 0.0 - 0.7 10e9/L    Absolute Basophils 0.0 0.0 - 0.2 10e9/L    Abs Immature Granulocytes 0.0 0 - 0.4 10e9/L    Absolute Nucleated RBC 0.0    INR   Result Value Ref Range    INR 2.92 (H) 0.86 - 1.14   Troponin I   Result Value Ref Range    Troponin I ES 0.061 (H) 0.000 - 0.045 ug/L   Comprehensive metabolic panel   Result Value Ref Range    Sodium 145 (H) 133 - 144 mmol/L    Potassium 3.7 3.4 - 5.3 mmol/L    Chloride 107 94 - 109 mmol/L    Carbon Dioxide 29 20 - 32 mmol/L    Anion Gap 9 3 - 14 mmol/L    Glucose 123 (H) 70 - 99 mg/dL    Urea Nitrogen 38 (H) 7 - 30 mg/dL    Creatinine 1.42 (H) 0.52 - 1.04 mg/dL    GFR Estimate 35 (L) >60 mL/min/1.7m2    GFR Estimate If Black 42 (L) >60 mL/min/1.7m2    Calcium 9.4 8.5 - 10.1 mg/dL    Bilirubin Total 1.0 0.2 - 1.3 mg/dL    Albumin 3.7 3.4 - 5.0 g/dL    Protein Total 7.1 6.8 - 8.8 g/dL    Alkaline Phosphatase 93 40 - 150 U/L    ALT 17 0 - 50 U/L    AST 13 0 - 45 U/L   Nt probnp inpatient (BNP)   Result Value Ref Range    N-Terminal Pro BNP Inpatient 89240 (H) 0 - 1800 pg/mL   XR Chest 2 Views    Narrative    CHEST TWO VIEW 4/11/2018 7:02 PM     HISTORY: Shortness of breath, crackles.      COMPARISON: Chest x-ray 11/5/2016.      Impression    IMPRESSION: Two views of the chest are performed. Heart size is within  normal limits. Median sternotomy changes are noted. Patient is status  post aortic valve replacement. Mild interstitial lung changes are  present with probable atelectasis at the left lung base which is  stable. No evidence of pneumothorax or significant pleural effusions.    SATYA SAHNI MD   Uric acid   Result Value Ref Range    Uric Acid 8.4 (H) 2.6 - 6.0 mg/dL   Troponin I   Result Value Ref Range    Troponin I ES 0.053 (H) 0.000 - 0.045 ug/L   INR   Result Value Ref Range    INR 2.47 (H) 0.86 - 1.14   Basic metabolic panel   Result Value Ref Range    Sodium 147 (H) 133 - 144 mmol/L    Potassium 3.4 3.4 - 5.3 mmol/L    Chloride 110 (H) 94 - 109  mmol/L    Carbon Dioxide 26 20 - 32 mmol/L    Anion Gap 11 3 - 14 mmol/L    Glucose 106 (H) 70 - 99 mg/dL    Urea Nitrogen 35 (H) 7 - 30 mg/dL    Creatinine 1.31 (H) 0.52 - 1.04 mg/dL    GFR Estimate 38 (L) >60 mL/min/1.7m2    GFR Estimate If Black 46 (L) >60 mL/min/1.7m2    Calcium 9.4 8.5 - 10.1 mg/dL   Magnesium   Result Value Ref Range    Magnesium 2.3 1.6 - 2.3 mg/dL     Staff Physician Comments:     I personally interviewed and examined Jayne Nino today, and agree with residents assessment and plan as documented above.      Sena Jay MD     [place] [address]    Clinic nurse:  Yaya Campoverde LPN   Nurse Care Coordinator- Heart Care   [phone] option 1, than option 3    [phone] Appointments  [phone] Fax  [phone] After hours

## 2018-04-12 NOTE — PROGRESS NOTES
Care Coordinator Progress Note     Admission Date/Time:  4/11/2018  Attending MD:  Sena Jay MD     Data  Chart reviewed, discussed with interdisciplinary team.   Patient was admitted for: Heart failure with preserved ejection fraction (H).  Assessment  Concerns with insurance coverage for discharge needs: None.  Current Living Situation: Patient lives with her son, Talib.  Support System: Supportive and Involved son  Services Involved: Outpt INR and warfarin monitoring through her PCP at [facility].  Transportation: Family or Friend will provide  Barriers to Discharge: Chronic illness, diuresis    Coordination of Care and Referrals: Provided patient/family with options for home care vs TCU. This writer met with pt and her son, Talib to discuss discharge planning. OT currently recommending TCU at discharge, pt asking what TCU entails and this writer gave handout on TCU vs Home Care services. Pt states she is not interested in home care, but would consider TCU and gave this writer her preferred facilities which included: [facility],  [facility], [facility] and [facility]. This writer updated SW of pt preference. Pt's son states that he would provide transportation to facility. SW will continue to follow for placement.    Plan  Anticipated Discharge Date: TBD  Anticipated Discharge Plan: Discharge to TCU  CC will continue to monitor patient's medical condition and progress towards discharge.  Pricila Garcia RN BSN  6C Unit Care Coordinator  Phone number: [phone]  Pager: [phone]

## 2018-04-12 NOTE — PROGRESS NOTES
" 04/12/18 1128   Quick Adds   Type of Visit Initial Occupational Therapy Evaluation   Living Environment   Lives With child(sarah), adult  (son)   Living Arrangements house   Home Accessibility stairs to enter home;tub/shower is not walk in   Number of Stairs to Enter Home 4   Number of Stairs Within Home (to basement, but does not use)   Stair Railings at Home none   Transportation Available car;family or friend will provide   Living Environment Comment Pt lives with son who works part time. Pt has had great difficulty getting into home with 5 steps and into claw foot tub   Self-Care   Dominant Hand right   Usual Activity Tolerance moderate   Current Activity Tolerance fair   Regular Exercise no   Equipment Currently Used at Home walker, rolling;grab bar   Activity/Exercise/Self-Care Comment Pt I in BADLs   Functional Level Prior   Ambulation 1-->assistive equipment   Transferring 1-->assistive equipment   Toileting 1-->assistive equipment   Bathing 0-->independent   Dressing 0-->independent   Eating 0-->independent   Communication 0-->understands/communicates without difficulty   Swallowing 0-->swallows foods/liquids without difficulty   Cognition 0 - no cognition issues reported   Fall history within last six months yes   Number of times patient has fallen within last six months 1   Which of the above functional risks had a recent onset or change? ambulation;transferring   General Information   Onset of Illness/Injury or Date of Surgery - Date 04/11/18   Referring Physician Rachael Mock MD   Patient/Family Goals Statement Pt would like to discharge home   Additional Occupational Profile Info/Pertinent History of Current Problem Per chart- \"Jayne Nino is 89 year old female with a history of atrial fibrillation, HFpEF, HTN, and bioprosthetic aortic valve, mild/mod MR/TR, and CKD who presents with acute hypoxemic respiratory failufe likely secondary to decompensated diastolic heart failure\" "   Precautions/Limitations fall precautions;oxygen therapy device and L/min   General Observations Pt seated in chair   General Info Comments Activity: Up ad cleo   Cognitive Status Examination   Orientation orientation to person, place and time   Level of Consciousness alert   Able to Follow Commands WNL/WFL   Personal Safety (Cognitive) WNL/WFL   Memory impaired   Cognitive Comment Pt reports mild memory deficits. Will continue to monitor and assess   Visual Perception   Visual Perception Wears glasses;No deficits were identified   Sensory Examination   Sensory Quick Adds No deficits were identified   Pain Assessment   Patient Currently in Pain No   Posture   Posture Comments mild leif LE edema   Range of Motion (ROM)   ROM Quick Adds No deficits were identified   Strength   Strength Comments generalized deconditioning noted   Hand Strength   Hand Strength Comments WFL   Muscle Tone Assessment   Muscle Tone Quick Adds No deficits were identified   Coordination   Upper Extremity Coordination No deficits were identified   Transfer Skill: Bed to Chair/Chair to Bed   Level of [place]: Bed to Chair contact guard   Physical Assist/Nonphysical Assist: Bed to Chair 1 person assist   Transfer Skill: Sit to Stand   Level of [place]: Sit/Stand contact guard   Physical Assist/Nonphysical Assist: Sit/Stand 1 person assist   Lower Body Dressing   Level of [place]: Dress Lower Body moderate assist (50% patients effort)   Physical Assist/Nonphysical Assist: Dress Lower Body 1 person assist   Grooming   Level of [place]: Grooming minimum assist (75% patients effort)   Physical Assist/Nonphysical Assist: Grooming 1 person assist   Instrumental Activities of Daily Living (IADL)   Previous Responsibilities meal prep;housekeeping;medication management;finances   IADL Comments son able to provide some assist   Activities of Daily Living Analysis   Impairments Contributing to Impaired Activities of Daily Living balance  "impaired;cognition impaired;fear and anxiety;strength decreased   General Therapy Interventions   Planned Therapy Interventions ADL retraining;IADL retraining;balance training;bed mobility training;cognition;risk factor education;progressive activity/exercise;home program guidelines;transfer training;strengthening   Clinical Impression   Criteria for Skilled Therapeutic Interventions Met yes, treatment indicated   OT Diagnosis decreased ADL I   Influenced by the following impairments medical status, strength, activity tolerance   Assessment of Occupational Performance 1-3 Performance Deficits   Identified Performance Deficits dressing, bathing, transfers   Clinical Decision Making (Complexity) Low complexity   Therapy Frequency 5 times/wk   Predicted Duration of Therapy Intervention (days/wks) 2 weeks   Anticipated Equipment Needs at Discharge (TBD)   Anticipated Discharge Disposition Transitional Care Facility   Risks and Benefits of Treatment have been explained. Yes   Patient, Family & other staff in agreement with plan of care Yes   [facility] TM \"6 Clicks\"   2016, Trustees of [facility], under license to [employer].  All rights reserved.   6 Clicks Short Forms Daily Activity Inpatient Short Form   [facility]  \"6 Clicks\" Daily Activity Inpatient Short Form   1. Putting on and taking off regular lower body clothing? 2 - A Lot   2. Bathing (including washing, rinsing, drying)? 2 - A Lot   3. Toileting, which includes using toilet, bedpan or urinal? 3 - A Little   4. Putting on and taking off regular upper body clothing? 3 - A Little   5. Taking care of personal grooming such as brushing teeth? 3 - A Little   6. Eating meals? 4 - None   Daily Activity Raw Score (Score out of 24.Lower scores equate to lower levels of function) 17   Total Evaluation Time   Total Evaluation Time (Minutes) 5     "

## 2018-04-12 NOTE — PROVIDER NOTIFICATION
Admission    D/I/A:  Admitted from: [facility]  Via: [facility] transport  Accompanied by: self  Belongings: Bag of clothing at bedside and personal walker.  Teaching: orientation to unit, call don't fall, use of console, when to call for the RN (angina/sob/dizzyness, etc.), and enforced importance of safety .  Allergy and falls precaution bands placed on patient.  Access: PIV-SL'd  Telemetry: Placed on patient; A-Fib on monitor with PVC noted.  Height/Weight: Completed in EPIC  A+O x4 and able to make needs known.  Up with SBA of one and use of walker.  VSSA.  Mild SOA noted.  O2 96% with 2 L/nc.  Denies pain.  MDs notified of patient's arrival for assessment and orders.  P: Continue to monitor status.

## 2018-04-12 NOTE — H&P
History & Physical  Service: Cards 1     Jayne Nino MRN# [id number]   YOB: 1927 Age: 89 year old      Date of Admission:  4/11/2018    Chief Complaint: Dyspnea      History of Present Illness:    Jayne Nino is 89 year old female with a history of atrial fibrillation, HFpEF, HTN, and bioprosthetic aortic valve, mild/mod MR/TR, and CKD presents with 1 week of progressive dyspnea.  She was seen by PCP in March and from that note it appears that she was taken off of K supplement and metolazone, although patient is unsure if she is still taking metolazone or not.   She says she has noticed some weight gain (dry weight ~ 145, now closer to 160), increased LE swelling, PND, and orthopnea.  She is usually limited to ADLs with a walker in her house, where she lives with her son -- and has noticed some dyspnea with ADLs as well.  She says she has been taking her meds.    She is also suffering from generalized pruritus for the last 2 weeks.  At the last PCP visit she was started on allopurinol due to elevated uric acid and hx of gout/pseudogout; she is also being tapered off prednisone.   Patient says her pruritus is better now after stopping allopurinol and using OTC emmolient (Cerave).       Denies chest pain, abd pain, dysuria.      Follows with Internal Medicine and Cardiology at [facility].       ROS: 10 point ROS is negative except per HPI    Past Medical History:  - reviewed    Past Surgical History:  - reviewed    Medications:  Reviewed    Allergies:  - reviewed    Social History:  - Lives with son.  Denies tobacco, alcohol, illicit drugs.  Walks with walker, independent with ADLs including med mgmt.     Family History:  - CAD in father/mother    Exam:  Temp:  [97.5  F (36.4  C)-97.6  F (36.4  C)] 97.5  F (36.4  C)  Pulse:  [96] 96  Heart Rate:  [80-98] 80  Resp:  [18-28] 19  BP: (130-151)/(61-91) 137/61  SpO2:  [87 %-100 %] 96 %    Intake/Output  Summary (Last 24 hours) at 04/12/18 0027  Last data filed at 04/11/18 2123   Gross per 24 hour   Intake                0 ml   Output              650 ml   Net             -650 ml       General: Alert, In mild resp distress  HEENT: Atraumatic  Neck: Supple   Resp: CTAB, no wheezes or crackles -- desats to 88% on RA, satting > 94% on 2L via NC  Cardiac: RRR, no murmur or extra heart sounds  Abdomen: Soft, nontender, nondistended. Active BS.    Extremities: mod LE edema from feet to thigh.   Skin: there are some bruising likely from anticoagulation and petechia likely from scratching.   Neuro and Psych: Alert & grossly oriented, CNS 3-12 grossly intact, moves all extremities equally  Bedside US: Normal EF, IVC 2.5 to 2.2cm with < 50% resp variability.     Labs/Imaging reviewed in the EMR.  Labs: BNP 14,338, trop 0.061, Cr 1.42 (baseline), Hb 10.2 (baseline)  Imaging: CXR with some possible mild cephalization but otherwise no florid pulm edema  EKG: RBBB and A fib (baseline)    Cardiac echo 9/2017:   CONCLUSION:    1. Normal LV size and systolic function. Severe concentric LVH.     2. Normal RV size and function.    3. Bioprosthetic aortic valve, not well seen. Mean gradient 35 mmHg     4. Mild mitral stenosis. Mild mitral regurgitation.     5. Mild tricuspid regurgitation.     6. Estimated RV systolic pressure = 50 mmHg.    7. Patient with known bioprosthetic valve dysfunction, however,     mean gradients have increased compared to prior study.       Cardiac echo 10/2016   Technically difficult exam.     Normal LV size and systolic function.     Normal RV size and function.     Severe LA dilatation. Moderate RA dilation.     S/p bioprosthetic AVR with mild central AI. Mean gradient 14 mmHg     which is improved vs. prior study.    Mild mitral regurgitation.     Mild tricuspid regurgitation. Estimated RV systolic pressure = 34     mmHg + right atrial pressure.    Compared to the prior study, the mean aortic valve  gradient and     estimated RVSP have decreased.     Left Ventricular Ejection Fraction: 65-70 %       Assessment/ Plan:  Jayne Nino is 89 year old female with a history of atrial fibrillation, HFpEF, HTN, and bioprosthetic aortic valve, mild/mod MR/TR, and CKD presents with 1 week of progressive dyspnea found to have elevated BNP and IVC without resp variability.    # Respiratory failure, on 2L oxygen  # Aortic stenosis, s/p 21mm Snowden bioprosthetic valve in 2011, with increasing mean gradient  # HFpEF  - Likely 2/2 HFpEF exacerbation.  Possibly from discontinued metolazone 1 month ago.  Could also be from worsening AS.  - Formal TTE pending  - Got 40 of lasix in the ED with good UOP.  Redose in the AM.  - Hold home 40 BID lasix.  At discharge, consider restarting metolazone and K supplements     # Generalized pruritus  - Hold allopurinol given possible itching  - Patient is unsure when she finished her prednisone course.  If has attack, can use prednisone.  - Emollient BID PRN for dry skin    --- CHRONIC ISSUES ---  # A fib: carvedilol, digoxin, pharmacy to dose warfarin  # hypothyroid: Levothyroxine  # Anemia, baseline 9-10.  Stable.  Continue home iron  # GERD: H2 blocker  #HLD: statin    FEN: 2g Na and 2L fluid diet.   Prophy: On anticoagulation  Code Status: DNR/DNI.     Disposition Plan   Expected discharge: 2 - 3 days, recommended to prior living arrangement once patient is back on room air and PT/OT recs are in..    To be staffed in the AM.  Discussed with fellow Dr. Kaminski.     Rachael Mock DO, MS  PGY-2, Internal Medicine Resident  Pager     Staff Physician Comments:     Encounter diagnosis:  1. Heart failure with preserved ejection fraction (H)          I personally interviewed and examined Jayne Nino today, and agree with residents assessment and plan as documented above.      Sena Jay MD     Division of Cardiology  [facility]  [facility] [address] [address]    Clinic nurse:  Yaya Campoverde LPN   Nurse Care Coordinator- Heart Care   [phone] option 1, than option 3    [phone] Appointments  [phone] Fax  [phone] After hours

## 2018-04-12 NOTE — PLAN OF CARE
Problem: Patient Care Overview  Goal: Plan of Care/Patient Progress Review  OT/6C:  Discharge Planner OT   Patient plan for discharge: home  Current status: Pt SBA ambulate 50 ft with walker and SBA. Pt with one standing rest break. Pt with limited activity tolerance. Mod A LB dressing  Barriers to return to prior living situation: strength, activity tolerance, mobility   Recommendations for discharge: TCU  Rationale for recommendations: pt has 5 steps to enter home- may change pending stair assessment with PT. Pt with decreased ADL I, mobility, and transfers, pt home alone for parts of the day       Entered by: Tamiko Solomon 04/12/2018 11:48 AM

## 2018-04-13 ENCOUNTER — APPOINTMENT (OUTPATIENT)
Dept: PHYSICAL THERAPY | Facility: CLINIC | Age: 83
DRG: 291 | End: 2018-04-13
Attending: INTERNAL MEDICINE
Payer: MEDICARE

## 2018-04-13 ENCOUNTER — APPOINTMENT (OUTPATIENT)
Dept: OCCUPATIONAL THERAPY | Facility: CLINIC | Age: 83
DRG: 291 | End: 2018-04-13
Payer: MEDICARE

## 2018-04-13 LAB
ANION GAP SERPL CALCULATED.3IONS-SCNC: 6 MMOL/L (ref 3–14)
BASOPHILS # BLD AUTO: 0 10E9/L (ref 0–0.2)
BASOPHILS NFR BLD AUTO: 0.5 %
BUN SERPL-MCNC: 48 MG/DL (ref 7–30)
CALCIUM SERPL-MCNC: 8.9 MG/DL (ref 8.5–10.1)
CHLORIDE SERPL-SCNC: 108 MMOL/L (ref 94–109)
CO2 SERPL-SCNC: 28 MMOL/L (ref 20–32)
CREAT SERPL-MCNC: 1.37 MG/DL (ref 0.52–1.04)
DIFFERENTIAL METHOD BLD: ABNORMAL
DIGOXIN SERPL-MCNC: 0.4 UG/L (ref 0.5–2)
EOSINOPHIL # BLD AUTO: 0.1 10E9/L (ref 0–0.7)
EOSINOPHIL NFR BLD AUTO: 3 %
ERYTHROCYTE [DISTWIDTH] IN BLOOD BY AUTOMATED COUNT: 16.7 % (ref 10–15)
GFR SERPL CREATININE-BSD FRML MDRD: 36 ML/MIN/1.7M2
GLUCOSE BLDC GLUCOMTR-MCNC: 91 MG/DL (ref 70–99)
GLUCOSE SERPL-MCNC: 119 MG/DL (ref 70–99)
HCT VFR BLD AUTO: 30 % (ref 35–47)
HGB BLD-MCNC: 8.9 G/DL (ref 11.7–15.7)
IMM GRANULOCYTES # BLD: 0 10E9/L (ref 0–0.4)
IMM GRANULOCYTES NFR BLD: 0.2 %
INR PPP: 2.12 (ref 0.86–1.14)
LYMPHOCYTES # BLD AUTO: 1.1 10E9/L (ref 0.8–5.3)
LYMPHOCYTES NFR BLD AUTO: 26 %
MAGNESIUM SERPL-MCNC: 2.1 MG/DL (ref 1.6–2.3)
MCH RBC QN AUTO: 27.6 PG (ref 26.5–33)
MCHC RBC AUTO-ENTMCNC: 29.7 G/DL (ref 31.5–36.5)
MCV RBC AUTO: 93 FL (ref 78–100)
MONOCYTES # BLD AUTO: 0.3 10E9/L (ref 0–1.3)
MONOCYTES NFR BLD AUTO: 5.8 %
NEUTROPHILS # BLD AUTO: 2.8 10E9/L (ref 1.6–8.3)
NEUTROPHILS NFR BLD AUTO: 64.5 %
NRBC # BLD AUTO: 0 10*3/UL
NRBC BLD AUTO-RTO: 0 /100
PLATELET # BLD AUTO: 139 10E9/L (ref 150–450)
POTASSIUM SERPL-SCNC: 3.7 MMOL/L (ref 3.4–5.3)
RBC # BLD AUTO: 3.23 10E12/L (ref 3.8–5.2)
SODIUM SERPL-SCNC: 142 MMOL/L (ref 133–144)
WBC # BLD AUTO: 4.3 10E9/L (ref 4–11)

## 2018-04-13 PROCEDURE — 80048 BASIC METABOLIC PNL TOTAL CA: CPT | Performed by: INTERNAL MEDICINE

## 2018-04-13 PROCEDURE — 25000132 ZZH RX MED GY IP 250 OP 250 PS 637: Mod: GY | Performed by: INTERNAL MEDICINE

## 2018-04-13 PROCEDURE — 40000133 ZZH STATISTIC OT WARD VISIT

## 2018-04-13 PROCEDURE — 85025 COMPLETE CBC W/AUTO DIFF WBC: CPT | Performed by: INTERNAL MEDICINE

## 2018-04-13 PROCEDURE — A9270 NON-COVERED ITEM OR SERVICE: HCPCS | Mod: GY | Performed by: STUDENT IN AN ORGANIZED HEALTH CARE EDUCATION/TRAINING PROGRAM

## 2018-04-13 PROCEDURE — 97110 THERAPEUTIC EXERCISES: CPT | Mod: GO

## 2018-04-13 PROCEDURE — 85610 PROTHROMBIN TIME: CPT | Performed by: INTERNAL MEDICINE

## 2018-04-13 PROCEDURE — 97530 THERAPEUTIC ACTIVITIES: CPT | Mod: GP

## 2018-04-13 PROCEDURE — 80162 ASSAY OF DIGOXIN TOTAL: CPT | Performed by: INTERNAL MEDICINE

## 2018-04-13 PROCEDURE — 25000132 ZZH RX MED GY IP 250 OP 250 PS 637: Mod: GY | Performed by: STUDENT IN AN ORGANIZED HEALTH CARE EDUCATION/TRAINING PROGRAM

## 2018-04-13 PROCEDURE — A9270 NON-COVERED ITEM OR SERVICE: HCPCS | Mod: GY | Performed by: INTERNAL MEDICINE

## 2018-04-13 PROCEDURE — 97535 SELF CARE MNGMENT TRAINING: CPT | Mod: GO

## 2018-04-13 PROCEDURE — 40000193 ZZH STATISTIC PT WARD VISIT

## 2018-04-13 PROCEDURE — 25000128 H RX IP 250 OP 636: Performed by: INTERNAL MEDICINE

## 2018-04-13 PROCEDURE — 97116 GAIT TRAINING THERAPY: CPT | Mod: GP

## 2018-04-13 PROCEDURE — 99238 HOSP IP/OBS DSCHRG MGMT 30/<: CPT | Mod: GC | Performed by: INTERNAL MEDICINE

## 2018-04-13 PROCEDURE — 83735 ASSAY OF MAGNESIUM: CPT | Performed by: INTERNAL MEDICINE

## 2018-04-13 PROCEDURE — 97162 PT EVAL MOD COMPLEX 30 MIN: CPT | Mod: GP

## 2018-04-13 PROCEDURE — 00000146 ZZHCL STATISTIC GLUCOSE BY METER IP

## 2018-04-13 PROCEDURE — 21400006 ZZH R&B CCU INTERMEDIATE UMMC

## 2018-04-13 PROCEDURE — 99233 SBSQ HOSP IP/OBS HIGH 50: CPT | Mod: GC | Performed by: INTERNAL MEDICINE

## 2018-04-13 PROCEDURE — 36415 COLL VENOUS BLD VENIPUNCTURE: CPT | Performed by: INTERNAL MEDICINE

## 2018-04-13 RX ORDER — WARFARIN SODIUM 5 MG/1
5 TABLET ORAL
Status: COMPLETED | OUTPATIENT
Start: 2018-04-13 | End: 2018-04-13

## 2018-04-13 RX ORDER — FUROSEMIDE 10 MG/ML
60 INJECTION INTRAMUSCULAR; INTRAVENOUS 3 TIMES DAILY
Status: DISCONTINUED | OUTPATIENT
Start: 2018-04-13 | End: 2018-04-14

## 2018-04-13 RX ADMIN — FERROUS SULFATE TAB 325 MG (65 MG ELEMENTAL FE) 325 MG: 325 (65 FE) TAB at 09:15

## 2018-04-13 RX ADMIN — ACETAMINOPHEN 650 MG: 325 TABLET, FILM COATED ORAL at 20:30

## 2018-04-13 RX ADMIN — LEVOTHYROXINE SODIUM 125 MCG: 25 TABLET ORAL at 09:15

## 2018-04-13 RX ADMIN — WARFARIN SODIUM 5 MG: 5 TABLET ORAL at 17:07

## 2018-04-13 RX ADMIN — FUROSEMIDE 60 MG: 10 INJECTION, SOLUTION INTRAVENOUS at 13:52

## 2018-04-13 RX ADMIN — FAMOTIDINE 20 MG: 20 TABLET, FILM COATED ORAL at 09:15

## 2018-04-13 RX ADMIN — POTASSIUM CHLORIDE 20 MEQ: 750 TABLET, EXTENDED RELEASE ORAL at 09:18

## 2018-04-13 RX ADMIN — ATORVASTATIN CALCIUM 80 MG: 80 TABLET, FILM COATED ORAL at 21:39

## 2018-04-13 RX ADMIN — CARVEDILOL 25 MG: 25 TABLET, FILM COATED ORAL at 09:16

## 2018-04-13 RX ADMIN — FUROSEMIDE 60 MG: 10 INJECTION, SOLUTION INTRAVENOUS at 20:30

## 2018-04-13 RX ADMIN — DIGOXIN 125 MCG: 125 TABLET ORAL at 09:16

## 2018-04-13 RX ADMIN — FUROSEMIDE 60 MG: 10 INJECTION, SOLUTION INTRAVENOUS at 09:27

## 2018-04-13 RX ADMIN — CARVEDILOL 25 MG: 25 TABLET, FILM COATED ORAL at 20:30

## 2018-04-13 ASSESSMENT — PAIN DESCRIPTION - DESCRIPTORS
DESCRIPTORS: ACHING
DESCRIPTORS: ACHING

## 2018-04-13 NOTE — DISCHARGE SUMMARY
[facility], [place]    Discharge Summary  Cardiology    Date of Admission:  4/11/2018  Date of Discharge:  4/16/2018  Discharging Provider: Malcolm Covarrubias    Discharge Diagnoses   1. Acute hypoxemic respiratory failure  2. Acute on chronic diastolic heart failure  3. 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 Demand ischemia   5. Paroxysmal atrial fibrillation     History of Present Illness     Per admitting physician -   Jayne Nino is 89 year old female with a history of atrial fibrillation, HFpEF, HTN, and bioprosthetic aortic valve, mild/mod MR/TR, and CKD presents with 1 week of progressive dyspnea.  She was seen by PCP in March and from that note it appears that she was taken off of K supplement and metolazone, although patient is unsure if she is still taking metolazone or not.   She says she has noticed some weight gain (dry weight ~ 145, now closer to 160), increased LE swelling, PND, and orthopnea.  She is usually limited to ADLs with a walker in her house, where she lives with her son -- and has noticed some dyspnea with ADLs as well.  She says she has been taking her meds.    Hospital Course   Jayne Nino was admitted on 4/11/2018.  The following problems were addressed during her hospitalization:    # Acute hypoxemic respiratory failure, resolved  # Acute on chronic diastolic heart failure   # Moderate aortic stenosis s/p 21mm Snowden bioprosthetic valve in 2011  Etiology of exacerbation was likely discontinuation of metolazone 1 month ago with subsequent weight gain. Worsening valvular disease was considered and TTE showed stable moderate AS but with moderate AI. Will need outpt monitoring to discuss potential need for valve in valve TAVR. Diuresed with several days of IV diuretics with good response. Transitioned to orals on day prior to dc. Weight and creatinine stable.       # Demand ischemia   Troponin elevated but  down-trended after admission. Demand ischemia in the setting of decompensated CHF. No EKG changes. No symptoms of ACS.     Discussed with staff on day of discharge.     Malcolm Covarrubias MD  PGY-3 Internal Medicine     Significant Results and Procedures   See echo in imaging below    Code Status   DNR / DNI    Primary Care Physician   MELANIE TOWNSEND    Physical Exam   Temp: 97.9  F (36.6  C) Temp src: Oral BP: 102/51 Pulse: 82 Heart Rate: 79 Resp: 20 SpO2: 96 % O2 Device: None (Room air) Oxygen Delivery: 1 LPM  Vitals:    04/11/18 2245 04/12/18 0435 04/13/18 0122   Weight: 71.9 kg (158 lb 9.6 oz) 70.7 kg (155 lb 13.8 oz) 70.5 kg (155 lb 6.4 oz)     Vital Signs with Ranges  Temp:  [97  F (36.1  C)-98.3  F (36.8  C)] 98.1  F (36.7  C)  Pulse:  [66-79] 79  Heart Rate:  [77-91] 91  Resp:  [16-20] 18  BP: ()/(49-67) 102/51  SpO2:  [94 %-98 %] 95 %  I/O last 3 completed shifts:  In: 100 [P.O.:100]  Out: 900 [Urine:900]    Constitutional: No apparent distress  Respiratory: CTAB without wheezes or crackles  Cardiovascular: RRR, normal S1 S2, MICHAEL present  GI: NABS, soft, non tender  Skin: No rashes   Musculoskeletal: Warm extremities, trace edema   Neurologic: Alert and oriented, moving all extremities     Discharge Disposition   Discharged to short-term care facility  Condition at discharge: Stable    Consultations This Hospital Stay   CARDIAC REHAB IP CONSULT  NUTRITION SERVICES ADULT IP CONSULT  PHYSICAL THERAPY ADULT IP CONSULT  OCCUPATIONAL THERAPY ADULT IP CONSULT  PHARMACY TO DOSE WARFARIN  VASCULAR ACCESS CARE ADULT IP CONSULT  PHYSICAL THERAPY ADULT IP CONSULT  OCCUPATIONAL THERAPY ADULT IP CONSULT  VASCULAR ACCESS CARE ADULT IP CONSULT  SMOKING CESSATION PROGRAM IP CONSULT    Discharge Orders     General info for SNF   Length of Stay Estimate: Short Term Care: Estimated # of Days <30  Condition at Discharge: Improving  Level of care:skilled   Rehabilitation Potential: Good  Admission H&P remains valid and  up-to-date: Yes  Recent Chemotherapy: N/A  Use Nursing Home Standing Orders: Yes     Mantoux instructions   Give two-step Mantoux (PPD) Per Facility Policy Yes     Reason for your hospital stay   You were admitted for a flare of your heart failure with fluid weight gain. Your breathing improved with doses of IV diuretics (water pills). You were weaned off of oxygen and symptomatically felt much better. You will continue to rehabilitate at the TCU upon discharge.     Daily weights   Call Provider for weight gain of more than 2 pounds per day or 5 pounds per week.     Follow Up and recommended labs and tests   Follow up with [facility] physician.  The following labs/tests are recommended: BMP and INR.     Activity - Up ad cleo     DNR/DNI     Physical Therapy Adult Consult   Evaluate and treat as clinically indicated.    Reason:  Deconditioning     Occupational Therapy Adult Consult   Evaluate and treat as clinically indicated.    Reason:  Deconditioning, inability to perform ADLs     Advance Diet as Tolerated   Follow this diet upon discharge: Orders Placed This Encounter     Fluid restriction 2000 ML FLUID     2 Gram Sodium Diet       Discharge Medications   Discharge Medication List as of 4/16/2018 11:27 AM      CONTINUE these medications which have CHANGED    Details   !! bumetanide (BUMEX) 2 MG tablet Take 1 tablet (2 mg) by mouth daily, Disp-60 tablet, R-0, Transitional      !! bumetanide (BUMEX) 1 MG tablet Take 2 tablets (2 mg) by mouth daily as needed For 5 lb weight gain, Disp-180 tablet, R-1, E-Prescribe      metolazone (ZAROXOLYN) 2.5 MG tablet Take 1 tablet (2.5 mg) by mouth daily as needed For 10 lb weight gain, Disp-90 tablet, R-1, E-Prescribe       !! - Potential duplicate medications found. Please discuss with provider.      CONTINUE these medications which have NOT CHANGED    Details   Multiple Vitamins-Minerals (MULTIVITAMIN ADULT PO) Take 1 tablet by mouth daily, Historical      VITAMIN D,  CHOLECALCIFEROL, PO Take 1 tablet by mouth daily, Historical      ALLOPURINOL PO Take 100 mg by mouth daily, Historical      DIGOXIN PO Take 125 mcg by mouth every 48 hours, Historical      warfarin (COUMADIN) 5 MG tablet Take 2.5-5 mg by mouth daily Take 2.5 mg on Tuesday and Sundays, and 5 mg ROW, Historical      carvedilol (COREG) 25 MG tablet Take 25 mg by mouth 2 times daily, Historical      ferrous sulfate 140 (45 FE) MG TBCR Take 140 mg by mouth daily, Historical      atorvastatin (LIPITOR) 80 MG tablet Take 80 mg by mouth At Bedtime, Historical      polyethylene glycol 0.4%- propylene glycol 0.3% (SYSTANE ULTRA) 0.4-0.3 % SOLN ophthalmic solution Place 1 drop into both eyes every hour as needed for dry eyes, Historical      famotidine (PEPCID) 20 MG tablet Take 20 mg by mouth daily , Historical      acetaminophen (TYLENOL) 325 MG tablet Take 325-650 mg by mouth every 4 hours as needed , Disp-250 tablet, Historical      levothyroxine (SYNTHROID, LEVOTHROID) 125 MCG tablet Take 1 tablet by mouth daily., 125 mcg, Oral, DAILY Starting 10/22/2011, Until Discontinued, Disp-90 tablet, R-1, Historical         STOP taking these medications       FUROSEMIDE PO Comments:   Reason for Stopping:             Allergies   Allergies   Allergen Reactions     Oxycodone      Pt states she takes it for back pain, only causes itching     Data   Most Recent 3 CBC's:  Recent Labs   Lab Test  04/13/18   0617  04/11/18   1752  02/15/18   0600   WBC  4.3  4.0  7.5   HGB  8.9*  10.2*  9.0*   MCV  93  92  88   PLT  139*  185  163      Most Recent 3 BMP's:  Recent Labs   Lab Test  04/13/18   0617  04/12/18   1806  04/12/18   0614  04/11/18   1752   NA  142   --   147*  145*   POTASSIUM  3.7  3.6  3.4  3.7   CHLORIDE  108   --   110*  107   CO2  28   --   26  29   BUN  48*   --   35*  38*   CR  1.37*   --   1.31*  1.42*   ANIONGAP  6   --   11  9   NAVARRO  8.9   --   9.4  9.4   GLC  119*   --   106*  123*     Most Recent 2 LFT's:  Recent  Labs   Lab Test  04/11/18   1752  02/13/18   2159   AST  13  13   ALT  17  17   ALKPHOS  93  76   BILITOTAL  1.0  1.0     Most Recent INR's and Anticoagulation Dosing History:  Anticoagulation Dose History     Recent Dosing and Labs Latest Ref Rng & Units 2/13/2018 2/14/2018 2/15/2018 2/16/2018 4/11/2018 4/12/2018 4/13/2018    Warfarin 2.5 mg - - 5 mg 5 mg - - - -    Warfarin 5 mg - - - - - - 5 mg 5 mg    INR 0.86 - 1.14 2.20(H) 2.16(H) 2.13(H) 2.21(H) 2.92(H) 2.47(H) 2.12(H)        Most Recent 3 Troponin's:  Recent Labs   Lab Test  04/12/18   0215  04/11/18   1752  10/21/11   1823  02/27/10   0644   02/25/10   2241   TROPI  0.053*  0.061*   --   1.200*   < >   --    TROPONIN   --    --   0.01   --    --   0.01    < > = values in this interval not displayed.     Results for orders placed or performed during the hospital encounter of 04/11/18   XR Chest 2 Views    Narrative    CHEST TWO VIEW 4/11/2018 7:02 PM     HISTORY: Shortness of breath, crackles.      COMPARISON: Chest x-ray 11/5/2016.      Impression    IMPRESSION: Two views of the chest are performed. Heart size is within  normal limits. Median sternotomy changes are noted. Patient is status  post aortic valve replacement. Mild interstitial lung changes are  present with probable atelectasis at the left lung base which is  stable. No evidence of pneumothorax or significant pleural effusions.    SATYA SAHNI MD     Echo 4/12   The Ejection Fraction is estimated at 60-65%.  The right ventricle is normal size. Global right ventricular function is  moderately reduced.  Severe mitral annular calcification is present. Mild mitral insufficiency is  present.  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 Estimated mean RA pressure  is >15 mmHg.  S/p 21mm Snowden bioprosthetic AVR '11. Doppler interrogation consistent with  moderate prosthetic stenosis (peak velocity 3.8 m/s, mean gradient 32 mmHg,  EOA 1.2 cm2, DVI 0.43, AT 0.8.) There is moderate aortic  insufficiency. Some  of it is central and some is paravalvular.  Compared to prior study on 2/26/10, the patient is now s/p AVR. There is no  post-AVR echo to compare gradients or aortic insufficiency. Recommend AMANDEEP to  assess mechanism and severity of aortic insufficiency.

## 2018-04-13 NOTE — PROGRESS NOTES
Social Work: Assessment with Discharge Plan    Patient Name:  Jayne Brown  :  1927  Age:  89 year old  MRN:  [id number]  Risk/Complexity Score:  Filed Complexity Screen Score: 6  Completed assessment with:  Pt, son Talib    Presenting Information   Reason for Referral:  Discharge plan  Date of Intake:  2018  Referral Source:  Chart Review  Decision Maker:  Pt when able  Alternate Decision Maker:  Higinio Zavala  Health Care Directive:  Will bring in copy  Living Situation:  House  Previous Functional Status:  Independent  Patient and family understanding of hospitalization:  Pt states that she is open to rehab to get stronger before going home  Cultural/Language/Spiritual Considerations:  No needs reported.  Adjustment to Illness:  Pt and son adjusting well.    Physical Health  Reason for Admission:    1. Heart failure with preserved ejection fraction (H)      Services Needed/Recommended:  TCU    Mental Health/Chemical Dependency  Diagnosis:  None reported.  Support/Services in Place:  None  Services Needed/Recommended:  None    Support System  Significant relationship at present time:  Higinio zavala  Family of origin is available for support:  Yes  Other support available:  Yes  Gaps in support system:  Pt requiring rehab prior to returning to home.  Patient is caregiver to:  None     Provider Information   Primary Care Physician:  Dinora Jimenez   [phone]   Clinic:  [facility] [address] / [facility] *      :  None    Financial   Income Source:  Retired  Financial Concerns:  Pt states she cannot afford private room costs.  Insurance:    Payor/Plan Subscriber Name Rel Member # Group #   MEDICARE - MEDICARE F* GERRI BROWN*  [id number]       ATTN CLAIMS, [address]   [facility]* ABRAM BROWNPETER*  [id number]      [address]       Discharge Plan   Patient and family discharge goal:  TCU  Provided education on discharge plan:   YES  Patient agreeable to discharge plan:  YES  A list of Medicare Certified Facilities was provided to the patient and/or family to encourage patient choice. Patient's choices for facility are:    [facility]  PH: [phone]  F: [phone]    Will NH provide Skilled rehabilitation or complex medical:  YES  General information regarding anticipated insurance coverage and possible out of pocket cost was discussed. Patient and patient's family are aware patient may incur the cost of transportation to the facility, pending insurance payment: YES  Barriers to discharge:  Medical stability    Discharge Recommendations   Anticipated Disposition:  Facility:  TCU  Transportation Needs:  Family:  Higinio Mayers to transport on Sunday.  Pt needs to arrive at [facility] at 2 pm.  Name of Transportation Company and Phone:  Son Talib.    Additional comments   SW met with pt and family to introduce self and role in consult.  Pt and son in agreement with placement to [facility].  SW confirmed referral for double room on Sunday.  Pt will need to arrive by 2 pm.  Weekend team to assist with discharge.    ARIANNA Brownlee, APSW  6C Unit   Phone: [phone]  Pager: [phone]  Unit: [phone]

## 2018-04-13 NOTE — PROGRESS NOTES
[facility], [place]    Cardiology Progress Note     Assessment & Plan   Jayne Nino is 89 year old female with a history of atrial fibrillation, HFpEF, HTN, and bioprosthetic aortic valve, mild/mod MR/TR, and CKD who presents with acute hypoxemic respiratory failure likely secondary to decompensated diastolic heart failure.     # Acute hypoxemic respiratory failure, improving   # Acute on chronic diastolic heart failure   # Moderate aortic stenosis s/p 21mm Snowden bioprosthetic valve in 2011  Possibly from discontinued metolazone 1 month ago with subsequent weight gain. Improving slowly with diuresis. TTE with moderate AS and AI. Will need outpt monitoring. Will continue to diurese inpatient for the next 1-2 days given valvulopathies and tenuous fluid balance. Will monitor on oral diuretics before dc.    - Possible transition to oral diuretics tomorrow with dc to TCU Sunday or Monday if all goes well   -Could consider restarting metolazone and K supplement in the future if needed to augment oral diuretics       # Mild troponin elevation  Suspect demand ischemia in the setting of decompensated CHF. No EKG changes. No symptoms of ACS.    --- CHRONIC ISSUES ---  # A fib: carvedilol, digoxin, pharmacy to dose warfarin  # hypothyroid: Levothyroxine  # Anemia, baseline 9-10.  Stable.  Continue home iron  # GERD: H2 blocker  #HLD: statin     FEN: 2g Na and 2L fluid diet.   Prophy: On anticoagulation  Code Status: DNR/DNI.      Disposition Plan   Anticipate 1-2 additional days inpatient for ongoing diuresis. Possible TCU Sunday or Monday pending placement.       Discussed with Dr. Jay.     Malcolm Covarrubias MD  PGY-3 Internal Medicine     Interval History   RN notes reviewed. SANGEETHA overnight. Reports improved breathing. Still feeling weak and dyspneic with exertion. No new concerns.     Physical Exam   Temp: 98  F (36.7  C) Temp src: Oral BP: 111/56 Pulse: 82 Heart Rate: 82  Resp: 18 SpO2: 94 % O2 Device: None (Room air) Oxygen Delivery: 1 LPM  Vitals:    04/11/18 2245 04/12/18 0435 04/13/18 0122   Weight: 71.9 kg (158 lb 9.6 oz) 70.7 kg (155 lb 13.8 oz) 70.5 kg (155 lb 6.4 oz)     Vital Signs with Ranges  Temp:  [97.4  F (36.3  C)-99  F (37.2  C)] 98  F (36.7  C)  Pulse:  [82-84] 82  Heart Rate:  [69-83] 82  Resp:  [18-20] 18  BP: (111-122)/(47-65) 111/56  SpO2:  [92 %-96 %] 94 %  I/O last 3 completed shifts:  In: 820 [P.O.:820]  Out: 1200 [Urine:600; Other:600]    Constitutional: No apparent distress  Eyes: Non icteric   Respiratory: CTAB  Cardiovascular: RRR, normal S1 S2, soft systolic murmur present   GI: NABS, soft, non tender  Skin: Warm and dry   Ext: WWP, improved edema      Medications     - MEDICATION INSTRUCTIONS -       - MEDICATION INSTRUCTIONS -       - MEDICATION INSTRUCTIONS -       Warfarin Therapy Reminder         furosemide  60 mg Intravenous TID     warfarin  5 mg Oral ONCE at 18:00     carvedilol  25 mg Oral BID     ferrous sulfate  325 mg Oral Daily     levothyroxine  125 mcg Oral Daily     digoxin  125 mcg Oral Q48H     atorvastatin  80 mg Oral At Bedtime     famotidine  20 mg Oral Daily       Data   Results for orders placed or performed during the hospital encounter of 04/11/18 (from the past 24 hour(s))   Magnesium   Result Value Ref Range    Magnesium 2.1 1.6 - 2.3 mg/dL   Potassium   Result Value Ref Range    Potassium 3.6 3.4 - 5.3 mmol/L   Digoxin level   Result Value Ref Range    Digoxin Level 0.4 (L) 0.5 - 2.0 ug/L   INR   Result Value Ref Range    INR 2.12 (H) 0.86 - 1.14   Basic metabolic panel   Result Value Ref Range    Sodium 142 133 - 144 mmol/L    Potassium 3.7 3.4 - 5.3 mmol/L    Chloride 108 94 - 109 mmol/L    Carbon Dioxide 28 20 - 32 mmol/L    Anion Gap 6 3 - 14 mmol/L    Glucose 119 (H) 70 - 99 mg/dL    Urea Nitrogen 48 (H) 7 - 30 mg/dL    Creatinine 1.37 (H) 0.52 - 1.04 mg/dL    GFR Estimate 36 (L) >60 mL/min/1.7m2    GFR Estimate If Black  44 (L) >60 mL/min/1.7m2    Calcium 8.9 8.5 - 10.1 mg/dL   Magnesium   Result Value Ref Range    Magnesium 2.1 1.6 - 2.3 mg/dL   CBC with platelets differential   Result Value Ref Range    WBC 4.3 4.0 - 11.0 10e9/L    RBC Count 3.23 (L) 3.8 - 5.2 10e12/L    Hemoglobin 8.9 (L) 11.7 - 15.7 g/dL    Hematocrit 30.0 (L) 35.0 - 47.0 %    MCV 93 78 - 100 fl    MCH 27.6 26.5 - 33.0 pg    MCHC 29.7 (L) 31.5 - 36.5 g/dL    RDW 16.7 (H) 10.0 - 15.0 %    Platelet Count 139 (L) 150 - 450 10e9/L    Diff Method Automated Method     % Neutrophils 64.5 %    % Lymphocytes 26.0 %    % Monocytes 5.8 %    % Eosinophils 3.0 %    % Basophils 0.5 %    % Immature Granulocytes 0.2 %    Nucleated RBCs 0 0 /100    Absolute Neutrophil 2.8 1.6 - 8.3 10e9/L    Absolute Lymphocytes 1.1 0.8 - 5.3 10e9/L    Absolute Monocytes 0.3 0.0 - 1.3 10e9/L    Absolute Eosinophils 0.1 0.0 - 0.7 10e9/L    Absolute Basophils 0.0 0.0 - 0.2 10e9/L    Abs Immature Granulocytes 0.0 0 - 0.4 10e9/L    Absolute Nucleated RBC 0.0        Staff Physician Comments:     I personally interviewed and examined Jayne Nino today, and agree with residents assessment and plan as documented above.      Sena Jay MD     Division of Cardiology  [facility] [place] [address]    Clinic nurse:  Yaya Campoverde LPN   Nurse Care Coordinator- Heart Care   [phone] option 1, than option 3    [phone] Appointments  [phone] Fax  [phone] After hours

## 2018-04-13 NOTE — PROGRESS NOTES
" 04/13/18 1300   Quick Adds   Type of Visit Initial PT Evaluation   Living Environment   Lives With child(sarah), adult  (Son)   Living Arrangements house   Home Accessibility stairs to enter home;tub/shower is not walk in   Number of Stairs to Enter Home 5  (Rail on L going up)   Number of Stairs Within Home (To basement, however, does not use)   Stair Railings at Home none   Transportation Available car;public transportation  ([employer] rides or son provides)   Living Environment Comment Pt lives in 2 story home with her adult son. Pt has 5 stairs to enter the home with a railing on the L going up. Pt has had difficuly with the stairs recently. Pt's son takes care of any rides pt requires.   Self-Care   Dominant Hand right   Usual Activity Tolerance moderate   Current Activity Tolerance fair   Regular Exercise no   Equipment Currently Used at Home walker, rolling;grab bar   Functional Level Prior   Ambulation 1-->assistive equipment   Transferring 1-->assistive equipment   Toileting 1-->assistive equipment  (grab bars)   Bathing 0-->independent   Dressing 0-->independent   Eating 0-->independent   Communication 0-->understands/communicates without difficulty   Cognition 0 - no cognition issues reported   Fall history within last six months yes   Number of times patient has fallen within last six months 1   Which of the above functional risks had a recent onset or change? ambulation;transferring   Prior Functional Level Comment Pt was previously modified independent with transfers and ambulation utilizing a FWW. Pt was independent for bathing and dressing. Pt does report difficulty with stairs and entering into her bathtub.   General Information   Onset of Illness/Injury or Date of Surgery - Date 04/11/18   Referring Physician Rachael Mock MD   Patient/Family Goals Statement Return home.   Pertinent History of Current Problem (include personal factors and/or comorbidities that impact the POC) \"Jayne Nino is " "89 year old female with a history of atrial fibrillation, HFpEF, HTN, and bioprosthetic aortic valve, mild/mod MR/TR, and CKD who presents with acute hypoxemic respiratory failufe likely secondary to decompensated diastolic heart failure.\"   Precautions/Limitations fall precautions   Heart Disease Risk Factors Medical history;High blood pressure;Age   General Observations Pt received seated in upright chair, RN okayed session.   General Info Comments Activity: \"up ad cleo\"   Cognitive Status Examination   Orientation orientation to person, place and time   Level of Consciousness alert   Follows Commands and Answers Questions 100% of the time   Personal Safety and Judgment intact   Memory intact   Posture    Posture Forward head position;Protracted shoulders;Kyphosis   Range of Motion (ROM)   ROM Comment B LE ROM WFL.   Strength   Strength Comments B LE strength ~4/5 based on functional mobility.   Bed Mobility   Bed Mobility Comments Not assessed d/t pt in upright chair upon session initiation.   Transfer Skills   Transfer Comments Pt transferred sit<>stand up to FWW at [facility].   Gait   Gait Comments Pt ambulated ~125ft 4x with FWW at [facility] with seated rest breaks in b/w bouts to relieve SOB and B UE pain from using FWW. Pt ascended/descended 3 stairs 1x with B hands on L handrail at [facility].   Balance   Balance Comments Pt required B UE support in standing for balance.    General Therapy Interventions   Planned Therapy Interventions balance training;bed mobility training;gait training;neuromuscular re-education;ROM;strengthening;stretching;transfer training;home program guidelines;progressive activity/exercise   Clinical Impression   Criteria for Skilled Therapeutic Intervention yes, treatment indicated   PT Diagnosis Impaired Functional Mobility   Influenced by the following impairments Strength, Pain, Balance, Endurance   Functional limitations due to impairments Bed Mobility, Transfers, Gait, Stairs   Clinical " "Presentation Evolving/Changing   Clinical Presentation Rationale Clinical Judgement.   Clinical Decision Making (Complexity) Moderate complexity   Therapy Frequency` 5 times/week   Predicted Duration of Therapy Intervention (days/wks) 1wk   Anticipated Equipment Needs at Discharge (TBD with further evaluation)   Anticipated Discharge Disposition Transitional Care Facility   Risk & Benefits of therapy have been explained Yes   Patient, Family & other staff in agreement with plan of care Yes   [facility] AM-[facility] TM \"6 Clicks\"   2016, Trustees of [facility], under license to [employer].  All rights reserved.   6 Clicks Short Forms Basic Mobility Inpatient Short Form   [facility] AM-PAC  \"6 Clicks\" V.2 Basic Mobility Inpatient Short Form   1. Turning from your back to your side while in a flat bed without using bedrails? 4 - None   2. Moving from lying on your back to sitting on the side of a flat bed without using bedrails? 3 - A Little   3. Moving to and from a bed to a chair (including a wheelchair)? 3 - A Little   4. Standing up from a chair using your arms (e.g., wheelchair, or bedside chair)? 3 - A Little   5. To walk in hospital room? 4 - None   6. Climbing 3-5 steps with a railing? 3 - A Little   Basic Mobility Raw Score (Score out of 24.Lower scores equate to lower levels of function) 20   Total Evaluation Time   Total Evaluation Time (Minutes) 8     "

## 2018-04-13 NOTE — PLAN OF CARE
Problem: Patient Care Overview  Goal: Plan of Care/Patient Progress Review  Vital signs stable, up to bathroom with walker and standby assist, gets LUNDBERG. Denies pain. Plan discharge today to TCU.

## 2018-04-13 NOTE — PLAN OF CARE
Problem: Patient Care Overview  Goal: Plan of Care/Patient Progress Review  OT/CR 6C:  Discharge Planner OT   Patient plan for discharge: TCU   Current status: Pt Mod-maxA donning socks while seated EOB. Educated pt on possible AE (reacher) to assist in LB dressing tasks. Pt tolerated standing while performing g/h tasks at sink. Educated pt on energy conservation while performing ADLs, able to appropriately demonstrate back to therapist. CGA for sit to stand from various surfaces to FWW. Pt ambulated x2 reps 50 ft with FWW and CGA with seated rest break between bouts.   Barriers to return to prior living situation: strength, activity tolerance, mobility   Recommendations for discharge: Per plan established by the Occupational Therapist, the recommendation for discharge location is TCU.   Rationale for recommendations: Pt will benefit from continued skilled therapy to increase activity tolerance and functional mobility for independence in ADLs/IADLs.        Entered by: Sobeida León 04/13/2018 10:59 AM

## 2018-04-13 NOTE — PLAN OF CARE
Problem: Cardiac: Heart Failure (Adult)  Goal: Signs and Symptoms of Listed Potential Problems Will be Absent, Minimized or Managed (Cardiac: Heart Failure)  Signs and symptoms of listed potential problems will be absent, minimized or managed by discharge/transition of care (reference Cardiac: Heart Failure (Adult) CPG).  Outcome: Improving  D/I/A: Pt up in chair most of evening.  Up to bathroom with SBA of one and use of walker.  A+O x4 and able to make needs known.  Slightly [place].  VSSA; A-Fib on monitor.  Voiding in fair amounts without difficulty.  Denies pain or SOA.  Mild LUNDBERG reported.  Mid 90s on O2 Sats on room air.    P: Continue to monitor status.

## 2018-04-13 NOTE — PLAN OF CARE
Problem: Patient Care Overview  Goal: Plan of Care/Patient Progress Review  PT - 6C - PT evaluation completed and treatment initiated.    Discharge Planner PT   Patient plan for discharge: TCU.  Current status: Pt transferred sit<>stand up to FWW at [facility]. Pt ambulated ~125ft 4x with FWW at [facility] with seated rest breaks in b/w bouts to relieve SOB and B UE pain from using FWW. Pt ascended/descended 3 stairs 1x with B hands on L handrail at [facility]. Seated rest breaks required throughout session to relieve SOB and fatigue. VSS on RA.  Barriers to return to prior living situation: Deconditioned, stairs to enter home, below baseline for functional mobility.  Recommendations for discharge: TCU.  Rationale for recommendations: Pt would benefit from additional therapy services to maximize strength, endurance, and balance for improved independence with functional mobility prior to returning home. Intensive therapies are not needed at this time.         Entered by: Tere Cullen 04/13/2018 12:56 PM

## 2018-04-14 ENCOUNTER — APPOINTMENT (OUTPATIENT)
Dept: OCCUPATIONAL THERAPY | Facility: CLINIC | Age: 83
DRG: 291 | End: 2018-04-14
Payer: MEDICARE

## 2018-04-14 LAB
ANION GAP SERPL CALCULATED.3IONS-SCNC: 8 MMOL/L (ref 3–14)
BUN SERPL-MCNC: 54 MG/DL (ref 7–30)
CALCIUM SERPL-MCNC: 9 MG/DL (ref 8.5–10.1)
CHLORIDE SERPL-SCNC: 106 MMOL/L (ref 94–109)
CO2 SERPL-SCNC: 27 MMOL/L (ref 20–32)
CREAT SERPL-MCNC: 1.35 MG/DL (ref 0.52–1.04)
GFR SERPL CREATININE-BSD FRML MDRD: 37 ML/MIN/1.7M2
GLUCOSE SERPL-MCNC: 102 MG/DL (ref 70–99)
INR PPP: 2.24 (ref 0.86–1.14)
MAGNESIUM SERPL-MCNC: 2 MG/DL (ref 1.6–2.3)
POTASSIUM SERPL-SCNC: 3.3 MMOL/L (ref 3.4–5.3)
POTASSIUM SERPL-SCNC: 3.9 MMOL/L (ref 3.4–5.3)
SODIUM SERPL-SCNC: 141 MMOL/L (ref 133–144)

## 2018-04-14 PROCEDURE — 40000802 ZZH SITE CHECK

## 2018-04-14 PROCEDURE — A9270 NON-COVERED ITEM OR SERVICE: HCPCS | Mod: GY | Performed by: INTERNAL MEDICINE

## 2018-04-14 PROCEDURE — 83735 ASSAY OF MAGNESIUM: CPT | Performed by: INTERNAL MEDICINE

## 2018-04-14 PROCEDURE — 25000128 H RX IP 250 OP 636: Performed by: INTERNAL MEDICINE

## 2018-04-14 PROCEDURE — 25000132 ZZH RX MED GY IP 250 OP 250 PS 637: Mod: GY | Performed by: STUDENT IN AN ORGANIZED HEALTH CARE EDUCATION/TRAINING PROGRAM

## 2018-04-14 PROCEDURE — 25000132 ZZH RX MED GY IP 250 OP 250 PS 637: Mod: GY | Performed by: INTERNAL MEDICINE

## 2018-04-14 PROCEDURE — 84132 ASSAY OF SERUM POTASSIUM: CPT | Performed by: INTERNAL MEDICINE

## 2018-04-14 PROCEDURE — 25000125 ZZHC RX 250: Performed by: STUDENT IN AN ORGANIZED HEALTH CARE EDUCATION/TRAINING PROGRAM

## 2018-04-14 PROCEDURE — 40000133 ZZH STATISTIC OT WARD VISIT: Performed by: OCCUPATIONAL THERAPIST

## 2018-04-14 PROCEDURE — 40000141 ZZH STATISTIC PERIPHERAL IV START W/O US GUIDANCE

## 2018-04-14 PROCEDURE — 97535 SELF CARE MNGMENT TRAINING: CPT | Mod: GO | Performed by: OCCUPATIONAL THERAPIST

## 2018-04-14 PROCEDURE — 80048 BASIC METABOLIC PNL TOTAL CA: CPT | Performed by: INTERNAL MEDICINE

## 2018-04-14 PROCEDURE — 36415 COLL VENOUS BLD VENIPUNCTURE: CPT | Performed by: INTERNAL MEDICINE

## 2018-04-14 PROCEDURE — 99233 SBSQ HOSP IP/OBS HIGH 50: CPT | Mod: GC | Performed by: INTERNAL MEDICINE

## 2018-04-14 PROCEDURE — 21400006 ZZH R&B CCU INTERMEDIATE UMMC

## 2018-04-14 PROCEDURE — 85610 PROTHROMBIN TIME: CPT | Performed by: INTERNAL MEDICINE

## 2018-04-14 PROCEDURE — A9270 NON-COVERED ITEM OR SERVICE: HCPCS | Mod: GY | Performed by: STUDENT IN AN ORGANIZED HEALTH CARE EDUCATION/TRAINING PROGRAM

## 2018-04-14 RX ORDER — WARFARIN SODIUM 4 MG/1
4 TABLET ORAL
Status: COMPLETED | OUTPATIENT
Start: 2018-04-14 | End: 2018-04-14

## 2018-04-14 RX ORDER — BUMETANIDE 2 MG/1
2 TABLET ORAL
Status: DISCONTINUED | OUTPATIENT
Start: 2018-04-14 | End: 2018-04-16 | Stop reason: HOSPADM

## 2018-04-14 RX ADMIN — CARVEDILOL 25 MG: 25 TABLET, FILM COATED ORAL at 21:00

## 2018-04-14 RX ADMIN — CARVEDILOL 25 MG: 25 TABLET, FILM COATED ORAL at 08:51

## 2018-04-14 RX ADMIN — POTASSIUM CHLORIDE 20 MEQ: 750 TABLET, EXTENDED RELEASE ORAL at 08:52

## 2018-04-14 RX ADMIN — ATORVASTATIN CALCIUM 80 MG: 80 TABLET, FILM COATED ORAL at 21:00

## 2018-04-14 RX ADMIN — FAMOTIDINE 20 MG: 20 TABLET, FILM COATED ORAL at 08:51

## 2018-04-14 RX ADMIN — WARFARIN SODIUM 4 MG: 4 TABLET ORAL at 17:08

## 2018-04-14 RX ADMIN — BUMETANIDE 2 MG: 2 TABLET ORAL at 17:08

## 2018-04-14 RX ADMIN — ACETAMINOPHEN 650 MG: 325 TABLET, FILM COATED ORAL at 03:15

## 2018-04-14 RX ADMIN — LEVOTHYROXINE SODIUM 125 MCG: 25 TABLET ORAL at 08:51

## 2018-04-14 RX ADMIN — FERROUS SULFATE TAB 325 MG (65 MG ELEMENTAL FE) 325 MG: 325 (65 FE) TAB at 08:51

## 2018-04-14 RX ADMIN — FUROSEMIDE 60 MG: 10 INJECTION, SOLUTION INTRAVENOUS at 08:51

## 2018-04-14 RX ADMIN — POTASSIUM CHLORIDE 40 MEQ: 750 TABLET, EXTENDED RELEASE ORAL at 07:04

## 2018-04-14 RX ADMIN — ACETAMINOPHEN 650 MG: 325 TABLET, FILM COATED ORAL at 13:54

## 2018-04-14 RX ADMIN — ACETAMINOPHEN 650 MG: 325 TABLET, FILM COATED ORAL at 21:00

## 2018-04-14 RX ADMIN — Medication 2 G: at 08:54

## 2018-04-14 ASSESSMENT — PAIN DESCRIPTION - DESCRIPTORS
DESCRIPTORS: ACHING

## 2018-04-14 NOTE — PROGRESS NOTES
[facility], [place]    Cardiology Progress Note     Assessment & Plan   Jayne Nino is 89 year old female with a history of atrial fibrillation, HFpEF, HTN, and bioprosthetic aortic valve, mild/mod MR/TR, and CKD who presents with acute hypoxemic respiratory failure likely secondary to decompensated diastolic heart failure.     # Acute hypoxemic respiratory failure, improving   # Acute on chronic diastolic heart failure   # Moderate aortic stenosis s/p 21mm Snowden bioprosthetic valve in 2011  Possibly from discontinued metolazone 1 month ago with subsequent weight gain. Improving with diuresis. TTE with moderate AS and AI. Will need outpt monitoring.   - Plan for transition to oral diuretics today -> bumex 2 mg once vs 2x daily pending response this afternoon   - Could consider restarting metolazone and K supplement in the future if needed to augment oral diuretics       # Mild troponin elevation  Suspect demand ischemia in the setting of decompensated CHF. No EKG changes. No symptoms of ACS.    --- CHRONIC ISSUES ---  # A fib: carvedilol, digoxin, pharmacy to dose warfarin  # hypothyroid: Levothyroxine  # Anemia, baseline 9-10.  Stable.  Continue home iron  # GERD: H2 blocker  #HLD: statin     FEN: 2g Na and 2L fluid diet.   Prophy: On anticoagulation  Code Status: DNR/DNI.      Disposition Plan   Anticipate medically ready for dc tomorrow after overnight monitoring on oral diuretic regimen.       Discussed with Dr. Jay.     Malcolm Covarrubias MD  PGY-3 Internal Medicine     Interval History   RN notes reviewed. SANGEETHA overnight. Doing well but still a little edematous. No other concerns today. Still weaker than baseline.     ROS is otherwise negative.     Physical Exam   Temp: 97.6  F (36.4  C) Temp src: Oral BP: 109/53 Pulse: 82 Heart Rate: 80 Resp: 18 SpO2: 94 % O2 Device: None (Room air)    Vitals:    04/12/18 0435 04/13/18 0122 04/14/18 0135   Weight: 70.7 kg (155  lb 13.8 oz) 70.5 kg (155 lb 6.4 oz) 70.1 kg (154 lb 8 oz)     Vital Signs with Ranges  Temp:  [97.5  F (36.4  C)-98.5  F (36.9  C)] 97.6  F (36.4  C)  Pulse:  [82] 82  Heart Rate:  [79-87] 80  Resp:  [16-20] 18  BP: (102-124)/(51-64) 109/53  SpO2:  [94 %-96 %] 94 %  I/O last 3 completed shifts:  In: 320 [P.O.:320]  Out: 950 [Urine:950]    Constitutional: No apparent distress  Eyes: Non icteric   Respiratory: CTAB  Cardiovascular: RRR, normal S1 S2, soft systolic murmur present   GI: NABS, soft, non tender  Skin: Warm and dry   Ext: WWP, continuing to improve edema      Medications     - MEDICATION INSTRUCTIONS -       - MEDICATION INSTRUCTIONS -       - MEDICATION INSTRUCTIONS -       Warfarin Therapy Reminder         furosemide  60 mg Intravenous TID     carvedilol  25 mg Oral BID     ferrous sulfate  325 mg Oral Daily     levothyroxine  125 mcg Oral Daily     digoxin  125 mcg Oral Q48H     atorvastatin  80 mg Oral At Bedtime     famotidine  20 mg Oral Daily       Data   Results for orders placed or performed during the hospital encounter of 04/11/18 (from the past 24 hour(s))   Glucose by meter   Result Value Ref Range    Glucose 91 70 - 99 mg/dL   INR   Result Value Ref Range    INR 2.24 (H) 0.86 - 1.14   Basic metabolic panel   Result Value Ref Range    Sodium 141 133 - 144 mmol/L    Potassium 3.3 (L) 3.4 - 5.3 mmol/L    Chloride 106 94 - 109 mmol/L    Carbon Dioxide 27 20 - 32 mmol/L    Anion Gap 8 3 - 14 mmol/L    Glucose 102 (H) 70 - 99 mg/dL    Urea Nitrogen 54 (H) 7 - 30 mg/dL    Creatinine 1.35 (H) 0.52 - 1.04 mg/dL    GFR Estimate 37 (L) >60 mL/min/1.7m2    GFR Estimate If Black 45 (L) >60 mL/min/1.7m2    Calcium 9.0 8.5 - 10.1 mg/dL   Magnesium   Result Value Ref Range    Magnesium 2.0 1.6 - 2.3 mg/dL     Staff Physician Comments:     I personally interviewed and examined Jayne Nino today, and agree with cardiology fellows/residents assessment and plan as documented above.        Sena  MD Misty     Division of Cardiology  [facility] [address] [address]    Clinic nurse:  Yaya Campoverde LPN   Nurse Care Coordinator- Heart Care   [phone] option 1, than option 3    [phone] Appointments  [phone] Fax  [phone] After hours

## 2018-04-14 NOTE — PLAN OF CARE
Problem: Patient Care Overview  Goal: Plan of Care/Patient Progress Review  Outcome: No Change  D: HF exacerbation    I: IV Lasix as ordered. PRN tylenol X2 for bilateral knee pain.     A: VSS, A&O, up SBA with walker. Oxygen stable on RA, dyspnea upon exertion. Tele Afib with BBB and occasional PVC's. Pt up to the bathroom frequently throughout night, otherwise slept well.     P: Discharge to TCU Sunday or Monday. Continue to monitor, notify MD of pertinent changes.

## 2018-04-15 LAB
ANION GAP SERPL CALCULATED.3IONS-SCNC: 8 MMOL/L (ref 3–14)
BUN SERPL-MCNC: 57 MG/DL (ref 7–30)
CALCIUM SERPL-MCNC: 8.8 MG/DL (ref 8.5–10.1)
CHLORIDE SERPL-SCNC: 106 MMOL/L (ref 94–109)
CO2 SERPL-SCNC: 26 MMOL/L (ref 20–32)
CREAT SERPL-MCNC: 1.45 MG/DL (ref 0.52–1.04)
GFR SERPL CREATININE-BSD FRML MDRD: 34 ML/MIN/1.7M2
GLUCOSE SERPL-MCNC: 102 MG/DL (ref 70–99)
INR PPP: 2.55 (ref 0.86–1.14)
MAGNESIUM SERPL-MCNC: 2.4 MG/DL (ref 1.6–2.3)
POTASSIUM SERPL-SCNC: 3.7 MMOL/L (ref 3.4–5.3)
SODIUM SERPL-SCNC: 140 MMOL/L (ref 133–144)

## 2018-04-15 PROCEDURE — A9270 NON-COVERED ITEM OR SERVICE: HCPCS | Mod: GY | Performed by: STUDENT IN AN ORGANIZED HEALTH CARE EDUCATION/TRAINING PROGRAM

## 2018-04-15 PROCEDURE — 25000132 ZZH RX MED GY IP 250 OP 250 PS 637: Mod: GY | Performed by: STUDENT IN AN ORGANIZED HEALTH CARE EDUCATION/TRAINING PROGRAM

## 2018-04-15 PROCEDURE — A9270 NON-COVERED ITEM OR SERVICE: HCPCS | Mod: GY | Performed by: INTERNAL MEDICINE

## 2018-04-15 PROCEDURE — 85610 PROTHROMBIN TIME: CPT | Performed by: INTERNAL MEDICINE

## 2018-04-15 PROCEDURE — 83735 ASSAY OF MAGNESIUM: CPT | Performed by: INTERNAL MEDICINE

## 2018-04-15 PROCEDURE — 80048 BASIC METABOLIC PNL TOTAL CA: CPT | Performed by: INTERNAL MEDICINE

## 2018-04-15 PROCEDURE — 25000132 ZZH RX MED GY IP 250 OP 250 PS 637: Mod: GY | Performed by: INTERNAL MEDICINE

## 2018-04-15 PROCEDURE — 36415 COLL VENOUS BLD VENIPUNCTURE: CPT | Performed by: INTERNAL MEDICINE

## 2018-04-15 PROCEDURE — 99233 SBSQ HOSP IP/OBS HIGH 50: CPT | Mod: GC | Performed by: INTERNAL MEDICINE

## 2018-04-15 PROCEDURE — 21400006 ZZH R&B CCU INTERMEDIATE UMMC

## 2018-04-15 RX ORDER — BUMETANIDE 2 MG/1
2 TABLET ORAL
Qty: 60 TABLET | Refills: 0 | DISCHARGE
Start: 2018-04-15 | End: 2018-04-15

## 2018-04-15 RX ORDER — BUMETANIDE 2 MG/1
2 TABLET ORAL DAILY
Qty: 60 TABLET | Refills: 0 | Status: SHIPPED | OUTPATIENT
Start: 2018-04-15 | End: 2018-04-16

## 2018-04-15 RX ORDER — METOLAZONE 2.5 MG/1
2.5 TABLET ORAL DAILY PRN
Qty: 90 TABLET | Refills: 1 | DISCHARGE
Start: 2018-04-15 | End: 2018-04-15

## 2018-04-15 RX ORDER — WARFARIN SODIUM 3 MG/1
3 TABLET ORAL
Status: COMPLETED | OUTPATIENT
Start: 2018-04-15 | End: 2018-04-15

## 2018-04-15 RX ORDER — METOLAZONE 2.5 MG/1
2.5 TABLET ORAL DAILY PRN
Qty: 90 TABLET | Refills: 1 | DISCHARGE
Start: 2018-04-15 | End: 2018-04-16

## 2018-04-15 RX ORDER — BUMETANIDE 1 MG/1
2 TABLET ORAL DAILY PRN
Qty: 180 TABLET | Refills: 1 | DISCHARGE
Start: 2018-04-15 | End: 2018-04-16

## 2018-04-15 RX ADMIN — ATORVASTATIN CALCIUM 80 MG: 80 TABLET, FILM COATED ORAL at 20:42

## 2018-04-15 RX ADMIN — ACETAMINOPHEN 650 MG: 325 TABLET, FILM COATED ORAL at 22:44

## 2018-04-15 RX ADMIN — WARFARIN SODIUM 3 MG: 3 TABLET ORAL at 18:47

## 2018-04-15 RX ADMIN — FAMOTIDINE 20 MG: 20 TABLET, FILM COATED ORAL at 08:31

## 2018-04-15 RX ADMIN — POTASSIUM CHLORIDE 20 MEQ: 750 TABLET, EXTENDED RELEASE ORAL at 08:31

## 2018-04-15 RX ADMIN — CARVEDILOL 25 MG: 25 TABLET, FILM COATED ORAL at 08:31

## 2018-04-15 RX ADMIN — BUMETANIDE 2 MG: 2 TABLET ORAL at 08:31

## 2018-04-15 RX ADMIN — ACETAMINOPHEN 650 MG: 325 TABLET, FILM COATED ORAL at 08:30

## 2018-04-15 RX ADMIN — BUMETANIDE 2 MG: 2 TABLET ORAL at 17:54

## 2018-04-15 RX ADMIN — DIGOXIN 125 MCG: 125 TABLET ORAL at 08:31

## 2018-04-15 RX ADMIN — ACETAMINOPHEN 650 MG: 325 TABLET, FILM COATED ORAL at 17:52

## 2018-04-15 RX ADMIN — CARVEDILOL 25 MG: 25 TABLET, FILM COATED ORAL at 20:42

## 2018-04-15 RX ADMIN — FERROUS SULFATE TAB 325 MG (65 MG ELEMENTAL FE) 325 MG: 325 (65 FE) TAB at 08:31

## 2018-04-15 RX ADMIN — LEVOTHYROXINE SODIUM 125 MCG: 25 TABLET ORAL at 08:31

## 2018-04-15 RX ADMIN — ACETAMINOPHEN 650 MG: 325 TABLET, FILM COATED ORAL at 03:13

## 2018-04-15 ASSESSMENT — PAIN DESCRIPTION - DESCRIPTORS
DESCRIPTORS: ACHING

## 2018-04-15 NOTE — PLAN OF CARE
Problem: Patient Care Overview  Goal: Plan of Care/Patient Progress Review  Outcome: No Change  D: HF exacerbation     I: IV Lasix switched to PO Bumex 4/14. PRN tylenol X2 for bilateral knee pain, pt reports relief. K replaced 4/14, awaiting AM results, will replace per protocol.     A: VSS, A&O, up SBA with walker. Oxygen stable on RA, reports dyspnea upon exertion. Tele Afib with BBB. Pt up to the bathroom throughout night, slept well between cares. Wt up about 1 lbs today.      P: Discharge to TCU Sunday or Monday. Continue to monitor, notify MD of pertinent changes.

## 2018-04-15 NOTE — PROGRESS NOTES
[facility], [place]    Cardiology Progress Note     Assessment & Plan   Jayne Nino is 89 year old female with a history of atrial fibrillation, HFpEF, HTN, and bioprosthetic aortic valve, mild/mod MR/TR, and CKD who presents with acute hypoxemic respiratory failure likely secondary to decompensated diastolic heart failure.    CHANGES TODAY:  - None, awaiting safe transportation      # Acute hypoxemic respiratory failure, improving   # Acute on chronic diastolic heart failure   # Moderate aortic stenosis s/p 21mm Snowden bioprosthetic valve in 2011  Possibly from discontinued metolazone 1 month ago with subsequent weight gain. Improving with diuresis. TTE with moderate AS and AI. Will need outpt monitoring.   - Transition to oral diuretics 4/14 -> bumex 2 mg daily + PRN if needed for weight gain       # Mild troponin elevation  Suspect demand ischemia in the setting of decompensated CHF. No EKG changes. No symptoms of ACS.    --- CHRONIC ISSUES ---  # A fib: carvedilol, digoxin, pharmacy to dose warfarin  # hypothyroid: Levothyroxine  # Anemia, baseline 9-10.  Stable.  Continue home iron  # GERD: H2 blocker  #HLD: statin     FEN: 2g Na and 2L fluid diet.   Prophy: On anticoagulation  Code Status: DNR/DNI.      Disposition Plan   Anticipate dc tomorrow pending safe transportation      Discussed with Dr. Jay.     Malcolm Covarrubias MD  PGY-3 Internal Medicine     Interval History   RN notes reviewed. SANGEETHA overnight. Doing well. Medically ready for dc. No new concerns.     ROS is otherwise negative.     Physical Exam   Temp: 98.1  F (36.7  C) Temp src: Oral BP: 102/51 Pulse: 79 Heart Rate: 91 Resp: 18 SpO2: 95 % O2 Device: None (Room air)      Vital Signs with Ranges  Temp:  [97  F (36.1  C)-98.3  F (36.8  C)] 98.1  F (36.7  C)  Pulse:  [66-79] 79  Heart Rate:  [77-91] 91  Resp:  [16-20] 18  BP: ()/(49-67) 102/51  SpO2:  [94 %-98 %] 95 %  I/O last 3 completed  shifts:  In: 170 [P.O.:170]  Out: 975 [Urine:975]    Constitutional: No apparent distress  Eyes: Non icteric   Respiratory: CTAB  Cardiovascular: RRR, normal S1 S2, soft systolic murmur present   GI: NABS, soft, non tender  Skin: Warm and dry   Ext: WWP, trace edema      Medications     - MEDICATION INSTRUCTIONS -       - MEDICATION INSTRUCTIONS -       - MEDICATION INSTRUCTIONS -       Warfarin Therapy Reminder         bumetanide  2 mg Oral BID     carvedilol  25 mg Oral BID     ferrous sulfate  325 mg Oral Daily     levothyroxine  125 mcg Oral Daily     digoxin  125 mcg Oral Q48H     atorvastatin  80 mg Oral At Bedtime     famotidine  20 mg Oral Daily       Data   Results for orders placed or performed during the hospital encounter of 04/11/18 (from the past 24 hour(s))   Potassium   Result Value Ref Range    Potassium 3.9 3.4 - 5.3 mmol/L   INR   Result Value Ref Range    INR 2.55 (H) 0.86 - 1.14   Basic metabolic panel   Result Value Ref Range    Sodium 140 133 - 144 mmol/L    Potassium 3.7 3.4 - 5.3 mmol/L    Chloride 106 94 - 109 mmol/L    Carbon Dioxide 26 20 - 32 mmol/L    Anion Gap 8 3 - 14 mmol/L    Glucose 102 (H) 70 - 99 mg/dL    Urea Nitrogen 57 (H) 7 - 30 mg/dL    Creatinine 1.45 (H) 0.52 - 1.04 mg/dL    GFR Estimate 34 (L) >60 mL/min/1.7m2    GFR Estimate If Black 41 (L) >60 mL/min/1.7m2    Calcium 8.8 8.5 - 10.1 mg/dL   Magnesium   Result Value Ref Range    Magnesium 2.4 (H) 1.6 - 2.3 mg/dL     Staff Physician Comments:     I personally interviewed and examined Jayne Nino today, and agree with cardiology fellows/residents assessment and plan as documented above.        Sena Jay MD     Division of Cardiology  [address] [address]    Clinic nurse:  Yaya Campoverde LPN   Nurse Care Coordinator- Heart Care   [phone] option 1, than option 3    [phone] Appointments  [phone]  Fax  [phone] After hours

## 2018-04-15 NOTE — PROGRESS NOTES
-Pt received orders that were carried out to discharge to a transitional care center.  -Pt and her son reviewed and understood her discharge instructions, orders, follow-up appointments and prescriptions.  -Pt's son to transport her to the [facility] this morning.    [facility] contacted our hospital  to inform us that they couldn't accept the Pt today due to weather.

## 2018-04-15 NOTE — PROGRESS NOTES
Social Work Services Progress Note    Hospital Day: 5  Date of Initial Social Work Evaluation:  4/12/2017  Collaborated with:  Family, Med Team, [facility]     Data:  SW spoke with [facility] to confirm plan for admit today. Admission staff reported they are not taking people today due staffing issues because of the weather. Writer spoke with patient's son updated him on the situation. He will plan to transport his mother at 11 tomorrow (4/16/2018).      Intervention:  SW continue to work on facilitate discharge plan.       Assessment:  All are in agreement with plan.     Plan:    Anticipated Disposition:  Facility:  [facility]     Barriers to d/c plan:  Weather and Staffing    Follow Up:  Week day SW will confirm plan with facility and son tomorrow morning.     BRYN Barroso. Weekend    Page 0800 to 1600 [phone]  Page 1600-to Midnight [phone]

## 2018-04-16 VITALS
DIASTOLIC BLOOD PRESSURE: 47 MMHG | SYSTOLIC BLOOD PRESSURE: 100 MMHG | TEMPERATURE: 98 F | WEIGHT: 155.1 LBS | RESPIRATION RATE: 14 BRPM | HEART RATE: 79 BPM | HEIGHT: 65 IN | OXYGEN SATURATION: 96 % | BODY MASS INDEX: 25.84 KG/M2

## 2018-04-16 LAB — INR PPP: 2.81 (ref 0.86–1.14)

## 2018-04-16 PROCEDURE — 25000132 ZZH RX MED GY IP 250 OP 250 PS 637: Mod: GY | Performed by: STUDENT IN AN ORGANIZED HEALTH CARE EDUCATION/TRAINING PROGRAM

## 2018-04-16 PROCEDURE — A9270 NON-COVERED ITEM OR SERVICE: HCPCS | Mod: GY | Performed by: INTERNAL MEDICINE

## 2018-04-16 PROCEDURE — 25000132 ZZH RX MED GY IP 250 OP 250 PS 637: Mod: GY | Performed by: INTERNAL MEDICINE

## 2018-04-16 PROCEDURE — A9270 NON-COVERED ITEM OR SERVICE: HCPCS | Mod: GY | Performed by: STUDENT IN AN ORGANIZED HEALTH CARE EDUCATION/TRAINING PROGRAM

## 2018-04-16 PROCEDURE — 36415 COLL VENOUS BLD VENIPUNCTURE: CPT | Performed by: INTERNAL MEDICINE

## 2018-04-16 PROCEDURE — 85610 PROTHROMBIN TIME: CPT | Performed by: INTERNAL MEDICINE

## 2018-04-16 RX ORDER — METOLAZONE 2.5 MG/1
2.5 TABLET ORAL DAILY PRN
Qty: 90 TABLET | Refills: 1 | Status: SHIPPED | OUTPATIENT
Start: 2018-04-16

## 2018-04-16 RX ORDER — BUMETANIDE 1 MG/1
2 TABLET ORAL DAILY PRN
Qty: 180 TABLET | Refills: 1 | Status: SHIPPED | OUTPATIENT
Start: 2018-04-16

## 2018-04-16 RX ORDER — BUMETANIDE 2 MG/1
2 TABLET ORAL DAILY
Qty: 60 TABLET | Refills: 0 | DISCHARGE
Start: 2018-04-16

## 2018-04-16 RX ORDER — BUMETANIDE 1 MG/1
2 TABLET ORAL DAILY PRN
Qty: 180 TABLET | Refills: 1 | DISCHARGE
Start: 2018-04-16 | End: 2018-04-16

## 2018-04-16 RX ORDER — METOLAZONE 2.5 MG/1
2.5 TABLET ORAL DAILY PRN
Qty: 90 TABLET | Refills: 1 | DISCHARGE
Start: 2018-04-16 | End: 2018-04-16

## 2018-04-16 RX ORDER — WARFARIN SODIUM 3 MG/1
3 TABLET ORAL
Status: DISCONTINUED | OUTPATIENT
Start: 2018-04-16 | End: 2018-04-16 | Stop reason: HOSPADM

## 2018-04-16 RX ADMIN — FERROUS SULFATE TAB 325 MG (65 MG ELEMENTAL FE) 325 MG: 325 (65 FE) TAB at 08:01

## 2018-04-16 RX ADMIN — LEVOTHYROXINE SODIUM 125 MCG: 25 TABLET ORAL at 08:01

## 2018-04-16 RX ADMIN — FAMOTIDINE 20 MG: 20 TABLET, FILM COATED ORAL at 08:01

## 2018-04-16 RX ADMIN — BUMETANIDE 2 MG: 2 TABLET ORAL at 08:01

## 2018-04-16 RX ADMIN — ACETAMINOPHEN 650 MG: 325 TABLET, FILM COATED ORAL at 11:36

## 2018-04-16 RX ADMIN — CARVEDILOL 25 MG: 25 TABLET, FILM COATED ORAL at 08:01

## 2018-04-16 NOTE — PROVIDER NOTIFICATION
"MD Notification    MD Notified: Dr. SIDNEY [place]    Notification date/time: 4/15/16 9PM     Notification interaction: Spoke with MD    Purpose of notification: Pt tele off and IV removed in preparation for discharge today. Discharge delayed d/t weather conditions. No tele on, or IV in place at this time.    Comments: Notified that it is okay for pt to remain off of tele. MD requested new IV be placed. When discussed with pt, pt refused new IV be placed. Stating \"I do not want to be resuscitated anyway.\" Code status is DNR. MD notified of pt's decision. Will continue to monitor.     "

## 2018-04-16 NOTE — PROGRESS NOTES
Social Work Services Discharge Note      Patient Name:  Jayne Nino     Anticipated Discharge Date: 04/16/18 @ 1100    Discharge Disposition:   Facility: [facility]  PH: [phone]  F: [phone]  Nurse to Nurse Call: PH: [phone]    Following MD: Dinora Jimenez  [facility] [address] [facility] *     Pre-Admission Screening (PAS) online form has been completed.  The Level of Care (LOC) is:  Determined  Confirmation Code is:  [id number]  Patient/caregiver informed of referral to [facility] Line for Pre-Admission Screening for skilled nursing facility (SNF) placement and to expect a phone call post discharge from SNF.     Additional Services/Equipment Arranged:    - Transportation: Family - Talib (son) (PH: [phone]) to transport pt at 1100.     Patient / Family response to discharge plan:  Pt and family in agreement with the plan.     Persons notified of above discharge plan:  Pt, Family, bedside RN, Cards 1 team, PCP, SNF admissions.    Staff Discharge Instructions:  Please fax discharge orders and signed hard scripts for any controlled substances.  Please print a packet and send with patient.     CTS Handoff completed:  YES    Medicare Notice of Rights provided to the patient/family:  YES    ARIANNA Brownlee, SHAQUILLEW  6C Unit   Phone: [phone]  Pager: [phone]  Unit: [phone]

## 2018-04-16 NOTE — PLAN OF CARE
Problem: Patient Care Overview  Goal: Plan of Care/Patient Progress Review  Outcome: Adequate for Discharge Date Met: 04/16/18    .DISCHARGE                         4/16/2018 11:43 AM  ----------------------------------------------------------------------------  Discharged to: Facility: [facility]  Via: Automobile  Accompanied by: Son  Discharge Instructions: Reviewed with pt and son.   Prescriptions: To be filled by Facility. Medications reviewed.         Follow Up Appointments: arranged; information given  Belongings: All sent with pt  IV: out  Telemetry: off  Pt exhibits understanding of above discharge instructions; all questions answered.    Discharge Paperwork: Signed, copied, and sent with patient & son

## 2018-04-16 NOTE — PLAN OF CARE
Problem: Patient Care Overview  Goal: Plan of Care/Patient Progress Review  Outcome: No Change  D: HF exacerbation      I: PRN tylenol for bilateral knee pain. K replaced 4/15, awaiting AM results, will replace per protocol.      A: VSS, A&O, up SBA with walker. Oxygen stable on RA, reports dyspnea upon exertion. No tele, see previous note. Pt up to the bathroom throughout night, slept well between cares. Pt refusing IV placement at this time since she is scheduled to discharge today.       P: Discharge to TCU. Continue to monitor, notify MD of pertinent changes.

## 2018-04-17 NOTE — PLAN OF CARE
Problem: Patient Care Overview  Goal: Plan of Care/Patient Progress Review  Occupational Therapy Discharge Summary    Reason for therapy discharge:    Discharged to transitional care facility.    Progress towards therapy goal(s). See goals on Care Plan in [facility] electronic health record for goal details.  Goals partially met.  Barriers to achieving goals:   discharge from facility.    Therapy recommendation(s):    Continued therapy is recommended.  Rationale/Recommendations:  to increase I with ADLs, functional mobility and transfers.

## 2018-04-18 ENCOUNTER — HOSPITAL LABORATORY (OUTPATIENT)
Dept: OTHER | Facility: CLINIC | Age: 83
End: 2018-04-18

## 2018-04-18 ENCOUNTER — NURSING HOME VISIT (OUTPATIENT)
Dept: GERIATRICS | Facility: CLINIC | Age: 83
End: 2018-04-18
Payer: COMMERCIAL

## 2018-04-18 VITALS
DIASTOLIC BLOOD PRESSURE: 66 MMHG | WEIGHT: 157 LBS | SYSTOLIC BLOOD PRESSURE: 104 MMHG | TEMPERATURE: 98.1 F | HEART RATE: 72 BPM | RESPIRATION RATE: 16 BRPM | OXYGEN SATURATION: 97 % | BODY MASS INDEX: 26.13 KG/M2

## 2018-04-18 DIAGNOSIS — I48.0 PAROXYSMAL ATRIAL FIBRILLATION (H): ICD-10-CM

## 2018-04-18 DIAGNOSIS — I50.33 ACUTE ON CHRONIC DIASTOLIC HEART FAILURE (H): ICD-10-CM

## 2018-04-18 DIAGNOSIS — I35.0 MODERATE AORTIC STENOSIS: ICD-10-CM

## 2018-04-18 DIAGNOSIS — J96.01 ACUTE HYPOXEMIC RESPIRATORY FAILURE (H): Primary | ICD-10-CM

## 2018-04-18 DIAGNOSIS — I10 ESSENTIAL HYPERTENSION: ICD-10-CM

## 2018-04-18 DIAGNOSIS — R53.81 PHYSICAL DECONDITIONING: ICD-10-CM

## 2018-04-18 LAB
ANION GAP SERPL CALCULATED.3IONS-SCNC: 8 MMOL/L (ref 3–14)
BUN SERPL-MCNC: 62 MG/DL (ref 7–30)
CALCIUM SERPL-MCNC: 9.1 MG/DL (ref 8.5–10.1)
CHLORIDE SERPL-SCNC: 105 MMOL/L (ref 94–109)
CO2 SERPL-SCNC: 27 MMOL/L (ref 20–32)
CREAT SERPL-MCNC: 1.44 MG/DL (ref 0.52–1.04)
GFR SERPL CREATININE-BSD FRML MDRD: 34 ML/MIN/1.7M2
GLUCOSE SERPL-MCNC: 97 MG/DL (ref 70–99)
INR PPP: 2.67 (ref 0.86–1.14)
POTASSIUM SERPL-SCNC: 3.7 MMOL/L (ref 3.4–5.3)
SODIUM SERPL-SCNC: 140 MMOL/L (ref 133–144)

## 2018-04-18 PROCEDURE — 99310 SBSQ NF CARE HIGH MDM 45: CPT | Performed by: NURSE PRACTITIONER

## 2018-04-18 NOTE — PROGRESS NOTES
[facility]  PRIMARY CARE PROVIDER AND CLINIC:  Dinora Jimenez [facility] [address] / [facility] *  Chief Complaint   Patient presents with     Hospital F/U       HPI:    Jayne Nino is a 89 year old  (6/23/1927),admitted to the [facility] from [facility].  Hospital stay 4/11/18 through 4/16/18.  Admitted to this facility for  rehab, medical management and nursing care.     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  She was seen by PCP in March and from that note it appears that she was taken off of K supplement and metolazone, although patient is unsure if she is still taking metolazone or not.   She says she has noticed some weight gain (dry weight ~ 145, now closer to 160), increased LE swelling, PND, and orthopnea.  She is usually limited to ADLs with a walker in her house, where she lives with her son -- and has noticed some dyspnea with ADLs as well.  She says she has been taking her meds.     Jayne Nino was admitted on 4/11/2018.  The following problems were addressed during her hospitalization:     # Acute hypoxemic respiratory failure, resolved  # Acute on chronic diastolic heart failure   # Moderate aortic stenosis s/p 21mm Snowden bioprosthetic valve in 2011  Etiology of exacerbation was likely discontinuation of metolazone 1 month ago with subsequent weight gain. Worsening valvular disease was considered and TTE showed stable moderate AS but with moderate AI. Will need outpt monitoring to discuss potential need for valve in valve TAVR. Diuresed with several days of IV diuretics with good response. Transitioned to orals on day prior to dc. Weight and creatinine stable.       # Demand ischemia    Troponin elevated but down-trended after admission. Demand ischemia in the setting of decompensated CHF. No EKG changes. No symptoms of ACS.      HPI information obtained from: facility chart records, facility staff, patient report and [facility] chart review.      Current issues are:      Acute hypoxemic respiratory failure (H)  Acute on chronic diastolic heart failure (H)  Moderate aortic stenosis  See above. Weight today was taken after breakfast. She says her dyspnea is still present but is improved. No hypoxia. Ongoing edema which is not typical for her. She is concerned about ordering compression garments as she would not be able to manage these on her own.    Wt Readings from Last 4 Encounters:   04/18/18 157 lb (71.2 kg)   04/16/18 155 lb 1.6 oz (70.4 kg)   02/14/18 146 lb 3.2 oz (66.3 kg)   11/06/16 172 lb 9.6 oz (78.3 kg)       Paroxysmal atrial fibrillation (H)  HR 65-83. Current Coumadin dosing 2.5mg po Sun/Tues ; 5mg po all other days. INR 2.67 today.    Essential hypertension  Denies dizziness  BP readings range:  104/66  85/52   113/58  102/63  102/60  100/45  101/42  113/66    Physical deconditioning  Being evaluate by therapy today. She plans to return home with her son.       CODE STATUS/ADVANCE DIRECTIVES DISCUSSION:   DNR / Comfort measures  Patient's living condition: lives with family, son     ALLERGIES:Oxycodone  PAST MEDICAL HISTORY:  has a past medical history of A-fib (H); Breast cancer (H); Gout; Heart disease; High cholesterol; and Hypothyroid.  PAST SURGICAL HISTORY:  has a past surgical history that includes Replace valve aortic and orthopedic surgery.  FAMILY HISTORY: family history includes CEREBROVASCULAR DISEASE in her mother; Coronary Artery Disease in her father and mother. There is no history of Breast Cancer, Colon Cancer, or Other Cancer.  SOCIAL HISTORY:  reports that she has never smoked. She has never used smokeless tobacco. She reports that she does not drink alcohol  or use illicit drugs.    Post Discharge Medication Reconciliation Status: discharge medications reconciled, continue medications without change.  Current Outpatient Prescriptions   Medication Sig Dispense Refill     acetaminophen (TYLENOL) 325 MG tablet Take 325-650 mg by mouth every 4 hours as needed  250 tablet      ALLOPURINOL PO Take 100 mg by mouth daily       atorvastatin (LIPITOR) 80 MG tablet Take 80 mg by mouth At Bedtime       bumetanide (BUMEX) 1 MG tablet Take 2 tablets (2 mg) by mouth daily as needed For 5 lb weight gain 180 tablet 1     bumetanide (BUMEX) 2 MG tablet Take 1 tablet (2 mg) by mouth daily 60 tablet 0     carvedilol (COREG) 25 MG tablet Take 25 mg by mouth 2 times daily       DIGOXIN PO Take 125 mcg by mouth every 48 hours       famotidine (PEPCID) 20 MG tablet Take 20 mg by mouth daily        ferrous sulfate 140 (45 FE) MG TBCR Take 140 mg by mouth daily       levothyroxine (SYNTHROID, LEVOTHROID) 125 MCG tablet Take 1 tablet by mouth daily. 90 tablet 1     metolazone (ZAROXOLYN) 2.5 MG tablet Take 1 tablet (2.5 mg) by mouth daily as needed For 10 lb weight gain 90 tablet 1     Multiple Vitamins-Minerals (MULTIVITAMIN ADULT PO) Take 1 tablet by mouth daily       polyethylene glycol 0.4%- propylene glycol 0.3% (SYSTANE ULTRA) 0.4-0.3 % SOLN ophthalmic solution Place 1 drop into both eyes every hour as needed for dry eyes       VITAMIN D, CHOLECALCIFEROL, PO Take 1 tablet by mouth daily       warfarin (COUMADIN) 5 MG tablet Take 2.5-5 mg by mouth daily Take 2.5 mg on Tuesday and Sundays, and 5 mg ROW         ROS:  10 point ROS of systems including Constitutional, Eyes, Respiratory, Cardiovascular, Gastroenterology, Genitourinary, Integumentary, Muscularskeletal, Psychiatric were all negative except for pertinent positives noted in my HPI.    Exam:  /66  Pulse 72  Temp 98.1  F (36.7  C)  Resp 16  Wt 157 lb (71.2 kg)  SpO2 97%  BMI 26.13 kg/m2  GENERAL APPEARANCE:  Alert, in no  distress, oriented  ENT:  Mouth and posterior oropharynx normal, moist mucous membranes, normal hearing acuity  EYES:  EOM, conjunctivae, lids, pupils and irises normal  NECK:  No adenopathy,masses or thyromegaly  RESP:  respiratory effort and palpation of chest normal, lungs clear to auscultation , no respiratory distress  CV:  Palpation and auscultation of heart done , peripheral edema 2-3+ in bilateral lower legs, irregular rhythm (afib), grade 3/6 murmur  ABDOMEN:  normal bowel sounds, soft, nontender, no hepatosplenomegaly or other masses  SKIN:  Inspection of skin and subcutaneous tissue baseline, Palpation of skin and subcutaneous tissue baseline  PSYCH:  oriented X 3, normal insight, judgement and memory, affect and mood normal    Lab/Diagnostic data:  CBC RESULTS:   Recent Labs   Lab Test  04/13/18   0617  04/11/18   1752   WBC  4.3  4.0   RBC  3.23*  3.65*   HGB  8.9*  10.2*   HCT  30.0*  33.6*   MCV  93  92   MCH  27.6  27.9   MCHC  29.7*  30.4*   RDW  16.7*  16.3*   PLT  139*  185       Last Basic Metabolic Panel:  Recent Labs   Lab Test  04/18/18   0604  04/15/18   0625   NA  140  140   POTASSIUM  3.7  3.7   CHLORIDE  105  106   NAVARRO  9.1  8.8   CO2  27  26   BUN  62*  57*   CR  1.44*  1.45*   GLC  97  102*     Lab Results   Component Value Date    INR 2.67 04/18/2018    INR 2.81 04/16/2018    INR 2.55 04/15/2018    INR 2.24 04/14/2018    INR 2.12 04/13/2018    INR 2.47 04/12/2018         ASSESSMENT/PLAN:  (J96.01) Acute hypoxemic respiratory failure (H)  (primary encounter diagnosis)  (I50.33) Acute on chronic diastolic heart failure (H)  (I35.0) Moderate aortic stenosis  Comment: Improving. Weight still up, seems to be peripheral edema for the most part. Kidney function stable.   Plan: Lymphedema therapy eval and treat, conservatively due to risk for pulmonary edema. Continue current medications. Monitor BMP. Monitor weights q day, order written to obtain weight prior to breakfast. Call provider if  greater than 3 pound gain from previous weight. Monitor for shortness of breath, wheezing, increasing lower extremity edema and change in activity tolerance.     (I48.0) Paroxysmal atrial fibrillation (H)  Comment: Rate controlled. INR therapeutic for goal 2-3  Plan: Continue current POC with no changes at this time. INR one week    (I10) Essential hypertension  Comment: Current medications are for AS, CHF. No symptomatic hypotension at this point  Plan: Monitor BP. Monitor for hypotensive s/sx    (R53.81) Physical deconditioning  Comment: Due to acute illness. Plan is to discharge home when goals met  Plan: PT/OT eval and treat, discharge planning per their recommendations.      Orders:  Coumadin 2.5mg Tues, Sun and 5mg all other days  INR, BMP 4/25/18  Lymphedema therapy eval and treat      Total time spent with patient visit at the skilled nursing facility was 45 min including patient visit and review of past records. Greater than 50% of total time spent with counseling and coordinating care due to CHF, physical deconditioning    Electronically signed by:  YURIY Wild [facility]  Pager: [phone]

## 2018-04-18 NOTE — LETTER
4/18/2018        RE: Jayne Nino  [address]  [facility] [address]        [facility]  PRIMARY CARE PROVIDER AND CLINIC:  Dinora Jimenez [facility] [address] / [facility] *  Chief Complaint   Patient presents with     Hospital F/U       HPI:    Jayne Nino is a 89 year old  (6/23/1927),admitted to the [facility] from [facility].  Hospital stay 4/11/18 through 4/16/18.  Admitted to this facility for  rehab, medical management and nursing care.     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  She was seen by PCP in March and from that note it appears that she was taken off of K supplement and metolazone, although patient is unsure if she is still taking metolazone or not.   She says she has noticed some weight gain (dry weight ~ 145, now closer to 160), increased LE swelling, PND, and orthopnea.  She is usually limited to ADLs with a walker in her house, where she lives with her son -- and has noticed some dyspnea with ADLs as well.  She says she has been taking her meds.     Jayne Nino was admitted on 4/11/2018.  The following problems were addressed during her hospitalization:     # Acute hypoxemic respiratory failure, resolved  # Acute on chronic diastolic heart failure   # Moderate aortic stenosis s/p 21mm Snowden bioprosthetic valve in 2011  Etiology of exacerbation was likely discontinuation of metolazone 1 month ago with subsequent weight gain. Worsening valvular disease was considered and TTE showed stable moderate AS but with moderate AI. Will need outpt monitoring to discuss potential need for valve in valve TAVR. Diuresed with several days of IV diuretics with good response.  Transitioned to orals on day prior to dc. Weight and creatinine stable.       # Demand ischemia   Troponin elevated but down-trended after admission. Demand ischemia in the setting of decompensated CHF. No EKG changes. No symptoms of ACS.      HPI information obtained from: facility chart records, facility staff, patient report and [facility] chart review.      Current issues are:      Acute hypoxemic respiratory failure (H)  Acute on chronic diastolic heart failure (H)  Moderate aortic stenosis  See above. Weight today was taken after breakfast. She says her dyspnea is still present but is improved. No hypoxia. Ongoing edema which is not typical for her. She is concerned about ordering compression garments as she would not be able to manage these on her own.    Wt Readings from Last 4 Encounters:   04/18/18 157 lb (71.2 kg)   04/16/18 155 lb 1.6 oz (70.4 kg)   02/14/18 146 lb 3.2 oz (66.3 kg)   11/06/16 172 lb 9.6 oz (78.3 kg)       Paroxysmal atrial fibrillation (H)  HR 65-83. Current Coumadin dosing 2.5mg po Sun/Tues ; 5mg po all other days. INR 2.67 today.    Essential hypertension  Denies dizziness  BP readings range:  104/66  85/52   113/58  102/63  102/60  100/45  101/42  113/66    Physical deconditioning  Being evaluate by therapy today. She plans to return home with her son.       CODE STATUS/ADVANCE DIRECTIVES DISCUSSION:   DNR / Comfort measures  Patient's living condition: lives with family, son     ALLERGIES:Oxycodone  PAST MEDICAL HISTORY:  has a past medical history of A-fib (H); Breast cancer (H); Gout; Heart disease; High cholesterol; and Hypothyroid.  PAST SURGICAL HISTORY:  has a past surgical history that includes Replace valve aortic and orthopedic surgery.  FAMILY HISTORY: family history includes CEREBROVASCULAR DISEASE in her mother; Coronary Artery Disease in her father and mother. There is no history of Breast Cancer, Colon Cancer, or Other Cancer.  SOCIAL HISTORY:  reports that she has  never smoked. She has never used smokeless tobacco. She reports that she does not drink alcohol or use illicit drugs.    Post Discharge Medication Reconciliation Status: discharge medications reconciled, continue medications without change.  Current Outpatient Prescriptions   Medication Sig Dispense Refill     acetaminophen (TYLENOL) 325 MG tablet Take 325-650 mg by mouth every 4 hours as needed  250 tablet      ALLOPURINOL PO Take 100 mg by mouth daily       atorvastatin (LIPITOR) 80 MG tablet Take 80 mg by mouth At Bedtime       bumetanide (BUMEX) 1 MG tablet Take 2 tablets (2 mg) by mouth daily as needed For 5 lb weight gain 180 tablet 1     bumetanide (BUMEX) 2 MG tablet Take 1 tablet (2 mg) by mouth daily 60 tablet 0     carvedilol (COREG) 25 MG tablet Take 25 mg by mouth 2 times daily       DIGOXIN PO Take 125 mcg by mouth every 48 hours       famotidine (PEPCID) 20 MG tablet Take 20 mg by mouth daily        ferrous sulfate 140 (45 FE) MG TBCR Take 140 mg by mouth daily       levothyroxine (SYNTHROID, LEVOTHROID) 125 MCG tablet Take 1 tablet by mouth daily. 90 tablet 1     metolazone (ZAROXOLYN) 2.5 MG tablet Take 1 tablet (2.5 mg) by mouth daily as needed For 10 lb weight gain 90 tablet 1     Multiple Vitamins-Minerals (MULTIVITAMIN ADULT PO) Take 1 tablet by mouth daily       polyethylene glycol 0.4%- propylene glycol 0.3% (SYSTANE ULTRA) 0.4-0.3 % SOLN ophthalmic solution Place 1 drop into both eyes every hour as needed for dry eyes       VITAMIN D, CHOLECALCIFEROL, PO Take 1 tablet by mouth daily       warfarin (COUMADIN) 5 MG tablet Take 2.5-5 mg by mouth daily Take 2.5 mg on Tuesday and Sundays, and 5 mg ROW         ROS:  10 point ROS of systems including Constitutional, Eyes, Respiratory, Cardiovascular, Gastroenterology, Genitourinary, Integumentary, Muscularskeletal, Psychiatric were all negative except for pertinent positives noted in my HPI.    Exam:  /66  Pulse 72  Temp 98.1  F (36.7  C)   Resp 16  Wt 157 lb (71.2 kg)  SpO2 97%  BMI 26.13 kg/m2  GENERAL APPEARANCE:  Alert, in no distress, oriented  ENT:  Mouth and posterior oropharynx normal, moist mucous membranes, normal hearing acuity  EYES:  EOM, conjunctivae, lids, pupils and irises normal  NECK:  No adenopathy,masses or thyromegaly  RESP:  respiratory effort and palpation of chest normal, lungs clear to auscultation , no respiratory distress  CV:  Palpation and auscultation of heart done , peripheral edema 2-3+ in bilateral lower legs, irregular rhythm (afib), grade 3/6 murmur  ABDOMEN:  normal bowel sounds, soft, nontender, no hepatosplenomegaly or other masses  SKIN:  Inspection of skin and subcutaneous tissue baseline, Palpation of skin and subcutaneous tissue baseline  PSYCH:  oriented X 3, normal insight, judgement and memory, affect and mood normal    Lab/Diagnostic data:  CBC RESULTS:   Recent Labs   Lab Test  04/13/18   0617  04/11/18   1752   WBC  4.3  4.0   RBC  3.23*  3.65*   HGB  8.9*  10.2*   HCT  30.0*  33.6*   MCV  93  92   MCH  27.6  27.9   MCHC  29.7*  30.4*   RDW  16.7*  16.3*   PLT  139*  185       Last Basic Metabolic Panel:  Recent Labs   Lab Test  04/18/18   0604  04/15/18   0625   NA  140  140   POTASSIUM  3.7  3.7   CHLORIDE  105  106   NAVARRO  9.1  8.8   CO2  27  26   BUN  62*  57*   CR  1.44*  1.45*   GLC  97  102*     Lab Results   Component Value Date    INR 2.67 04/18/2018    INR 2.81 04/16/2018    INR 2.55 04/15/2018    INR 2.24 04/14/2018    INR 2.12 04/13/2018    INR 2.47 04/12/2018         ASSESSMENT/PLAN:  (J96.01) Acute hypoxemic respiratory failure (H)  (primary encounter diagnosis)  (I50.33) Acute on chronic diastolic heart failure (H)  (I35.0) Moderate aortic stenosis  Comment: Improving. Weight still up, seems to be peripheral edema for the most part. Kidney function stable.   Plan: Lymphedema therapy eval and treat, conservatively due to risk for pulmonary edema. Continue current medications. Monitor BMP.  Monitor weights q day, order written to obtain weight prior to breakfast. Call provider if greater than 3 pound gain from previous weight. Monitor for shortness of breath, wheezing, increasing lower extremity edema and change in activity tolerance.     (I48.0) Paroxysmal atrial fibrillation (H)  Comment: Rate controlled. INR therapeutic for goal 2-3  Plan: Continue current POC with no changes at this time. INR one week    (I10) Essential hypertension  Comment: Current medications are for AS, CHF. No symptomatic hypotension at this point  Plan: Monitor BP. Monitor for hypotensive s/sx    (R53.81) Physical deconditioning  Comment: Due to acute illness. Plan is to discharge home when goals met  Plan: PT/OT eval and treat, discharge planning per their recommendations.      Orders:  Coumadin 2.5mg Tues, Sun and 5mg all other days  INR, BMP 4/25/18  Lymphedema therapy eval and treat      Total time spent with patient visit at the skilled nursing facility was 45 min including patient visit and review of past records. Greater than 50% of total time spent with counseling and coordinating care due to CHF, physical deconditioning    Electronically signed by:  YURIY Wild [facility]  Pager: [phone]

## 2018-04-25 ENCOUNTER — NURSING HOME VISIT (OUTPATIENT)
Dept: GERIATRICS | Facility: CLINIC | Age: 83
End: 2018-04-25
Payer: COMMERCIAL

## 2018-04-25 ENCOUNTER — HOSPITAL LABORATORY (OUTPATIENT)
Dept: OTHER | Facility: CLINIC | Age: 83
End: 2018-04-25

## 2018-04-25 VITALS
TEMPERATURE: 97.5 F | SYSTOLIC BLOOD PRESSURE: 116 MMHG | DIASTOLIC BLOOD PRESSURE: 73 MMHG | OXYGEN SATURATION: 95 % | WEIGHT: 152.5 LBS | RESPIRATION RATE: 17 BRPM | HEART RATE: 89 BPM | BODY MASS INDEX: 25.38 KG/M2

## 2018-04-25 DIAGNOSIS — I35.0 MODERATE AORTIC STENOSIS: ICD-10-CM

## 2018-04-25 DIAGNOSIS — I10 ESSENTIAL HYPERTENSION: ICD-10-CM

## 2018-04-25 DIAGNOSIS — I48.0 PAROXYSMAL ATRIAL FIBRILLATION (H): ICD-10-CM

## 2018-04-25 DIAGNOSIS — R53.81 PHYSICAL DECONDITIONING: ICD-10-CM

## 2018-04-25 DIAGNOSIS — I50.33 ACUTE ON CHRONIC DIASTOLIC HEART FAILURE (H): ICD-10-CM

## 2018-04-25 DIAGNOSIS — J96.01 ACUTE HYPOXEMIC RESPIRATORY FAILURE (H): Primary | ICD-10-CM

## 2018-04-25 LAB — INR PPP: 2.86 (ref 0.86–1.14)

## 2018-04-25 PROCEDURE — 99309 SBSQ NF CARE MODERATE MDM 30: CPT | Performed by: NURSE PRACTITIONER

## 2018-04-25 NOTE — PROGRESS NOTES
[place] [facility]    Chief Complaint   Patient presents with     RECHECK       HPI:    Jayne Nino is a 89 year old  (6/23/1927), who is being seen today for an episodic care visit at [facility] [place]. Hospital stay was at  [facility] from 4/11/18 through 4/16/18. 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 She was treated for CHF exacerbation likely related to recently stopping metolazone.  Admitted to this facility for  rehab, medical management and nursing care.      HPI information obtained from: facility chart records, facility staff, patient report and [facility] Epic chart review.    Today's concern is:  Acute hypoxemic respiratory failure (H)  Acute on chronic diastolic heart failure (H)  Moderate aortic stenosis  She says her dyspnea is still present but is improved. No hypoxia. Ongoing edema which is not typical for her. Lymphedema therapy is seeing her, but being very conservative in order to avoid centralizing the fluid into her lungs. They have ordered TG shape stockings. She has had no compression garments at this point.    Wt Readings from Last 4 Encounters:   04/25/18 152 lb 8 oz (69.2 kg)   04/18/18 157 lb (71.2 kg)   04/16/18 155 lb 1.6 oz (70.4 kg)   02/14/18 146 lb 3.2 oz (66.3 kg)       Paroxysmal atrial fibrillation (H)  HR 65-89. Current Coumadin dosing 2.5mg po Sun/Tues ; 5mg po all other days. INR 2.67 4/18. INR 2.84 today.    Essential hypertension  Denies dizziness  BP readings range:  116/73  115/64  134/78  115/66  132/69  112/43  93/57  140/77  121/65  119/71  104/66      Physical deconditioning  She plans to return home with her son. Therapy reports that she is walking 120 feet x 2 with a rest break. Supervision with transfers, walking.      ALLERGIES: Oxycodone  Past Medical, Surgical, Family and Social History reviewed and updated in  [facility].    Current Outpatient Prescriptions   Medication Sig Dispense Refill     acetaminophen (TYLENOL) 325 MG tablet Take 650 mg by mouth every 6 hours as needed  250 tablet      ALLOPURINOL PO Take 100 mg by mouth daily       atorvastatin (LIPITOR) 80 MG tablet Take 80 mg by mouth At Bedtime       bumetanide (BUMEX) 1 MG tablet Take 2 tablets (2 mg) by mouth daily as needed For 5 lb weight gain 180 tablet 1     bumetanide (BUMEX) 2 MG tablet Take 1 tablet (2 mg) by mouth daily 60 tablet 0     carvedilol (COREG) 25 MG tablet Take 25 mg by mouth 2 times daily       DIGOXIN PO Take 125 mcg by mouth every 48 hours       famotidine (PEPCID) 20 MG tablet Take 20 mg by mouth daily        ferrous sulfate 140 (45 FE) MG TBCR Take 140 mg by mouth daily       levothyroxine (SYNTHROID, LEVOTHROID) 125 MCG tablet Take 1 tablet by mouth daily. 90 tablet 1     Menthol, Topical Analgesic, (ICY HOT EX) Externally apply topically 2 times daily Knees, shoulders, back       metolazone (ZAROXOLYN) 2.5 MG tablet Take 1 tablet (2.5 mg) by mouth daily as needed For 10 lb weight gain 90 tablet 1     Multiple Vitamins-Minerals (MULTIVITAMIN ADULT PO) Take 1 tablet by mouth daily       polyethylene glycol 0.4%- propylene glycol 0.3% (SYSTANE ULTRA) 0.4-0.3 % SOLN ophthalmic solution Place 1 drop into both eyes every hour as needed for dry eyes       VITAMIN D, CHOLECALCIFEROL, PO Take 1,000 Units by mouth daily        warfarin (COUMADIN) 5 MG tablet Take by mouth daily Take as directed per INR       Medications reviewed:  Medications reconciled to facility chart and changes were made to reflect current medications as identified as above med list.    REVIEW OF SYSTEMS:  10 point ROS of systems including Constitutional, Eyes, Respiratory, Cardiovascular, Gastroenterology, Genitourinary, Integumentary, Muscularskeletal, Psychiatric were all negative except for pertinent positives noted in my HPI.    Physical Exam:  /73  Pulse 89  Temp  97.5  F (36.4  C)  Resp 17  Wt 152 lb 8 oz (69.2 kg)  SpO2 95%  BMI 25.38 kg/m2   GENERAL APPEARANCE:  Alert, in no distress, oriented  ENT:  Mouth and posterior oropharynx normal, moist mucous membranes, normal hearing acuity  EYES:  EOM, conjunctivae, lids, pupils and irises normal  NECK:  No adenopathy,masses or thyromegaly  RESP:  respiratory effort and palpation of chest normal, lungs clear to auscultation , no respiratory distress  CV:  Palpation and auscultation of heart done , peripheral edema 2-3+ in bilateral lower legs, legs firm, hair absent, irregular rhythm (afib), grade 3/6 murmur  ABDOMEN:  normal bowel sounds, soft, nontender, no hepatosplenomegaly or other masses  SKIN:  Inspection of skin and subcutaneous tissue baseline, Palpation of skin and subcutaneous tissue baseline  PSYCH:  oriented X 3, normal insight, judgement and memory, affect and mood normal      Recent Labs:     CBC RESULTS:   Recent Labs   Lab Test  04/13/18   0617  04/11/18   1752   WBC  4.3  4.0   RBC  3.23*  3.65*   HGB  8.9*  10.2*   HCT  30.0*  33.6*   MCV  93  92   MCH  27.6  27.9   MCHC  29.7*  30.4*   RDW  16.7*  16.3*   PLT  139*  185       Last Basic Metabolic Panel:  Recent Labs   Lab Test  04/18/18   0604  04/15/18   0625   NA  140  140   POTASSIUM  3.7  3.7   CHLORIDE  105  106   NAVARRO  9.1  8.8   CO2  27  26   BUN  62*  57*   CR  1.44*  1.45*   GLC  97  102*       Liver Function Studies -   Recent Labs   Lab Test  04/11/18   1752  02/13/18   2159   PROTTOTAL  7.1  7.2   ALBUMIN  3.7  3.4   BILITOTAL  1.0  1.0   ALKPHOS  93  76   AST  13  13   ALT  17  17       Lab Results   Component Value Date    A1C 5.7 02/26/2010     Lab Results   Component Value Date    INR 2.67 04/18/2018    INR 2.81 04/16/2018    INR 2.55 04/15/2018    INR 2.24 04/14/2018    INR 2.12 04/13/2018    INR 2.47 04/12/2018       Assessment/Plan:  (J96.01) Acute hypoxemic respiratory failure (H)  (primary encounter diagnosis)  (I50.33) Acute on  chronic diastolic heart failure (H)  (I35.0) Moderate aortic stenosis  Comment: Improving. Kidney function stable.   Plan: Lymphedema therapy eval and treat, conservatively due to risk for pulmonary edema. Continue current medications. Monitor BMP. Monitor weights q day, order written to obtain weight prior to breakfast. Call provider if greater than 3 pound gain from previous weight. Monitor for shortness of breath, wheezing, increasing lower extremity edema and change in activity tolerance.     (I48.0) Paroxysmal atrial fibrillation (H)  Comment: Rate controlled. INR therapeutic for goal 2-3  Plan: Continue current POC with no changes at this time.    (I10) Essential hypertension  Comment: Current medications are for AS, CHF. No symptomatic hypotension at this point  Plan: Monitor BP. Monitor for hypotensive s/sx    (R53.81) Physical deconditioning  Comment: Due to acute illness. Plan is to discharge home when goals met  Plan: PT/OT eval and treat, discharge planning per their recommendations.      Orders:  Continue current coumadin orders  INR, BMP 5/9/18        Electronically signed by  YURIY Wild [facility]  Pager: [phone]

## 2018-04-25 NOTE — LETTER
4/25/2018        RE: Jayne Nino  [address]  [facility] [id number]        [place] [facility]    Chief Complaint   Patient presents with     RECHECK       HPI:    Jayne Nino is a 89 year old  (6/23/1927), who is being seen today for an episodic care visit at [facility]. Hospital stay was at  [facility] from 4/11/18 through 4/16/18. 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 She was treated for CHF exacerbation likely related to recently stopping metolazone.  Admitted to this facility for  rehab, medical management and nursing care.      HPI information obtained from: facility chart records, facility staff, patient report and [facility] chart review.    Today's concern is:  Acute hypoxemic respiratory failure (H)  Acute on chronic diastolic heart failure (H)  Moderate aortic stenosis  She says her dyspnea is still present but is improved. No hypoxia. Ongoing edema which is not typical for her. Lymphedema therapy is seeing her, but being very conservative in order to avoid centralizing the fluid into her lungs. They have ordered TG shape stockings. She has had no compression garments at this point.    Wt Readings from Last 4 Encounters:   04/25/18 152 lb 8 oz (69.2 kg)   04/18/18 157 lb (71.2 kg)   04/16/18 155 lb 1.6 oz (70.4 kg)   02/14/18 146 lb 3.2 oz (66.3 kg)       Paroxysmal atrial fibrillation (H)  HR 65-89. Current Coumadin dosing 2.5mg po Sun/Tues ; 5mg po all other days. INR 2.67 4/18. INR 2.84 today.    Essential hypertension  Denies dizziness  BP readings range:  116/73  115/64  134/78  115/66  132/69  112/43  93/57  140/77  121/65  119/71  104/66      Physical deconditioning  She plans to return home with her son. Therapy reports that she is walking 120 feet x 2 with a rest break. Supervision with transfers,  walking.      ALLERGIES: Oxycodone  Past Medical, Surgical, Family and Social History reviewed and updated in [facility].    Current Outpatient Prescriptions   Medication Sig Dispense Refill     acetaminophen (TYLENOL) 325 MG tablet Take 650 mg by mouth every 6 hours as needed  250 tablet      ALLOPURINOL PO Take 100 mg by mouth daily       atorvastatin (LIPITOR) 80 MG tablet Take 80 mg by mouth At Bedtime       bumetanide (BUMEX) 1 MG tablet Take 2 tablets (2 mg) by mouth daily as needed For 5 lb weight gain 180 tablet 1     bumetanide (BUMEX) 2 MG tablet Take 1 tablet (2 mg) by mouth daily 60 tablet 0     carvedilol (COREG) 25 MG tablet Take 25 mg by mouth 2 times daily       DIGOXIN PO Take 125 mcg by mouth every 48 hours       famotidine (PEPCID) 20 MG tablet Take 20 mg by mouth daily        ferrous sulfate 140 (45 FE) MG TBCR Take 140 mg by mouth daily       levothyroxine (SYNTHROID, LEVOTHROID) 125 MCG tablet Take 1 tablet by mouth daily. 90 tablet 1     Menthol, Topical Analgesic, (ICY HOT EX) Externally apply topically 2 times daily Knees, shoulders, back       metolazone (ZAROXOLYN) 2.5 MG tablet Take 1 tablet (2.5 mg) by mouth daily as needed For 10 lb weight gain 90 tablet 1     Multiple Vitamins-Minerals (MULTIVITAMIN ADULT PO) Take 1 tablet by mouth daily       polyethylene glycol 0.4%- propylene glycol 0.3% (SYSTANE ULTRA) 0.4-0.3 % SOLN ophthalmic solution Place 1 drop into both eyes every hour as needed for dry eyes       VITAMIN D, CHOLECALCIFEROL, PO Take 1,000 Units by mouth daily        warfarin (COUMADIN) 5 MG tablet Take by mouth daily Take as directed per INR       Medications reviewed:  Medications reconciled to facility chart and changes were made to reflect current medications as identified as above med list.    REVIEW OF SYSTEMS:  10 point ROS of systems including Constitutional, Eyes, Respiratory, Cardiovascular, Gastroenterology, Genitourinary, Integumentary, Muscularskeletal, Psychiatric were  all negative except for pertinent positives noted in my HPI.    Physical Exam:  /73  Pulse 89  Temp 97.5  F (36.4  C)  Resp 17  Wt 152 lb 8 oz (69.2 kg)  SpO2 95%  BMI 25.38 kg/m2   GENERAL APPEARANCE:  Alert, in no distress, oriented  ENT:  Mouth and posterior oropharynx normal, moist mucous membranes, normal hearing acuity  EYES:  EOM, conjunctivae, lids, pupils and irises normal  NECK:  No adenopathy,masses or thyromegaly  RESP:  respiratory effort and palpation of chest normal, lungs clear to auscultation , no respiratory distress  CV:  Palpation and auscultation of heart done , peripheral edema 2-3+ in bilateral lower legs, legs firm, hair absent, irregular rhythm (afib), grade 3/6 murmur  ABDOMEN:  normal bowel sounds, soft, nontender, no hepatosplenomegaly or other masses  SKIN:  Inspection of skin and subcutaneous tissue baseline, Palpation of skin and subcutaneous tissue baseline  PSYCH:  oriented X 3, normal insight, judgement and memory, affect and mood normal      Recent Labs:     CBC RESULTS:   Recent Labs   Lab Test  04/13/18   0617  04/11/18   1752   WBC  4.3  4.0   RBC  3.23*  3.65*   HGB  8.9*  10.2*   HCT  30.0*  33.6*   MCV  93  92   MCH  27.6  27.9   MCHC  29.7*  30.4*   RDW  16.7*  16.3*   PLT  139*  185       Last Basic Metabolic Panel:  Recent Labs   Lab Test  04/18/18   0604  04/15/18   0625   NA  140  140   POTASSIUM  3.7  3.7   CHLORIDE  105  106   NAVARRO  9.1  8.8   CO2  27  26   BUN  62*  57*   CR  1.44*  1.45*   GLC  97  102*       Liver Function Studies -   Recent Labs   Lab Test  04/11/18   1752  02/13/18   2159   PROTTOTAL  7.1  7.2   ALBUMIN  3.7  3.4   BILITOTAL  1.0  1.0   ALKPHOS  93  76   AST  13  13   ALT  17  17       Lab Results   Component Value Date    A1C 5.7 02/26/2010     Lab Results   Component Value Date    INR 2.67 04/18/2018    INR 2.81 04/16/2018    INR 2.55 04/15/2018    INR 2.24 04/14/2018    INR 2.12 04/13/2018    INR 2.47 04/12/2018        Assessment/Plan:  (J96.01) Acute hypoxemic respiratory failure (H)  (primary encounter diagnosis)  (I50.33) Acute on chronic diastolic heart failure (H)  (I35.0) Moderate aortic stenosis  Comment: Improving. Kidney function stable.   Plan: Lymphedema therapy eval and treat, conservatively due to risk for pulmonary edema. Continue current medications. Monitor BMP. Monitor weights q day, order written to obtain weight prior to breakfast. Call provider if greater than 3 pound gain from previous weight. Monitor for shortness of breath, wheezing, increasing lower extremity edema and change in activity tolerance.     (I48.0) Paroxysmal atrial fibrillation (H)  Comment: Rate controlled. INR therapeutic for goal 2-3  Plan: Continue current POC with no changes at this time.    (I10) Essential hypertension  Comment: Current medications are for AS, CHF. No symptomatic hypotension at this point  Plan: Monitor BP. Monitor for hypotensive s/sx    (R53.81) Physical deconditioning  Comment: Due to acute illness. Plan is to discharge home when goals met  Plan: PT/OT eval and treat, discharge planning per their recommendations.      Orders:  Continue current coumadin orders  INR, BMP 5/9/18        Electronically signed by  YURIY Wild [facility]  Pager: [phone]

## 2018-05-02 ENCOUNTER — NURSING HOME VISIT (OUTPATIENT)
Dept: GERIATRICS | Facility: CLINIC | Age: 83
End: 2018-05-02
Payer: COMMERCIAL

## 2018-05-02 VITALS
HEART RATE: 87 BPM | OXYGEN SATURATION: 94 % | BODY MASS INDEX: 25.38 KG/M2 | SYSTOLIC BLOOD PRESSURE: 103 MMHG | TEMPERATURE: 97.1 F | WEIGHT: 152.5 LBS | RESPIRATION RATE: 20 BRPM | DIASTOLIC BLOOD PRESSURE: 63 MMHG

## 2018-05-02 VITALS
BODY MASS INDEX: 25.38 KG/M2 | HEART RATE: 87 BPM | SYSTOLIC BLOOD PRESSURE: 103 MMHG | OXYGEN SATURATION: 94 % | RESPIRATION RATE: 20 BRPM | WEIGHT: 152.5 LBS | TEMPERATURE: 97.1 F | DIASTOLIC BLOOD PRESSURE: 63 MMHG

## 2018-05-02 DIAGNOSIS — I50.33 ACUTE ON CHRONIC DIASTOLIC HEART FAILURE (H): Primary | ICD-10-CM

## 2018-05-02 DIAGNOSIS — I10 ESSENTIAL HYPERTENSION: ICD-10-CM

## 2018-05-02 DIAGNOSIS — R53.81 PHYSICAL DECONDITIONING: ICD-10-CM

## 2018-05-02 DIAGNOSIS — I48.0 PAROXYSMAL ATRIAL FIBRILLATION (H): ICD-10-CM

## 2018-05-02 DIAGNOSIS — I35.0 MODERATE AORTIC STENOSIS: ICD-10-CM

## 2018-05-02 PROCEDURE — 99309 SBSQ NF CARE MODERATE MDM 30: CPT | Performed by: NURSE PRACTITIONER

## 2018-05-02 NOTE — PROGRESS NOTES
[place] [facility]    Chief Complaint   Patient presents with     RECHECK       HPI:    Jayne Nino is a 89 year old  (6/23/1927), who is being seen today for an episodic care visit at [facility]. Hospital stay was at  [facility] from 4/11/18 through 4/16/18. 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 She was treated for CHF exacerbation likely related to recently stopping metolazone.  Admitted to this facility for  rehab, medical management and nursing care.      HPI information obtained from: facility chart records, facility staff, patient report and [facility] Epic chart review.    Today's concern is:  Acute on chronic diastolic heart failure (H)  Moderate aortic stenosis  She says her dyspnea is improved. No hypoxia. Ongoing edema which is not typical for her. Lymphedema therapy is seeing her, but being very conservative in order to avoid centralizing the fluid into her lungs. They have ordered TG shape stockings. She has had no compression garments at this point.    Wt Readings from Last 4 Encounters:   05/02/18 152 lb 8 oz (69.2 kg)   04/25/18 152 lb 8 oz (69.2 kg)   04/18/18 157 lb (71.2 kg)   04/16/18 155 lb 1.6 oz (70.4 kg)       Paroxysmal atrial fibrillation (H)  HR 65-89. Current Coumadin dosing 2.5mg po Sun/Tues ; 5mg po all other days. INR 2.67 4/18. INR 2.86 on 4/25.    Essential hypertension  Denies dizziness  BP readings range:  103/63  132/74  133/67  106/68  129/62  116/73  115/64  134/78  115/66  132/69  112/43      Physical deconditioning  Care conference was held this morning. She wishes to return home with her son. She is upset because therapy wants to do a home assessment first and she does not feel this is necessary. They want her to have home care as well, but she doesn't want this, she wants to be able to get out of the house. In discussing  the home assessment, she is mostly concerned about being judged on the cleanliness of her home, says it needs to be painted. When she is advised that the home assessment is strictly evaluating safety concerns, she says she may be open to it.       ALLERGIES: Oxycodone  Past Medical, Surgical, Family and Social History reviewed and updated in [facility].    Current Outpatient Prescriptions   Medication Sig Dispense Refill     acetaminophen (TYLENOL) 325 MG tablet Take 650 mg by mouth every 6 hours as needed  250 tablet      atorvastatin (LIPITOR) 80 MG tablet Take 80 mg by mouth At Bedtime       bumetanide (BUMEX) 1 MG tablet Take 2 tablets (2 mg) by mouth daily as needed For 5 lb weight gain 180 tablet 1     bumetanide (BUMEX) 2 MG tablet Take 1 tablet (2 mg) by mouth daily 60 tablet 0     camphor-menthol (DERMASARRA) 0.5-0.5 % LOTN Apply topically 2 times daily And apply PRN       carvedilol (COREG) 25 MG tablet Take 25 mg by mouth 2 times daily       DIGOXIN PO Take 125 mcg by mouth every 48 hours       famotidine (PEPCID) 20 MG tablet Take 20 mg by mouth daily        ferrous sulfate 140 (45 FE) MG TBCR Take 140 mg by mouth daily       levothyroxine (SYNTHROID, LEVOTHROID) 125 MCG tablet Take 1 tablet by mouth daily. 90 tablet 1     Menthol, Topical Analgesic, (ICY HOT EX) Externally apply topically 2 times daily Knees, shoulders, back       metolazone (ZAROXOLYN) 2.5 MG tablet Take 1 tablet (2.5 mg) by mouth daily as needed For 10 lb weight gain 90 tablet 1     Multiple Vitamins-Minerals (MULTIVITAMIN ADULT PO) Take 1 tablet by mouth daily       polyethylene glycol 0.4%- propylene glycol 0.3% (SYSTANE ULTRA) 0.4-0.3 % SOLN ophthalmic solution Place 1 drop into both eyes every hour as needed for dry eyes       VITAMIN D, CHOLECALCIFEROL, PO Take 1,000 Units by mouth daily        warfarin (COUMADIN) 5 MG tablet Take by mouth daily Take as directed per INR       Medications reviewed:  Medications reconciled to facility  chart and changes were made to reflect current medications as identified as above med list.    REVIEW OF SYSTEMS:  10 point ROS of systems including Constitutional, Eyes, Respiratory, Cardiovascular, Gastroenterology, Genitourinary, Integumentary, Muscularskeletal, Psychiatric were all negative except for pertinent positives noted in my HPI.    Physical Exam:  /63  Pulse 87  Temp 97.1  F (36.2  C)  Resp 20  Wt 152 lb 8 oz (69.2 kg)  SpO2 94%  BMI 25.38 kg/m2   GENERAL APPEARANCE:  Alert, in no distress, oriented  ENT:  Mouth and posterior oropharynx normal, moist mucous membranes, normal hearing acuity  EYES:  EOM, conjunctivae, lids, pupils and irises normal  NECK:  No adenopathy,masses or thyromegaly  RESP:  respiratory effort and palpation of chest normal, lungs clear to auscultation , no respiratory distress  CV:  Palpation and auscultation of heart done , peripheral edema 2+ in bilateral lower legs, legs firm, hair absent, irregular rhythm (afib), grade 3/6 murmur  ABDOMEN:  normal bowel sounds, soft, nontender, no hepatosplenomegaly or other masses  SKIN:  Inspection of skin and subcutaneous tissue baseline, Palpation of skin and subcutaneous tissue baseline  PSYCH:  oriented X 3, normal insight, judgement and memory, affect and mood normal      Recent Labs:     CBC RESULTS:   Recent Labs   Lab Test  04/13/18 0617 04/11/18   1752   WBC  4.3  4.0   RBC  3.23*  3.65*   HGB  8.9*  10.2*   HCT  30.0*  33.6*   MCV  93  92   MCH  27.6  27.9   MCHC  29.7*  30.4*   RDW  16.7*  16.3*   PLT  139*  185       Last Basic Metabolic Panel:  Recent Labs   Lab Test  04/18/18   0604  04/15/18   0625   NA  140  140   POTASSIUM  3.7  3.7   CHLORIDE  105  106   NAVARRO  9.1  8.8   CO2  27  26   BUN  62*  57*   CR  1.44*  1.45*   GLC  97  102*       Liver Function Studies -   Recent Labs   Lab Test  04/11/18   1752  02/13/18   2159   PROTTOTAL  7.1  7.2   ALBUMIN  3.7  3.4   BILITOTAL  1.0  1.0   ALKPHOS  93  76   AST   13  13   ALT  17  17       Lab Results   Component Value Date    INR 2.86 04/25/2018    INR 2.67 04/18/2018    INR 2.81 04/16/2018    INR 2.55 04/15/2018    INR 2.24 04/14/2018    INR 2.12 04/13/2018    INR 2.47 04/12/2018       Assessment/Plan:  (I50.33) Acute on chronic diastolic heart failure (H) (primary encounter diagnosis)  (I35.0) Moderate aortic stenosis  Comment: Improving. Kidney function stable. Edema does appears improved on exam, weight is down.  Plan: Lymphedema therapy eval and treat, conservatively due to risk for pulmonary edema. Continue current medications. Monitor BMP. Monitor weights q day. Call provider if greater than 3 pound gain from previous weight. Monitor for shortness of breath, wheezing, increasing lower extremity edema and change in activity tolerance.     (I48.0) Paroxysmal atrial fibrillation (H)  Comment: Rate controlled. INR therapeutic for goal 2-3  Plan: Continue current POC with no changes at this time.    (I10) Essential hypertension  Comment: Current medications are for AS, CHF. No symptomatic hypotension at this point  Plan: Monitor BP. Monitor for hypotensive s/sx    (R53.81) Physical deconditioning  Comment: Due to acute illness. Plan is to discharge home when goals met. Advised patient and son that discharge orders are signed based on PT/OT recommendations, advised home assessment  Plan: PT/OT eval and treat, discharge planning per their recommendations.      Electronically signed by  YURIY Wild [facility]  Pager: [phone]

## 2018-05-02 NOTE — LETTER
5/2/2018        RE: Jayne Nino  [address]  [facility] [address]        [place] [facility]    Chief Complaint   Patient presents with     RECHECK       HPI:    Jayne Nino is a 89 year old  (6/23/1927), who is being seen today for an episodic care visit at [facility] [place]. Hospital stay was at  [facility] from 4/11/18 through 4/16/18. 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 She was treated for CHF exacerbation likely related to recently stopping metolazone.  Admitted to this facility for  rehab, medical management and nursing care.      HPI information obtained from: facility chart records, facility staff, patient report and [facility] chart review.    Today's concern is:  Acute on chronic diastolic heart failure (H)  Moderate aortic stenosis  She says her dyspnea is improved. No hypoxia. Ongoing edema which is not typical for her. Lymphedema therapy is seeing her, but being very conservative in order to avoid centralizing the fluid into her lungs. They have ordered TG shape stockings. She has had no compression garments at this point.    Wt Readings from Last 4 Encounters:   05/02/18 152 lb 8 oz (69.2 kg)   04/25/18 152 lb 8 oz (69.2 kg)   04/18/18 157 lb (71.2 kg)   04/16/18 155 lb 1.6 oz (70.4 kg)       Paroxysmal atrial fibrillation (H)  HR 65-89. Current Coumadin dosing 2.5mg po Sun/Tues ; 5mg po all other days. INR 2.67 4/18. INR 2.86 on 4/25.    Essential hypertension  Denies dizziness  BP readings range:  103/63  132/74  133/67  106/68  129/62  116/73  115/64  134/78  115/66  132/69  112/43      Physical deconditioning  Care conference was held this morning. She wishes to return home with her son. She is upset because therapy wants to do a home assessment first and she does not feel this is necessary. They want her to have home care  as well, but she doesn't want this, she wants to be able to get out of the house. In discussing the home assessment, she is mostly concerned about being judged on the cleanliness of her home, says it needs to be painted. When she is advised that the home assessment is strictly evaluating safety concerns, she says she may be open to it.       ALLERGIES: Oxycodone  Past Medical, Surgical, Family and Social History reviewed and updated in [facility].    Current Outpatient Prescriptions   Medication Sig Dispense Refill     acetaminophen (TYLENOL) 325 MG tablet Take 650 mg by mouth every 6 hours as needed  250 tablet      atorvastatin (LIPITOR) 80 MG tablet Take 80 mg by mouth At Bedtime       bumetanide (BUMEX) 1 MG tablet Take 2 tablets (2 mg) by mouth daily as needed For 5 lb weight gain 180 tablet 1     bumetanide (BUMEX) 2 MG tablet Take 1 tablet (2 mg) by mouth daily 60 tablet 0     camphor-menthol (DERMASARRA) 0.5-0.5 % LOTN Apply topically 2 times daily And apply PRN       carvedilol (COREG) 25 MG tablet Take 25 mg by mouth 2 times daily       DIGOXIN PO Take 125 mcg by mouth every 48 hours       famotidine (PEPCID) 20 MG tablet Take 20 mg by mouth daily        ferrous sulfate 140 (45 FE) MG TBCR Take 140 mg by mouth daily       levothyroxine (SYNTHROID, LEVOTHROID) 125 MCG tablet Take 1 tablet by mouth daily. 90 tablet 1     Menthol, Topical Analgesic, (ICY HOT EX) Externally apply topically 2 times daily Knees, shoulders, back       metolazone (ZAROXOLYN) 2.5 MG tablet Take 1 tablet (2.5 mg) by mouth daily as needed For 10 lb weight gain 90 tablet 1     Multiple Vitamins-Minerals (MULTIVITAMIN ADULT PO) Take 1 tablet by mouth daily       polyethylene glycol 0.4%- propylene glycol 0.3% (SYSTANE ULTRA) 0.4-0.3 % SOLN ophthalmic solution Place 1 drop into both eyes every hour as needed for dry eyes       VITAMIN D, CHOLECALCIFEROL, PO Take 1,000 Units by mouth daily        warfarin (COUMADIN) 5 MG tablet Take by mouth  daily Take as directed per INR       Medications reviewed:  Medications reconciled to facility chart and changes were made to reflect current medications as identified as above med list.    REVIEW OF SYSTEMS:  10 point ROS of systems including Constitutional, Eyes, Respiratory, Cardiovascular, Gastroenterology, Genitourinary, Integumentary, Muscularskeletal, Psychiatric were all negative except for pertinent positives noted in my HPI.    Physical Exam:  /63  Pulse 87  Temp 97.1  F (36.2  C)  Resp 20  Wt 152 lb 8 oz (69.2 kg)  SpO2 94%  BMI 25.38 kg/m2   GENERAL APPEARANCE:  Alert, in no distress, oriented  ENT:  Mouth and posterior oropharynx normal, moist mucous membranes, normal hearing acuity  EYES:  EOM, conjunctivae, lids, pupils and irises normal  NECK:  No adenopathy,masses or thyromegaly  RESP:  respiratory effort and palpation of chest normal, lungs clear to auscultation , no respiratory distress  CV:  Palpation and auscultation of heart done , peripheral edema 2+ in bilateral lower legs, legs firm, hair absent, irregular rhythm (afib), grade 3/6 murmur  ABDOMEN:  normal bowel sounds, soft, nontender, no hepatosplenomegaly or other masses  SKIN:  Inspection of skin and subcutaneous tissue baseline, Palpation of skin and subcutaneous tissue baseline  PSYCH:  oriented X 3, normal insight, judgement and memory, affect and mood normal      Recent Labs:     CBC RESULTS:   Recent Labs   Lab Test  04/13/18 0617  04/11/18 1752   WBC  4.3  4.0   RBC  3.23*  3.65*   HGB  8.9*  10.2*   HCT  30.0*  33.6*   MCV  93  92   MCH  27.6  27.9   MCHC  29.7*  30.4*   RDW  16.7*  16.3*   PLT  139*  185       Last Basic Metabolic Panel:  Recent Labs   Lab Test  04/18/18   0604  04/15/18   0625   NA  140  140   POTASSIUM  3.7  3.7   CHLORIDE  105  106   NAVARRO  9.1  8.8   CO2  27  26   BUN  62*  57*   CR  1.44*  1.45*   GLC  97  102*       Liver Function Studies -   Recent Labs   Lab Test  04/11/18   1752  02/13/18    2159   PROTTOTAL  7.1  7.2   ALBUMIN  3.7  3.4   BILITOTAL  1.0  1.0   ALKPHOS  93  76   AST  13  13   ALT  17  17       Lab Results   Component Value Date    INR 2.86 04/25/2018    INR 2.67 04/18/2018    INR 2.81 04/16/2018    INR 2.55 04/15/2018    INR 2.24 04/14/2018    INR 2.12 04/13/2018    INR 2.47 04/12/2018       Assessment/Plan:  (I50.33) Acute on chronic diastolic heart failure (H) (primary encounter diagnosis)  (I35.0) Moderate aortic stenosis  Comment: Improving. Kidney function stable. Edema does appears improved on exam, weight is down.  Plan: Lymphedema therapy eval and treat, conservatively due to risk for pulmonary edema. Continue current medications. Monitor BMP. Monitor weights q day. Call provider if greater than 3 pound gain from previous weight. Monitor for shortness of breath, wheezing, increasing lower extremity edema and change in activity tolerance.     (I48.0) Paroxysmal atrial fibrillation (H)  Comment: Rate controlled. INR therapeutic for goal 2-3  Plan: Continue current POC with no changes at this time.    (I10) Essential hypertension  Comment: Current medications are for AS, CHF. No symptomatic hypotension at this point  Plan: Monitor BP. Monitor for hypotensive s/sx    (R53.81) Physical deconditioning  Comment: Due to acute illness. Plan is to discharge home when goals met. Advised patient and son that discharge orders are signed based on PT/OT recommendations, advised home assessment  Plan: PT/OT eval and treat, discharge planning per their recommendations.      Electronically signed by  YURIY Wild [facility]  Pager: [phone]

## 2018-05-03 ENCOUNTER — NURSING HOME VISIT (OUTPATIENT)
Dept: GERIATRICS | Facility: CLINIC | Age: 83
End: 2018-05-03
Payer: COMMERCIAL

## 2018-05-03 DIAGNOSIS — I10 BENIGN ESSENTIAL HYPERTENSION: ICD-10-CM

## 2018-05-03 DIAGNOSIS — N18.30 CKD (CHRONIC KIDNEY DISEASE) STAGE 3, GFR 30-59 ML/MIN (H): ICD-10-CM

## 2018-05-03 DIAGNOSIS — I35.1 MODERATE AORTIC INSUFFICIENCY: ICD-10-CM

## 2018-05-03 DIAGNOSIS — Z95.2 S/P AVR: ICD-10-CM

## 2018-05-03 DIAGNOSIS — D50.9 IRON DEFICIENCY ANEMIA, UNSPECIFIED IRON DEFICIENCY ANEMIA TYPE: ICD-10-CM

## 2018-05-03 DIAGNOSIS — I48.20 CHRONIC ATRIAL FIBRILLATION (H): ICD-10-CM

## 2018-05-03 DIAGNOSIS — K21.9 GASTROESOPHAGEAL REFLUX DISEASE WITHOUT ESOPHAGITIS: ICD-10-CM

## 2018-05-03 DIAGNOSIS — E78.5 HYPERLIPIDEMIA, UNSPECIFIED HYPERLIPIDEMIA TYPE: ICD-10-CM

## 2018-05-03 DIAGNOSIS — I50.32 CHRONIC DIASTOLIC CONGESTIVE HEART FAILURE (H): Primary | ICD-10-CM

## 2018-05-03 DIAGNOSIS — R53.81 PHYSICAL DECONDITIONING: ICD-10-CM

## 2018-05-03 DIAGNOSIS — E03.9 HYPOTHYROIDISM, UNSPECIFIED TYPE: ICD-10-CM

## 2018-05-03 PROCEDURE — 99306 1ST NF CARE HIGH MDM 50: CPT | Performed by: INTERNAL MEDICINE

## 2018-05-03 NOTE — PROGRESS NOTES
[place] [facility]  INITIAL VISIT NOTE  May 3, 2018    PRIMARY CARE PROVIDER AND CLINIC:  Dinora Jimenez [facility] [address] / [facility] *    Chief Complaint   Patient presents with     Hospital F/U       HPI:    Jayne Nino is a 89 year old  (6/23/1927) female who was seen at [facility] on May 3, 2018 for an initial visit. Medical history is notable for aortic stenosis s/p AVR (2011), diastolic CHF (EF 60-65%), HTN, HLD, hypothyroidism and CKD. She was hospitalized at [facility] from 4/11/18 to 4/16/18 where she presented with dyspnea, orthopnea and increasing LE edema and was treated for decompensated CHF. Metolazone was d/c one month prior and this was thought to have contributed. TTE with moderate prosthetic stenosis with moderate aortic insufficiency. She was admitted to this facility for medical management and rehab.     Today, Ms. Nino is seen in her room after therapy. Tentative d/c is early next week, but therapies would like to do a home visit first. She lives with her son. She does not understand why she cannot just go home now as she doesn't go up/down the outside steps without help and there is furniture to hang on to around the house. No chest pain or dyspnea. No abdominal pain. No diarrhea or constipation. Notes improved LE edema. Working with therapies.     CODE STATUS:   DNR / DNI    ALLERGIES:     Allergies   Allergen Reactions     Oxycodone      Pt states she takes it for back pain, only causes itching       PAST MEDICAL HISTORY:   Past Medical History:   Diagnosis Date     A-fib (H)      Breast cancer (H)      Gout      Heart disease      High cholesterol      Hypothyroid        PAST SURGICAL HISTORY:   Past Surgical History:   Procedure Laterality Date     ORTHOPEDIC SURGERY       REPLACE VALVE AORTIC         FAMILY HISTORY:   Family History   Problem Relation Age of Onset     Coronary Artery Disease Mother      Coronary Artery Disease  Father      CEREBROVASCULAR DISEASE Mother      Breast Cancer No family hx of      Colon Cancer No family hx of      Other Cancer No family hx of        SOCIAL HISTORY:   Lives with family     MEDICATIONS:  Current Outpatient Prescriptions   Medication Sig Dispense Refill     acetaminophen (TYLENOL) 325 MG tablet Take 650 mg by mouth every 6 hours as needed  250 tablet      atorvastatin (LIPITOR) 80 MG tablet Take 80 mg by mouth At Bedtime       bumetanide (BUMEX) 1 MG tablet Take 2 tablets (2 mg) by mouth daily as needed For 5 lb weight gain 180 tablet 1     bumetanide (BUMEX) 2 MG tablet Take 1 tablet (2 mg) by mouth daily 60 tablet 0     camphor-menthol (DERMASARRA) 0.5-0.5 % LOTN Apply topically 2 times daily And apply PRN       carvedilol (COREG) 25 MG tablet Take 25 mg by mouth 2 times daily       DIGOXIN PO Take 125 mcg by mouth every 48 hours       famotidine (PEPCID) 20 MG tablet Take 20 mg by mouth daily        ferrous sulfate 140 (45 FE) MG TBCR Take 140 mg by mouth daily       levothyroxine (SYNTHROID, LEVOTHROID) 125 MCG tablet Take 1 tablet by mouth daily. 90 tablet 1     Menthol, Topical Analgesic, (ICY HOT EX) Externally apply topically 2 times daily Knees, shoulders, back       metolazone (ZAROXOLYN) 2.5 MG tablet Take 1 tablet (2.5 mg) by mouth daily as needed For 10 lb weight gain 90 tablet 1     Multiple Vitamins-Minerals (MULTIVITAMIN ADULT PO) Take 1 tablet by mouth daily       polyethylene glycol 0.4%- propylene glycol 0.3% (SYSTANE ULTRA) 0.4-0.3 % SOLN ophthalmic solution Place 1 drop into both eyes every hour as needed for dry eyes       VITAMIN D, CHOLECALCIFEROL, PO Take 1,000 Units by mouth daily        warfarin (COUMADIN) 5 MG tablet Take by mouth daily Take as directed per INR         Post Discharge Medication Reconciliation Status: medication reconcilation previously completed during another office visit.    ROS:  10 point ROS neg other than the symptoms noted above in the  HPI.    PHYSICAL EXAM:  /63  Pulse 87  Temp 97.1  F (36.2  C)  Resp 20  Wt 152 lb 8 oz (69.2 kg)  SpO2 94%  BMI 25.38 kg/m2  Gen: sitting in chair, alert, cooperative and in no acute distress  HEENT: normocephalic; oropharynx clear  Card: RRR, S1, S2, no murmurs  Resp: lungs clear to auscultation bilaterally, no crackles or wheezes  GI: abdomen soft, not-tender  MSK: normal muscle tone, 1+ bialteral LE edema  Neuro: CX II-XII grossly in tact; ROM in all four extremities grossly in tact  Psych: alert and oriented x3; normal affect    LABORATORY/IMAGING DATA:  Reviewed as per Epic    ASSESSMENT/PLAN:    Diastolic CHF (EF 60-65%) / HTN / HLD  SBPs labile in 110s-130s. Wts stable in 154-156 lb range. No hypoxia. No dyspnea. Has 1+ bilateral LE edema. Lungs clear.   -- continues on atorvastatin 80 mg qhs, bumetanide 2 mg daily and 2 mg daily PRN for 5 lb wt gain, carvedilol 25 mg BID and metolazone 2.5 mg daily PRN for weight gain 10 lbs  -- follow BPs, weights, clinical volume status  -- lymphedema therapies are following for LE edema and Tenso shapers are on order    S/p AVR (2011) with Moderate Aortic Insufficiency  TTE with moderate prosthetic stenosis with moderate aortic insufficiency. She is unsure about a TAVR, but would like to learn more.   -- follow up with cardiology as scheduled    Chronic Atrial Fibrillation  HR 80s  -- rate controlled with carvedilol 25 mg BID and digoxin 125 mcg q48h  -- anticoagulated with warfarin, goal INR 2-3    Hypothyroidisim  TSH not available in Epic  -- continues on levothyroxine 125 mcg daily    Fe Deficiency Anemia  -- continues on FeSO4 140 mg daily     GERD  -- continues on famotidine 20 mg daily    CKD, Stage III  Baseline Cr 1.3-1.45 range  -- avoid nephrotoxic meds  -- periodic BMP    Physical Deconditioning  In setting of hospitalization and underlying medical conditions  -- ongoing PT/OT        Electronically signed by:  Milagro Robertson MD

## 2018-05-03 NOTE — LETTER
5/3/2018        RE: Jayne Nino  [address]  [facility] [address]        [place] [facility]  INITIAL VISIT NOTE  May 3, 2018    PRIMARY CARE PROVIDER AND CLINIC:  Dinora Jimenez [facility] [address] / [facility] *    Chief Complaint   Patient presents with     Hospital F/U       HPI:    Jayne Nino is a 89 year old  (6/23/1927) female who was seen at [facility] on May 3, 2018 for an initial visit. Medical history is notable for aortic stenosis s/p AVR (2011), diastolic CHF (EF 60-65%), HTN, HLD, hypothyroidism and CKD. She was hospitalized at [facility] from 4/11/18 to 4/16/18 where she presented with dyspnea, orthopnea and increasing LE edema and was treated for decompensated CHF. Metolazone was d/c one month prior and this was thought to have contributed. TTE with moderate prosthetic stenosis with moderate aortic insufficiency. She was admitted to this facility for medical management and rehab.     Today, Ms. Nino is seen in her room after therapy. Tentative d/c is early next week, but therapies would like to do a home visit first. She lives with her son. She does not understand why she cannot just go home now as she doesn't go up/down the outside steps without help and there is furniture to hang on to around the house. No chest pain or dyspnea. No abdominal pain. No diarrhea or constipation. Notes improved LE edema. Working with therapies.     CODE STATUS:   DNR / DNI    ALLERGIES:     Allergies   Allergen Reactions     Oxycodone      Pt states she takes it for back pain, only causes itching       PAST MEDICAL HISTORY:   Past Medical History:   Diagnosis Date     A-fib (H)      Breast cancer (H)      Gout      Heart disease      High cholesterol      Hypothyroid        PAST SURGICAL HISTORY:   Past Surgical History:   Procedure Laterality Date     ORTHOPEDIC SURGERY       REPLACE VALVE AORTIC         FAMILY HISTORY:   Family History    Problem Relation Age of Onset     Coronary Artery Disease Mother      Coronary Artery Disease Father      CEREBROVASCULAR DISEASE Mother      Breast Cancer No family hx of      Colon Cancer No family hx of      Other Cancer No family hx of        SOCIAL HISTORY:   Lives with family     MEDICATIONS:  Current Outpatient Prescriptions   Medication Sig Dispense Refill     acetaminophen (TYLENOL) 325 MG tablet Take 650 mg by mouth every 6 hours as needed  250 tablet      atorvastatin (LIPITOR) 80 MG tablet Take 80 mg by mouth At Bedtime       bumetanide (BUMEX) 1 MG tablet Take 2 tablets (2 mg) by mouth daily as needed For 5 lb weight gain 180 tablet 1     bumetanide (BUMEX) 2 MG tablet Take 1 tablet (2 mg) by mouth daily 60 tablet 0     camphor-menthol (DERMASARRA) 0.5-0.5 % LOTN Apply topically 2 times daily And apply PRN       carvedilol (COREG) 25 MG tablet Take 25 mg by mouth 2 times daily       DIGOXIN PO Take 125 mcg by mouth every 48 hours       famotidine (PEPCID) 20 MG tablet Take 20 mg by mouth daily        ferrous sulfate 140 (45 FE) MG TBCR Take 140 mg by mouth daily       levothyroxine (SYNTHROID, LEVOTHROID) 125 MCG tablet Take 1 tablet by mouth daily. 90 tablet 1     Menthol, Topical Analgesic, (ICY HOT EX) Externally apply topically 2 times daily Knees, shoulders, back       metolazone (ZAROXOLYN) 2.5 MG tablet Take 1 tablet (2.5 mg) by mouth daily as needed For 10 lb weight gain 90 tablet 1     Multiple Vitamins-Minerals (MULTIVITAMIN ADULT PO) Take 1 tablet by mouth daily       polyethylene glycol 0.4%- propylene glycol 0.3% (SYSTANE ULTRA) 0.4-0.3 % SOLN ophthalmic solution Place 1 drop into both eyes every hour as needed for dry eyes       VITAMIN D, CHOLECALCIFEROL, PO Take 1,000 Units by mouth daily        warfarin (COUMADIN) 5 MG tablet Take by mouth daily Take as directed per INR         Post Discharge Medication Reconciliation Status: medication reconcilation previously completed during  another office visit.    ROS:  10 point ROS neg other than the symptoms noted above in the HPI.    PHYSICAL EXAM:  /63  Pulse 87  Temp 97.1  F (36.2  C)  Resp 20  Wt 152 lb 8 oz (69.2 kg)  SpO2 94%  BMI 25.38 kg/m2  Gen: sitting in chair, alert, cooperative and in no acute distress  HEENT: normocephalic; oropharynx clear  Card: RRR, S1, S2, no murmurs  Resp: lungs clear to auscultation bilaterally, no crackles or wheezes  GI: abdomen soft, not-tender  MSK: normal muscle tone, 1+ bialteral LE edema  Neuro: CX II-XII grossly in tact; ROM in all four extremities grossly in tact  Psych: alert and oriented x3; normal affect    LABORATORY/IMAGING DATA:  Reviewed as per Epic    ASSESSMENT/PLAN:    Diastolic CHF (EF 60-65%) / HTN / HLD  SBPs labile in 110s-130s. Wts stable in 154-156 lb range. No hypoxia. No dyspnea. Has 1+ bilateral LE edema. Lungs clear.   -- continues on atorvastatin 80 mg qhs, bumetanide 2 mg daily and 2 mg daily PRN for 5 lb wt gain, carvedilol 25 mg BID and metolazone 2.5 mg daily PRN for weight gain 10 lbs  -- follow BPs, weights, clinical volume status  -- lymphedema therapies are following for LE edema and Tenso shapers are on order    S/p AVR (2011) with Moderate Aortic Insufficiency  TTE with moderate prosthetic stenosis with moderate aortic insufficiency. She is unsure about a TAVR, but would like to learn more.   -- follow up with cardiology as scheduled    Chronic Atrial Fibrillation  HR 80s  -- rate controlled with carvedilol 25 mg BID and digoxin 125 mcg q48h  -- anticoagulated with warfarin, goal INR 2-3    Hypothyroidisim  TSH not available in Epic  -- continues on levothyroxine 125 mcg daily    Fe Deficiency Anemia  -- continues on FeSO4 140 mg daily     GERD  -- continues on famotidine 20 mg daily    CKD, Stage III  Baseline Cr 1.3-1.45 range  -- avoid nephrotoxic meds  -- periodic BMP    Physical Deconditioning  In setting of hospitalization and underlying medical  conditions  -- ongoing PT/OT        Electronically signed by:  Milagro Robertson MD

## 2018-05-09 ENCOUNTER — DISCHARGE SUMMARY NURSING HOME (OUTPATIENT)
Dept: GERIATRICS | Facility: CLINIC | Age: 83
End: 2018-05-09
Payer: COMMERCIAL

## 2018-05-09 ENCOUNTER — HOSPITAL LABORATORY (OUTPATIENT)
Dept: OTHER | Facility: CLINIC | Age: 83
End: 2018-05-09

## 2018-05-09 VITALS
RESPIRATION RATE: 16 BRPM | BODY MASS INDEX: 25.29 KG/M2 | TEMPERATURE: 97.3 F | HEART RATE: 74 BPM | WEIGHT: 152 LBS | DIASTOLIC BLOOD PRESSURE: 71 MMHG | SYSTOLIC BLOOD PRESSURE: 120 MMHG | OXYGEN SATURATION: 98 %

## 2018-05-09 DIAGNOSIS — I48.0 PAROXYSMAL ATRIAL FIBRILLATION (H): ICD-10-CM

## 2018-05-09 DIAGNOSIS — I35.0 MODERATE AORTIC STENOSIS: ICD-10-CM

## 2018-05-09 DIAGNOSIS — R53.81 PHYSICAL DECONDITIONING: ICD-10-CM

## 2018-05-09 DIAGNOSIS — I10 ESSENTIAL HYPERTENSION: ICD-10-CM

## 2018-05-09 DIAGNOSIS — I50.33 ACUTE ON CHRONIC DIASTOLIC HEART FAILURE (H): Primary | ICD-10-CM

## 2018-05-09 LAB
ANION GAP SERPL CALCULATED.3IONS-SCNC: 9 MMOL/L (ref 3–14)
BUN SERPL-MCNC: 37 MG/DL (ref 7–30)
CALCIUM SERPL-MCNC: 9.2 MG/DL (ref 8.5–10.1)
CHLORIDE SERPL-SCNC: 107 MMOL/L (ref 94–109)
CO2 SERPL-SCNC: 24 MMOL/L (ref 20–32)
CREAT SERPL-MCNC: 1.16 MG/DL (ref 0.52–1.04)
GFR SERPL CREATININE-BSD FRML MDRD: 44 ML/MIN/1.7M2
GLUCOSE SERPL-MCNC: 108 MG/DL (ref 70–99)
INR PPP: 2.81 (ref 0.86–1.14)
POTASSIUM SERPL-SCNC: 3.5 MMOL/L (ref 3.4–5.3)
SODIUM SERPL-SCNC: 140 MMOL/L (ref 133–144)

## 2018-05-09 PROCEDURE — 99316 NF DSCHRG MGMT 30 MIN+: CPT | Performed by: NURSE PRACTITIONER

## 2018-05-09 RX ORDER — WARFARIN SODIUM 5 MG/1
TABLET ORAL
Qty: 30 TABLET
Start: 2018-05-09

## 2019-01-21 ENCOUNTER — RECORDS - HEALTHEAST (OUTPATIENT)
Dept: LAB | Facility: CLINIC | Age: 84
End: 2019-01-21

## 2019-01-22 LAB
ANION GAP SERPL CALCULATED.3IONS-SCNC: 13 MMOL/L (ref 5–18)
BUN SERPL-MCNC: 63 MG/DL (ref 8–28)
CALCIUM SERPL-MCNC: 10.2 MG/DL (ref 8.5–10.5)
CHLORIDE BLD-SCNC: 93 MMOL/L (ref 98–107)
CO2 SERPL-SCNC: 32 MMOL/L (ref 22–31)
CREAT SERPL-MCNC: 1.7 MG/DL (ref 0.6–1.1)
GFR SERPL CREATININE-BSD FRML MDRD: 28 ML/MIN/1.73M2
GLUCOSE BLD-MCNC: 100 MG/DL (ref 70–125)
POTASSIUM BLD-SCNC: 3.9 MMOL/L (ref 3.5–5)
SODIUM SERPL-SCNC: 138 MMOL/L (ref 136–145)

## 2019-01-28 ENCOUNTER — RECORDS - HEALTHEAST (OUTPATIENT)
Dept: LAB | Facility: CLINIC | Age: 84
End: 2019-01-28

## 2019-01-29 LAB
ANION GAP SERPL CALCULATED.3IONS-SCNC: 14 MMOL/L (ref 5–18)
BUN SERPL-MCNC: 97 MG/DL (ref 8–28)
CALCIUM SERPL-MCNC: 10 MG/DL (ref 8.5–10.5)
CHLORIDE BLD-SCNC: 91 MMOL/L (ref 98–107)
CO2 SERPL-SCNC: 28 MMOL/L (ref 22–31)
CREAT SERPL-MCNC: 1.93 MG/DL (ref 0.6–1.1)
ERYTHROCYTE [DISTWIDTH] IN BLOOD BY AUTOMATED COUNT: 15.1 % (ref 11–14.5)
FERRITIN SERPL-MCNC: 216 NG/ML (ref 10–130)
FOLATE SERPL-MCNC: 7.1 NG/ML
GFR SERPL CREATININE-BSD FRML MDRD: 24 ML/MIN/1.73M2
GLUCOSE BLD-MCNC: 157 MG/DL (ref 70–125)
HCT VFR BLD AUTO: 35.1 % (ref 35–47)
HGB BLD-MCNC: 10.8 G/DL (ref 12–16)
IRON SATN MFR SERPL: 12 % (ref 20–50)
IRON SERPL-MCNC: 41 UG/DL (ref 42–175)
MCH RBC QN AUTO: 27.1 PG (ref 27–34)
MCHC RBC AUTO-ENTMCNC: 30.8 G/DL (ref 32–36)
MCV RBC AUTO: 88 FL (ref 80–100)
PLATELET # BLD AUTO: 241 THOU/UL (ref 140–440)
PMV BLD AUTO: 10.8 FL (ref 8.5–12.5)
POTASSIUM BLD-SCNC: 3.7 MMOL/L (ref 3.5–5)
RBC # BLD AUTO: 3.98 MILL/UL (ref 3.8–5.4)
SODIUM SERPL-SCNC: 133 MMOL/L (ref 136–145)
TIBC SERPL-MCNC: 332 UG/DL (ref 313–563)
TRANSFERRIN SERPL-MCNC: 266 MG/DL (ref 212–360)
TSH SERPL DL<=0.005 MIU/L-ACNC: 0.99 UIU/ML (ref 0.3–5)
VIT B12 SERPL-MCNC: 399 PG/ML (ref 213–816)
WBC: 5.5 THOU/UL (ref 4–11)

## 2019-02-05 ENCOUNTER — RECORDS - HEALTHEAST (OUTPATIENT)
Dept: LAB | Facility: CLINIC | Age: 84
End: 2019-02-05

## 2019-02-05 LAB
ANION GAP SERPL CALCULATED.3IONS-SCNC: 11 MMOL/L (ref 5–18)
BUN SERPL-MCNC: 113 MG/DL (ref 8–28)
CALCIUM SERPL-MCNC: 9.6 MG/DL (ref 8.5–10.5)
CHLORIDE BLD-SCNC: 96 MMOL/L (ref 98–107)
CO2 SERPL-SCNC: 27 MMOL/L (ref 22–31)
CREAT SERPL-MCNC: 1.92 MG/DL (ref 0.6–1.1)
GFR SERPL CREATININE-BSD FRML MDRD: 24 ML/MIN/1.73M2
GLUCOSE BLD-MCNC: 82 MG/DL (ref 70–125)
POTASSIUM BLD-SCNC: 3.8 MMOL/L (ref 3.5–5)
SODIUM SERPL-SCNC: 134 MMOL/L (ref 136–145)

## 2019-02-11 ENCOUNTER — RECORDS - HEALTHEAST (OUTPATIENT)
Dept: LAB | Facility: CLINIC | Age: 84
End: 2019-02-11

## 2019-02-12 LAB
ANION GAP SERPL CALCULATED.3IONS-SCNC: 12 MMOL/L (ref 5–18)
BUN SERPL-MCNC: 92 MG/DL (ref 8–28)
CALCIUM SERPL-MCNC: 9.9 MG/DL (ref 8.5–10.5)
CHLORIDE BLD-SCNC: 101 MMOL/L (ref 98–107)
CO2 SERPL-SCNC: 25 MMOL/L (ref 22–31)
CREAT SERPL-MCNC: 1.57 MG/DL (ref 0.6–1.1)
GFR SERPL CREATININE-BSD FRML MDRD: 31 ML/MIN/1.73M2
GLUCOSE BLD-MCNC: 122 MG/DL (ref 70–125)
HGB BLD-MCNC: 11.1 G/DL (ref 12–16)
POTASSIUM BLD-SCNC: 4.2 MMOL/L (ref 3.5–5)
SODIUM SERPL-SCNC: 138 MMOL/L (ref 136–145)

## 2019-04-04 NOTE — LETTER
5/9/2018        RE: Jayne Nino  [address]  [facility] [address]          [facility] DISCHARGE SUMMARY    PATIENT'S NAME: Jayne Nino  YOB: 1927  MEDICAL RECORD NUMBER:  [id number]    PRIMARY CARE PROVIDER AND CLINIC RESPONSIBLE AFTER TRANSFER: Dinora Jimenez [facility] [address] / [facility]     CODE STATUS/ADVANCE DIRECTIVES DISCUSSION:   DNR / DNI        Allergies   Allergen Reactions     Oxycodone      Pt states she takes it for back pain, only causes itching       TRANSFERRING PROVIDERS: YURIY Wild CNP, Milagro Robertson MD  DATE OF SNF ADMISSION:  April / 16 / 2018  DATE OF SNF (anticipated) DISCHARGE: May / 11 / 2018  DISCHARGE DISPOSITION: Non-[facility] Provider   Nursing Facility: [facility] stay 4/11/18 to 4/16/18.     Condition on Discharge:  Improving.  Function:  Ambulating 150-350 feet, Independent with ADLs, managing 5 stairs      DISCHARGE DIAGNOSIS:   1. Acute on chronic diastolic heart failure (H)    2. Moderate aortic stenosis    3. Paroxysmal atrial fibrillation (H)    4. Essential hypertension    5. Physical deconditioning        HPI Nursing Facility Course:  HPI information obtained from: facility chart records, facility staff, patient report and [facility] chart review.  Hospital stay was at  [facility] from 4/11/18 through 4/16/18. 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 She was treated for CHF exacerbation likely related to recently stopping metolazone.  Admitted to this facility for  rehab, medical management and nursing care.     Acute on chronic diastolic heart failure (H)  Moderate aortic stenosis  CHF compensated. She says her dyspnea is improved. No hypoxia. Ongoing edema which is not typical for her.    Patient verbally informed that body search would be performed to  remove any items that may be potentially used for self harm or suicidal behavior, ensure that no potentially dangerous mind or mood altering drugs were being introduced into treatment environment, remove any items that may pose a risk for personal safety due to thought or mood disorder and advised about what would occur during the search process.  Patient denies being in possession of potentially dangerous items.  The patient was provided with an opportunity to ask questions or voice any concerns related to the body surface search prior to it being performed.  The patient agreed to participate in the search process.  Body surface search was conducted by two staff members:  (KATIE Frazier and LIZ Mcgrath) in the privacy of patient's room in a manner consistent with preserving patient's dignity.  Patient response to search process was Cooperative with no adverse physical or psychological response.  Contraband was found and items given to Loss Prevention during the search process.     Cash, loose change, wallet, and cigarettes sent to loss prevention.    Pt had minor scratches on right side of neck, left shoulder, and upper right thigh. Pt had minor bruises on right side of abdomen, left upper arm, left AC, left shin, and right shin. Pt had swollen and slightly reddened area on back of right lower arm. Pt reports falling 3 days ago.   Lymphedema therapy is seeing her, but being very conservative in order to avoid centralizing the fluid into her lungs. They have ordered TG shape stockings. She is worried she will not be able to manage these at home, so she may not wear them.     Wt Readings from Last 4 Encounters:   05/09/18 152 lb (68.9 kg)   05/02/18 152 lb 8 oz (69.2 kg)   05/02/18 152 lb 8 oz (69.2 kg)   04/25/18 152 lb 8 oz (69.2 kg)       Paroxysmal atrial fibrillation (H)  HR 65-89. Current Coumadin dosing 2.5mg po Sun/Tues ; 5mg po all other days. INR 2.81 today.     Essential hypertension  Denies dizziness  BP readings range:  120/71  102/65  142/78  107/66  136/76  144/78  103/63  132/74  133/67  106/68  129/62  116/73    Physical deconditioning  Improving. Variability in ambulation is related to her LUNDBERG, activity tolerance. She wishes to return home with her son. Therapy wanted to do a home assessment and she declined, did not feel this was necessary. They want her to have home care as well, but she doesn't want this, she wants to be able to get out of the house. In discussing the home assessment, she is mostly concerned about being judged on the cleanliness of her home, says it needs to be painted. PT/OT results indicate ability to live independently. She does not have a history of frequent falling. She will likely be safe at home.      PAST MEDICAL HISTORY:  has a past medical history of A-fib (H); Breast cancer (H); Gout; Heart disease; High cholesterol; and Hypothyroid.    DISCHARGE MEDICATIONS:  Current Outpatient Prescriptions   Medication Sig Dispense Refill     acetaminophen (TYLENOL) 325 MG tablet Take 650 mg by mouth every 6 hours as needed  250 tablet      atorvastatin (LIPITOR) 80 MG tablet Take 80 mg by mouth At Bedtime       bumetanide (BUMEX) 1 MG tablet Take 2 tablets (2 mg) by mouth daily as needed For 5 lb weight gain 180 tablet 1     bumetanide (BUMEX) 2 MG tablet Take 1 tablet (2 mg) by mouth daily 60 tablet 0      camphor-menthol (DERMASARRA) 0.5-0.5 % LOTN Apply topically 2 times daily And apply PRN       carvedilol (COREG) 25 MG tablet Take 25 mg by mouth 2 times daily       DIGOXIN PO Take 125 mcg by mouth every 48 hours       famotidine (PEPCID) 20 MG tablet Take 20 mg by mouth daily        ferrous sulfate 140 (45 FE) MG TBCR Take 140 mg by mouth daily       levothyroxine (SYNTHROID, LEVOTHROID) 125 MCG tablet Take 1 tablet by mouth daily. 90 tablet 1     MELATONIN PO Take 3 mg by mouth At Bedtime       Menthol, Topical Analgesic, (ICY HOT EX) Externally apply topically 2 times daily Knees, shoulders, back       metolazone (ZAROXOLYN) 2.5 MG tablet Take 1 tablet (2.5 mg) by mouth daily as needed For 10 lb weight gain 90 tablet 1     Multiple Vitamins-Minerals (MULTIVITAMIN ADULT PO) Take 1 tablet by mouth daily       polyethylene glycol 0.4%- propylene glycol 0.3% (SYSTANE ULTRA) 0.4-0.3 % SOLN ophthalmic solution Place 1 drop into both eyes every hour as needed for dry eyes       VITAMIN D, CHOLECALCIFEROL, PO Take 1,000 Units by mouth daily        warfarin (COUMADIN) 5 MG tablet Take by mouth daily Take as directed per INR       warfarin (COUMADIN) 5 MG tablet 2.5mg by mouth every Tues and Sun.  5mg by mouth all other days 30 tablet        MEDICATION CHANGES/RATIONALE:   Melatonin added for insomnia    Controlled medications sent with patient:   not applicable/none     ROS:    10 point ROS of systems including Constitutional, Eyes, Respiratory, Cardiovascular, Gastroenterology, Genitourinary, Integumentary, Muscularskeletal, Psychiatric were all negative except for pertinent positives noted in my HPI.    Physical Exam:   Vitals: /71  Pulse 74  Temp 97.3  F (36.3  C)  Resp 16  Wt 152 lb (68.9 kg)  SpO2 98%  BMI 25.29 kg/m2  BMI= Body mass index is 25.29 kg/(m^2).  GENERAL APPEARANCE:  Alert, in no distress, oriented  ENT:  Mouth and posterior oropharynx normal, moist mucous membranes, normal hearing  acuity  EYES:  EOM, conjunctivae, lids, pupils and irises normal  NECK:  No adenopathy,masses or thyromegaly  RESP:  respiratory effort and palpation of chest normal, lungs clear to auscultation , no respiratory distress  CV:  Palpation and auscultation of heart done , peripheral edema 2+ in bilateral lower legs, legs firm, hair absent, irregular rhythm (afib), grade 3/6 murmur  ABDOMEN:  normal bowel sounds, soft, nontender, no hepatosplenomegaly or other masses  SKIN:  Inspection of skin and subcutaneous tissue baseline, Palpation of skin and subcutaneous tissue baseline  PSYCH:  oriented X 3, normal insight, judgement and memory, affect and mood normal      DISCHARGE PLAN:  Home with son, outpatient PT/OT per patient request  Patient instructed to follow-up with:  PCP in 7 days      Current [facility] scheduled appointments:  No future appointments.    MTM referral needed and placed by this provider: No    Pending labs: none    SNF labs     CBC RESULTS:   Recent Labs   Lab Test  04/13/18   0617  04/11/18   1752   WBC  4.3  4.0   RBC  3.23*  3.65*   HGB  8.9*  10.2*   HCT  30.0*  33.6*   MCV  93  92   MCH  27.6  27.9   MCHC  29.7*  30.4*   RDW  16.7*  16.3*   PLT  139*  185       Last Basic Metabolic Panel:  Recent Labs   Lab Test  04/18/18   0604  04/15/18   0625   NA  140  140   POTASSIUM  3.7  3.7   CHLORIDE  105  106   NAVARRO  9.1  8.8   CO2  27  26   BUN  62*  57*   CR  1.44*  1.45*   GLC  97  102*       INR 2.81 today 5/9/18  Lab Results   Component Value Date    INR 2.86 04/25/2018    INR 2.67 04/18/2018    INR 2.81 04/16/2018    INR 2.55 04/15/2018    INR 2.24 04/14/2018    INR 2.12 04/13/2018    INR 2.47 04/12/2018    INR 2.92 04/11/2018       Discharge Treatments: none    TOTAL DISCHARGE TIME:   Greater than 30 minutes     Electronically signed by:  YURIY Wild CNP   [facility]  Pager: [phone]

## 2020-11-06 NOTE — PROGRESS NOTES
"Hand Surgery Progress Note    Patient has been afebrile. Says pain and swelling are much improved but still painful in PIP joints of index, ring, and middle fingers. Says she had similar episode at the end of the year which was diagnosed as gout but treated successfully with antibiotics. 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       /71 (BP Location: Right arm)  Pulse 73  Temp 96.3  F (35.7  C) (Oral)  Resp 16  Ht 1.651 m (5' 5\")  Wt 66.3 kg (146 lb 3.2 oz)  SpO2 97%  Breastfeeding? No  BMI 24.33 kg/m2    Right hand with markedly reduced erythema compared to yesterday. Swelling is minimal. Still pain with PROM of PIPs of middle especially, but also, to lesser extent, of index and ring. No wounds, fingers warm and well-perfused. Sensation intact throughout. No pain over flexor sheaths. She does have enlargement of PIP joints diffusely bilaterally c/w reported h/o arthritis.     Prior x-rays reviewed- difficult to interpret in light of flexed digits.     CRP slightly higher today. No leukocytosis.     The patient has been on steroids and abx. In light of this, it is difficult to know what is the source of her clinical improvement. Her history (recent similar episode bilaterally a few months ago) as well as diffuse joint involvement (Multiple MPs, PIPs) is more consistent with gout/pseudogout than infection, although infection cannot be ruled out. I think if this is infectious, it is more likely a cellulitis than a joint infection, and given clinic improvement I do not recommend surgical washout at this time.       " OT orders received and chart was reviewed, and per nursing and CM, pt and family will have a hospice meeting this am.  Asked to defer at this time until decision has been made. Per PT note pt is mobile and had no PT needs. Will continue to follow